# Patient Record
Sex: MALE | Race: WHITE | NOT HISPANIC OR LATINO | Employment: OTHER | URBAN - METROPOLITAN AREA
[De-identification: names, ages, dates, MRNs, and addresses within clinical notes are randomized per-mention and may not be internally consistent; named-entity substitution may affect disease eponyms.]

---

## 2017-09-29 ENCOUNTER — HISTORICAL (OUTPATIENT)
Dept: LAB | Facility: HOSPITAL | Age: 57
End: 2017-09-29

## 2017-09-29 LAB
ABS NEUT (OLG): 2.2 X10(3)/MCL (ref 2.1–9.2)
ALBUMIN SERPL-MCNC: 3.9 GM/DL (ref 3.4–5)
ALBUMIN/GLOB SERPL: 1.5 RATIO (ref 1.1–2)
ALP SERPL-CCNC: 58 UNIT/L (ref 46–116)
ALT SERPL-CCNC: 35 UNIT/L (ref 12–78)
AST SERPL-CCNC: 16 UNIT/L (ref 15–37)
BASOPHILS NFR BLD AUTO: 1 % (ref 0–2)
BILIRUB SERPL-MCNC: 5.7 MG/DL (ref 0.2–1)
BILIRUBIN DIRECT+TOT PNL SERPL-MCNC: 0.36 MG/DL (ref 0–0.2)
BILIRUBIN DIRECT+TOT PNL SERPL-MCNC: 5.34 MG/DL (ref 0–0.8)
BUN SERPL-MCNC: 13.1 MG/DL (ref 7–18)
CALCIUM SERPL-MCNC: 9.3 MG/DL (ref 8.5–10.1)
CHLORIDE SERPL-SCNC: 106 MMOL/L (ref 98–107)
CHOLEST SERPL-MCNC: 143 MG/DL (ref 0–200)
CHOLEST/HDLC SERPL: 2.8 {RATIO} (ref 0–5)
CO2 SERPL-SCNC: 26.6 MMOL/L (ref 21–32)
CREAT SERPL-MCNC: 1.01 MG/DL (ref 0.6–1.3)
EOSINOPHIL NFR BLD AUTO: 1 %
ERYTHROCYTE [DISTWIDTH] IN BLOOD BY AUTOMATED COUNT: 13 % (ref 11.5–17)
EST. AVERAGE GLUCOSE BLD GHB EST-MCNC: 166 MG/DL
FT4I SERPL CALC-MCNC: 6.76
GLOBULIN SER-MCNC: 2.6 GM/DL (ref 2.4–3.5)
GLUCOSE SERPL-MCNC: 163 MG/DL (ref 74–106)
HBA1C MFR BLD: 7.4 % (ref 4.5–6.2)
HCT VFR BLD AUTO: 46.3 % (ref 42–52)
HDLC SERPL-MCNC: 51 MG/DL (ref 40–60)
HGB BLD-MCNC: 15.7 GM/DL (ref 14–18)
LDLC SERPL CALC-MCNC: 72 MG/DL (ref 0–129)
LYMPHOCYTES # BLD AUTO: 1.2 X10(3)/MCL
LYMPHOCYTES NFR BLD AUTO: 32 % (ref 13–40)
MCH RBC QN AUTO: 28.3 PG (ref 27–31)
MCHC RBC AUTO-ENTMCNC: 34 GM/DL (ref 33–36)
MCV RBC AUTO: 83.3 FL (ref 80–94)
MONOCYTES # BLD AUTO: 0.4 X10(3)/MCL
MONOCYTES NFR BLD AUTO: 11 % (ref 2–11)
NEUTROPHILS # BLD AUTO: 2.2 X10(3)/MCL (ref 2.1–9.2)
NEUTROPHILS NFR BLD AUTO: 56 % (ref 47–80)
PLATELET # BLD AUTO: 168 X10(3)/MCL (ref 130–400)
PMV BLD AUTO: 8 FL (ref 7.4–10.4)
POTASSIUM SERPL-SCNC: 3.7 MMOL/L (ref 3.5–5.1)
PROT SERPL-MCNC: 6.5 GM/DL (ref 6.4–8.2)
PSA SERPL-MCNC: 1.1 NG/ML (ref 0–4)
RBC # BLD AUTO: 5.55 X10(6)/MCL (ref 4.7–6.1)
SODIUM SERPL-SCNC: 144 MMOL/L (ref 136–145)
T3RU NFR SERPL: 41 % (ref 31–39)
T4 SERPL-MCNC: 16.5 MCG/DL (ref 4.7–13.3)
TRIGL SERPL-MCNC: 98 MG/DL
TSH SERPL-ACNC: 0.01 MIU/ML (ref 0.36–3.74)
VLDLC SERPL CALC-MCNC: 20 MG/DL
WBC # SPEC AUTO: 3.9 X10(3)/MCL (ref 4.5–11.5)

## 2017-10-16 ENCOUNTER — HISTORICAL (OUTPATIENT)
Dept: LAB | Facility: HOSPITAL | Age: 57
End: 2017-10-16

## 2017-10-16 LAB
FT4I SERPL CALC-MCNC: 5.85
T3RU NFR SERPL: 39 % (ref 31–39)
T4 SERPL-MCNC: 15 MCG/DL (ref 4.7–13.3)
TSH SERPL-ACNC: 0.01 MIU/ML (ref 0.36–3.74)

## 2017-10-24 ENCOUNTER — HISTORICAL (OUTPATIENT)
Dept: RADIOLOGY | Facility: HOSPITAL | Age: 57
End: 2017-10-24

## 2018-04-23 ENCOUNTER — HISTORICAL (OUTPATIENT)
Dept: RESPIRATORY THERAPY | Facility: HOSPITAL | Age: 58
End: 2018-04-23

## 2018-04-23 LAB
ALBUMIN SERPL-MCNC: 3.9 GM/DL (ref 3.4–5)
ALP SERPL-CCNC: 66 UNIT/L (ref 50–136)
ALT SERPL-CCNC: 19 UNIT/L (ref 12–78)
AST SERPL-CCNC: 13 UNIT/L (ref 15–37)
BILIRUB SERPL-MCNC: 2.1 MG/DL (ref 0.2–1)
BILIRUBIN DIRECT+TOT PNL SERPL-MCNC: 0.4 MG/DL (ref 0–0.5)
BILIRUBIN DIRECT+TOT PNL SERPL-MCNC: 1.7 MG/DL (ref 0–0.8)
LIVER PROFILE INTERP: ABNORMAL
PROT SERPL-MCNC: 7 GM/DL (ref 6.4–8.2)
TSH SERPL-ACNC: 2.07 MIU/L (ref 0.36–3.74)

## 2018-09-21 ENCOUNTER — HISTORICAL (OUTPATIENT)
Dept: LAB | Facility: HOSPITAL | Age: 58
End: 2018-09-21

## 2018-09-21 LAB
BUN SERPL-MCNC: 8 MG/DL (ref 7–18)
CALCIUM SERPL-MCNC: 8.2 MG/DL (ref 8.5–10.1)
CHLORIDE SERPL-SCNC: 101 MMOL/L (ref 98–107)
CO2 SERPL-SCNC: 31.2 MMOL/L (ref 21–32)
CREAT SERPL-MCNC: 0.94 MG/DL (ref 0.6–1.3)
CREAT/UREA NIT SERPL: 9
EST. AVERAGE GLUCOSE BLD GHB EST-MCNC: 157 MG/DL
GLUCOSE SERPL-MCNC: 325 MG/DL (ref 74–106)
HBA1C MFR BLD: 7.1 % (ref 4.5–6.2)
POTASSIUM SERPL-SCNC: 3.7 MMOL/L (ref 3.5–5.1)
SODIUM SERPL-SCNC: 140 MMOL/L (ref 136–145)

## 2019-03-18 ENCOUNTER — HISTORICAL (OUTPATIENT)
Dept: ENDOSCOPY | Facility: HOSPITAL | Age: 59
End: 2019-03-18

## 2019-07-01 ENCOUNTER — HISTORICAL (OUTPATIENT)
Dept: ENDOSCOPY | Facility: HOSPITAL | Age: 59
End: 2019-07-01

## 2019-07-16 ENCOUNTER — HISTORICAL (OUTPATIENT)
Dept: LAB | Facility: HOSPITAL | Age: 59
End: 2019-07-16

## 2019-07-16 LAB
ABS NEUT (OLG): 1.89 X10(3)/MCL (ref 2.1–9.2)
ALBUMIN SERPL-MCNC: 3.2 GM/DL (ref 3.4–5)
ALBUMIN/GLOB SERPL: 1 RATIO (ref 1.1–2)
ALP SERPL-CCNC: 68 UNIT/L (ref 46–116)
ALT SERPL-CCNC: 16 UNIT/L (ref 12–78)
AST SERPL-CCNC: 4 UNIT/L (ref 15–37)
BASOPHILS # BLD AUTO: 0 X10(3)/MCL (ref 0–0.2)
BASOPHILS NFR BLD AUTO: 1 %
BILIRUB SERPL-MCNC: 0.8 MG/DL (ref 0.2–1)
BILIRUBIN DIRECT+TOT PNL SERPL-MCNC: 0.18 MG/DL (ref 0–0.2)
BILIRUBIN DIRECT+TOT PNL SERPL-MCNC: 0.62 MG/DL (ref 0–0.8)
BUN SERPL-MCNC: 15.3 MG/DL (ref 7–18)
CALCIUM SERPL-MCNC: 8.5 MG/DL (ref 8.5–10.1)
CHLORIDE SERPL-SCNC: 100 MMOL/L (ref 98–107)
CHOLEST SERPL-MCNC: 180 MG/DL (ref 0–200)
CHOLEST/HDLC SERPL: 4.3 {RATIO} (ref 0–5)
CO2 SERPL-SCNC: 30.5 MMOL/L (ref 21–32)
CREAT SERPL-MCNC: 1.05 MG/DL (ref 0.6–1.3)
EOSINOPHIL # BLD AUTO: 0.1 X10(3)/MCL (ref 0–0.9)
EOSINOPHIL NFR BLD AUTO: 3 %
ERYTHROCYTE [DISTWIDTH] IN BLOOD BY AUTOMATED COUNT: 12.5 % (ref 11.5–17)
EST. AVERAGE GLUCOSE BLD GHB EST-MCNC: 237 MG/DL
FT4I SERPL CALC-MCNC: 3.46
GLOBULIN SER-MCNC: 3.3 GM/DL (ref 2.4–3.5)
GLUCOSE SERPL-MCNC: 349 MG/DL (ref 74–106)
HBA1C MFR BLD: 9.9 % (ref 4.5–6.2)
HCT VFR BLD AUTO: 40.8 % (ref 42–52)
HDLC SERPL-MCNC: 42 MG/DL (ref 40–60)
HGB BLD-MCNC: 14.5 GM/DL (ref 14–18)
IMM GRANULOCYTES # BLD AUTO: 0.03 % (ref 0–0.02)
IMM GRANULOCYTES NFR BLD AUTO: 0.8 % (ref 0–0.43)
LDLC SERPL CALC-MCNC: 111 MG/DL (ref 0–129)
LYMPHOCYTES # BLD AUTO: 1.3 X10(3)/MCL (ref 0.6–4.6)
LYMPHOCYTES NFR BLD AUTO: 36 %
MCH RBC QN AUTO: 29.4 PG (ref 27–31)
MCHC RBC AUTO-ENTMCNC: 35.5 GM/DL (ref 33–36)
MCV RBC AUTO: 82.8 FL (ref 80–94)
MONOCYTES # BLD AUTO: 0.3 X10(3)/MCL (ref 0.1–1.3)
MONOCYTES NFR BLD AUTO: 9 %
NEUTROPHILS # BLD AUTO: 1.89 X10(3)/MCL (ref 1.4–7.9)
NEUTROPHILS NFR BLD AUTO: 50 %
PLATELET # BLD AUTO: 253 X10(3)/MCL (ref 130–400)
PMV BLD AUTO: 9 FL (ref 9.4–12.4)
POTASSIUM SERPL-SCNC: 4.5 MMOL/L (ref 3.5–5.1)
PROT SERPL-MCNC: 6.5 GM/DL (ref 6.4–8.2)
PSA SERPL-MCNC: 1.76 NG/ML (ref 0–4)
RBC # BLD AUTO: 4.93 X10(6)/MCL (ref 4.7–6.1)
SODIUM SERPL-SCNC: 138 MMOL/L (ref 136–145)
T3RU NFR SERPL: 36 % (ref 31–39)
T4 SERPL-MCNC: 9.6 MCG/DL (ref 4.7–13.3)
TRIGL SERPL-MCNC: 134 MG/DL
TSH SERPL-ACNC: 2.58 MIU/ML (ref 0.36–3.74)
VLDLC SERPL CALC-MCNC: 27 MG/DL
WBC # SPEC AUTO: 3.8 X10(3)/MCL (ref 4.5–11.5)

## 2019-08-05 ENCOUNTER — HISTORICAL (OUTPATIENT)
Dept: ENDOSCOPY | Facility: HOSPITAL | Age: 59
End: 2019-08-05

## 2019-09-09 ENCOUNTER — HISTORICAL (OUTPATIENT)
Dept: ENDOSCOPY | Facility: HOSPITAL | Age: 59
End: 2019-09-09

## 2020-01-07 ENCOUNTER — HISTORICAL (OUTPATIENT)
Dept: LAB | Facility: HOSPITAL | Age: 60
End: 2020-01-07

## 2020-01-07 LAB
BUN SERPL-MCNC: 12 MG/DL (ref 7–18)
CALCIUM SERPL-MCNC: 8.8 MG/DL (ref 8.5–10.1)
CHLORIDE SERPL-SCNC: 104 MMOL/L (ref 98–107)
CO2 SERPL-SCNC: 30.2 MMOL/L (ref 21–32)
CREAT SERPL-MCNC: 0.88 MG/DL (ref 0.6–1.3)
CREAT/UREA NIT SERPL: 14
EST. AVERAGE GLUCOSE BLD GHB EST-MCNC: 240 MG/DL
GLUCOSE SERPL-MCNC: 101 MG/DL (ref 74–106)
HBA1C MFR BLD: 10 % (ref 4.5–6.2)
POTASSIUM SERPL-SCNC: 3.6 MMOL/L (ref 3.5–5.1)
SODIUM SERPL-SCNC: 143 MMOL/L (ref 136–145)

## 2020-06-23 ENCOUNTER — HISTORICAL (OUTPATIENT)
Dept: ADMINISTRATIVE | Facility: HOSPITAL | Age: 60
End: 2020-06-23

## 2020-06-23 LAB
ABS NEUT (OLG): 7.15 X10(3)/MCL (ref 2.1–9.2)
BASOPHILS # BLD AUTO: 0 X10(3)/MCL (ref 0–0.2)
BASOPHILS NFR BLD AUTO: 0 %
BUN SERPL-MCNC: 7.5 MG/DL (ref 8.4–25.7)
BUN SERPL-MCNC: 8.4 MG/DL (ref 8.4–25.7)
CALCIUM SERPL-MCNC: 7.6 MG/DL (ref 8.8–10)
CALCIUM SERPL-MCNC: 8.6 MG/DL (ref 8.8–10)
CHLORIDE SERPL-SCNC: 105 MMOL/L (ref 98–107)
CHLORIDE SERPL-SCNC: 105 MMOL/L (ref 98–107)
CO2 SERPL-SCNC: 21 MMOL/L (ref 23–31)
CO2 SERPL-SCNC: 27 MMOL/L (ref 23–31)
CREAT SERPL-MCNC: 0.78 MG/DL (ref 0.73–1.18)
CREAT SERPL-MCNC: 0.82 MG/DL (ref 0.73–1.18)
CREAT/UREA NIT SERPL: 11
CREAT/UREA NIT SERPL: 9
EOSINOPHIL # BLD AUTO: 0 X10(3)/MCL (ref 0–0.9)
EOSINOPHIL NFR BLD AUTO: 0 %
ERYTHROCYTE [DISTWIDTH] IN BLOOD BY AUTOMATED COUNT: 12.9 % (ref 11.5–17)
GLUCOSE SERPL-MCNC: 219 MG/DL (ref 82–115)
GLUCOSE SERPL-MCNC: 239 MG/DL (ref 82–115)
HCT VFR BLD AUTO: 40.5 % (ref 42–52)
HGB BLD-MCNC: 13.8 GM/DL (ref 14–18)
LYMPHOCYTES # BLD AUTO: 0.9 X10(3)/MCL (ref 0.6–4.6)
LYMPHOCYTES NFR BLD AUTO: 11 %
MCH RBC QN AUTO: 29.2 PG (ref 27–31)
MCHC RBC AUTO-ENTMCNC: 34.1 GM/DL (ref 33–36)
MCV RBC AUTO: 85.8 FL (ref 80–94)
MONOCYTES # BLD AUTO: 0.5 X10(3)/MCL (ref 0.1–1.3)
MONOCYTES NFR BLD AUTO: 6 %
NEUTROPHILS # BLD AUTO: 7.15 X10(3)/MCL (ref 2.1–9.2)
NEUTROPHILS NFR BLD AUTO: 83 %
PLATELET # BLD AUTO: 153 X10(3)/MCL (ref 130–400)
PMV BLD AUTO: 9.8 FL (ref 9.4–12.4)
POTASSIUM SERPL-SCNC: 3.7 MMOL/L (ref 3.5–5.1)
POTASSIUM SERPL-SCNC: 4 MMOL/L (ref 3.5–5.1)
RBC # BLD AUTO: 4.72 X10(6)/MCL (ref 4.7–6.1)
SODIUM SERPL-SCNC: 139 MMOL/L (ref 136–145)
SODIUM SERPL-SCNC: 141 MMOL/L (ref 136–145)
WBC # SPEC AUTO: 8.7 X10(3)/MCL (ref 4.5–11.5)

## 2020-06-24 LAB
ABS NEUT (OLG): 5.88 X10(3)/MCL (ref 2.1–9.2)
ALBUMIN SERPL-MCNC: 3.1 GM/DL (ref 3.4–4.8)
ALBUMIN/GLOB SERPL: 1.5 RATIO (ref 1.1–2)
ALP SERPL-CCNC: 44 UNIT/L (ref 40–150)
ALT SERPL-CCNC: 7 UNIT/L (ref 0–55)
AST SERPL-CCNC: 26 UNIT/L (ref 5–34)
BASOPHILS # BLD AUTO: 0 X10(3)/MCL (ref 0–0.2)
BASOPHILS NFR BLD AUTO: 0 %
BILIRUB SERPL-MCNC: 3.8 MG/DL
BILIRUBIN DIRECT+TOT PNL SERPL-MCNC: 0.6 MG/DL (ref 0–0.5)
BILIRUBIN DIRECT+TOT PNL SERPL-MCNC: 3.2 MG/DL (ref 0–0.8)
BUN SERPL-MCNC: 10.6 MG/DL (ref 8.4–25.7)
CALCIUM SERPL-MCNC: 7.2 MG/DL (ref 8.8–10)
CHLORIDE SERPL-SCNC: 106 MMOL/L (ref 98–107)
CO2 SERPL-SCNC: 22 MMOL/L (ref 23–31)
CREAT SERPL-MCNC: 0.82 MG/DL (ref 0.73–1.18)
ERYTHROCYTE [DISTWIDTH] IN BLOOD BY AUTOMATED COUNT: 13.2 % (ref 11.5–17)
GLOBULIN SER-MCNC: 2.1 GM/DL (ref 2.4–3.5)
GLUCOSE SERPL-MCNC: 257 MG/DL (ref 82–115)
HCT VFR BLD AUTO: 38.1 % (ref 42–52)
HGB BLD-MCNC: 13.1 GM/DL (ref 14–18)
LYMPHOCYTES # BLD AUTO: 0.8 X10(3)/MCL (ref 0.6–4.6)
LYMPHOCYTES NFR BLD AUTO: 11 %
MCH RBC QN AUTO: 29.7 PG (ref 27–31)
MCHC RBC AUTO-ENTMCNC: 34.4 GM/DL (ref 33–36)
MCV RBC AUTO: 86.4 FL (ref 80–94)
MONOCYTES # BLD AUTO: 0.4 X10(3)/MCL (ref 0.1–1.3)
MONOCYTES NFR BLD AUTO: 6 %
NEUTROPHILS # BLD AUTO: 5.88 X10(3)/MCL (ref 2.1–9.2)
NEUTROPHILS NFR BLD AUTO: 83 %
PLATELET # BLD AUTO: 152 X10(3)/MCL (ref 130–400)
PMV BLD AUTO: 10.1 FL (ref 9.4–12.4)
POTASSIUM SERPL-SCNC: 3.8 MMOL/L (ref 3.5–5.1)
PROT SERPL-MCNC: 5.2 GM/DL (ref 5.8–7.6)
RBC # BLD AUTO: 4.41 X10(6)/MCL (ref 4.7–6.1)
SODIUM SERPL-SCNC: 137 MMOL/L (ref 136–145)
WBC # SPEC AUTO: 7.1 X10(3)/MCL (ref 4.5–11.5)

## 2020-08-10 ENCOUNTER — HISTORICAL (OUTPATIENT)
Dept: LAB | Facility: HOSPITAL | Age: 60
End: 2020-08-10

## 2020-08-10 LAB
ALBUMIN SERPL-MCNC: 3.7 GM/DL (ref 3.4–5)
ALP SERPL-CCNC: 55 UNIT/L (ref 46–116)
ALT SERPL-CCNC: 13 UNIT/L (ref 12–78)
AST SERPL-CCNC: 11 UNIT/L (ref 15–37)
BILIRUB SERPL-MCNC: 2.3 MG/DL (ref 0.2–1)
BILIRUBIN DIRECT+TOT PNL SERPL-MCNC: 0.41 MG/DL (ref 0–0.2)
BILIRUBIN DIRECT+TOT PNL SERPL-MCNC: 1.89 MG/DL (ref 0–0.8)
BUN SERPL-MCNC: 16.1 MG/DL (ref 7–18)
CALCIUM SERPL-MCNC: 9 MG/DL (ref 8.5–10.1)
CHLORIDE SERPL-SCNC: 105 MMOL/L (ref 98–107)
CHOLEST SERPL-MCNC: 213 MG/DL (ref 0–200)
CHOLEST/HDLC SERPL: 3 {RATIO} (ref 0–5)
CO2 SERPL-SCNC: 28.4 MMOL/L (ref 21–32)
CREAT SERPL-MCNC: 0.73 MG/DL (ref 0.6–1.3)
CREAT/UREA NIT SERPL: 22 MG/DL (ref 12–14)
ERYTHROCYTE [DISTWIDTH] IN BLOOD BY AUTOMATED COUNT: 12.5 % (ref 11.5–17)
EST. AVERAGE GLUCOSE BLD GHB EST-MCNC: 200 MG/DL
FT4I SERPL CALC-MCNC: 2.94
GLUCOSE SERPL-MCNC: 159 MG/DL (ref 74–106)
HBA1C MFR BLD: 8.6 % (ref 4.5–6.2)
HCT VFR BLD AUTO: 42.5 % (ref 42–52)
HDLC SERPL-MCNC: 70 MG/DL (ref 40–60)
HGB BLD-MCNC: 14.9 GM/DL (ref 14–18)
LDLC SERPL CALC-MCNC: 129 MG/DL (ref 0–129)
MCH RBC QN AUTO: 29.2 PG (ref 27–31)
MCHC RBC AUTO-ENTMCNC: 35.1 GM/DL (ref 33–36)
MCV RBC AUTO: 83.2 FL (ref 80–94)
PLATELET # BLD AUTO: 164 X10(3)/MCL (ref 130–400)
PMV BLD AUTO: 9.3 FL (ref 9.4–12.4)
POTASSIUM SERPL-SCNC: 3.9 MMOL/L (ref 3.5–5.1)
PROT SERPL-MCNC: 6.6 GM/DL (ref 6.4–8.2)
PSA SERPL-MCNC: 3.36 NG/ML (ref 0–4)
RBC # BLD AUTO: 5.11 X10(6)/MCL (ref 4.7–6.1)
SODIUM SERPL-SCNC: 143 MMOL/L (ref 136–145)
T3RU NFR SERPL: 31 % (ref 31–39)
T4 SERPL-MCNC: 9.5 MCG/DL (ref 4.7–13.3)
TRIGL SERPL-MCNC: 69 MG/DL
TSH SERPL-ACNC: 1.96 MIU/ML (ref 0.36–3.74)
VLDLC SERPL CALC-MCNC: 14 MG/DL
WBC # SPEC AUTO: 3.5 X10(3)/MCL (ref 4.5–11.5)

## 2020-09-11 ENCOUNTER — HISTORICAL (OUTPATIENT)
Dept: ENDOSCOPY | Facility: HOSPITAL | Age: 60
End: 2020-09-11

## 2020-10-12 ENCOUNTER — HISTORICAL (OUTPATIENT)
Dept: LAB | Facility: HOSPITAL | Age: 60
End: 2020-10-12

## 2020-10-12 LAB
ALBUMIN SERPL-MCNC: 3.9 GM/DL (ref 3.4–5)
ALBUMIN/GLOB SERPL: 1.3 RATIO (ref 1.1–2)
ALP SERPL-CCNC: 66 UNIT/L (ref 46–116)
ALT SERPL-CCNC: 14 UNIT/L (ref 12–78)
AST SERPL-CCNC: 13 UNIT/L (ref 15–37)
BILIRUB SERPL-MCNC: 3 MG/DL (ref 0.2–1)
BILIRUBIN DIRECT+TOT PNL SERPL-MCNC: 0.46 MG/DL (ref 0–0.2)
BILIRUBIN DIRECT+TOT PNL SERPL-MCNC: 2.54 MG/DL (ref 0–0.8)
BUN SERPL-MCNC: 7.1 MG/DL (ref 7–18)
CALCIUM SERPL-MCNC: 8.7 MG/DL (ref 8.5–10.1)
CHLORIDE SERPL-SCNC: 104 MMOL/L (ref 98–107)
CO2 SERPL-SCNC: 27.4 MMOL/L (ref 21–32)
CREAT SERPL-MCNC: 0.91 MG/DL (ref 0.6–1.3)
ERYTHROCYTE [DISTWIDTH] IN BLOOD BY AUTOMATED COUNT: 12.7 % (ref 11.5–17)
GGT SERPL-CCNC: 10 U/L (ref 12–64)
GLOBULIN SER-MCNC: 2.9 GM/DL (ref 2.4–3.5)
GLUCOSE SERPL-MCNC: 243 MG/DL (ref 74–106)
HCT VFR BLD AUTO: 45.6 % (ref 42–52)
HGB BLD-MCNC: 15.7 GM/DL (ref 14–18)
LDH SERPL-CCNC: 164 UNIT/L (ref 81–234)
MCH RBC QN AUTO: 29.2 PG (ref 27–31)
MCHC RBC AUTO-ENTMCNC: 34.4 GM/DL (ref 33–36)
MCV RBC AUTO: 84.9 FL (ref 80–94)
PLATELET # BLD AUTO: 182 X10(3)/MCL (ref 130–400)
PMV BLD AUTO: 9.5 FL (ref 9.4–12.4)
POTASSIUM SERPL-SCNC: 3.6 MMOL/L (ref 3.5–5.1)
PROT SERPL-MCNC: 6.8 GM/DL (ref 6.4–8.2)
RBC # BLD AUTO: 5.37 X10(6)/MCL (ref 4.7–6.1)
SODIUM SERPL-SCNC: 143 MMOL/L (ref 136–145)
WBC # SPEC AUTO: 3.2 X10(3)/MCL (ref 4.5–11.5)

## 2021-02-01 ENCOUNTER — HISTORICAL (OUTPATIENT)
Dept: LAB | Facility: HOSPITAL | Age: 61
End: 2021-02-01

## 2021-02-01 LAB
ALBUMIN SERPL-MCNC: 3.7 GM/DL (ref 3.4–4.8)
ALP SERPL-CCNC: 54 UNIT/L (ref 40–150)
ALT SERPL-CCNC: 6 UNIT/L (ref 0–55)
AST SERPL-CCNC: 11 UNIT/L (ref 5–34)
BILIRUB SERPL-MCNC: 3 MG/DL
BILIRUBIN DIRECT+TOT PNL SERPL-MCNC: 0.7 MG/DL (ref 0–0.5)
BILIRUBIN DIRECT+TOT PNL SERPL-MCNC: 2.3 MG/DL (ref 0–0.8)
BUN SERPL-MCNC: 9.8 MG/DL (ref 8.4–25.7)
CALCIUM SERPL-MCNC: 8.5 MG/DL (ref 8.8–10)
CHLORIDE SERPL-SCNC: 102 MMOL/L (ref 98–107)
CO2 SERPL-SCNC: 28 MMOL/L (ref 23–31)
CREAT SERPL-MCNC: 0.84 MG/DL (ref 0.73–1.18)
CREAT/UREA NIT SERPL: 12
EST. AVERAGE GLUCOSE BLD GHB EST-MCNC: 246 MG/DL
GLUCOSE SERPL-MCNC: 249 MG/DL (ref 82–115)
HBA1C MFR BLD: 10.2 %
POTASSIUM SERPL-SCNC: 3.5 MMOL/L (ref 3.5–5.1)
PROT SERPL-MCNC: 6.5 GM/DL (ref 5.8–7.6)
PSA SERPL-MCNC: 1.18 NG/ML
SODIUM SERPL-SCNC: 138 MMOL/L (ref 136–145)

## 2021-03-10 ENCOUNTER — HISTORICAL (OUTPATIENT)
Dept: ENDOSCOPY | Facility: HOSPITAL | Age: 61
End: 2021-03-10

## 2021-04-15 ENCOUNTER — HISTORICAL (OUTPATIENT)
Dept: LAB | Facility: HOSPITAL | Age: 61
End: 2021-04-15

## 2021-04-15 LAB
BUN SERPL-MCNC: 11.6 MG/DL (ref 8.4–25.7)
CALCIUM SERPL-MCNC: 8.6 MG/DL (ref 8.8–10)
CHLORIDE SERPL-SCNC: 103 MMOL/L (ref 98–107)
CO2 SERPL-SCNC: 31 MMOL/L (ref 23–31)
CREAT SERPL-MCNC: 0.86 MG/DL (ref 0.73–1.18)
CREAT/UREA NIT SERPL: 13
ERYTHROCYTE [DISTWIDTH] IN BLOOD BY AUTOMATED COUNT: 13.3 % (ref 11.5–17)
GLUCOSE SERPL-MCNC: 72 MG/DL (ref 82–115)
HCT VFR BLD AUTO: 45.9 % (ref 42–52)
HGB BLD-MCNC: 15.2 GM/DL (ref 14–18)
INR PPP: 1 (ref 2–3)
MAGNESIUM SERPL-MCNC: 1.5 MG/DL (ref 1.6–2.6)
MCH RBC QN AUTO: 28.6 PG (ref 27–31)
MCHC RBC AUTO-ENTMCNC: 33.1 GM/DL (ref 33–36)
MCV RBC AUTO: 86.3 FL (ref 80–94)
PLATELET # BLD AUTO: 178 X10(3)/MCL (ref 130–400)
PMV BLD AUTO: 9.5 FL (ref 9.4–12.4)
POTASSIUM SERPL-SCNC: 3.4 MMOL/L (ref 3.5–5.1)
PROTHROMBIN TIME: 10.5 SECOND(S) (ref 9.3–11.4)
RBC # BLD AUTO: 5.32 X10(6)/MCL (ref 4.7–6.1)
SODIUM SERPL-SCNC: 144 MMOL/L (ref 136–145)
WBC # SPEC AUTO: 4.3 X10(3)/MCL (ref 4.5–11.5)

## 2021-04-19 ENCOUNTER — HISTORICAL (OUTPATIENT)
Dept: ADMINISTRATIVE | Facility: HOSPITAL | Age: 61
End: 2021-04-19

## 2021-04-19 LAB
ALBUMIN SERPL-MCNC: 3.2 GM/DL (ref 3.4–4.8)
ALBUMIN/GLOB SERPL: 1.4 RATIO (ref 1.1–2)
ALP SERPL-CCNC: 61 UNIT/L (ref 40–150)
ALT SERPL-CCNC: 34 UNIT/L (ref 0–55)
AST SERPL-CCNC: 21 UNIT/L (ref 5–34)
BILIRUB SERPL-MCNC: 2.9 MG/DL
BILIRUBIN DIRECT+TOT PNL SERPL-MCNC: 0.8 MG/DL (ref 0–0.5)
BILIRUBIN DIRECT+TOT PNL SERPL-MCNC: 2.1 MG/DL (ref 0–0.8)
BUN SERPL-MCNC: 10.1 MG/DL (ref 8.4–25.7)
CALCIUM SERPL-MCNC: 8.4 MG/DL (ref 8.8–10)
CHLORIDE SERPL-SCNC: 106 MMOL/L (ref 98–107)
CO2 SERPL-SCNC: 27 MMOL/L (ref 23–31)
CREAT SERPL-MCNC: 0.86 MG/DL (ref 0.73–1.18)
GLOBULIN SER-MCNC: 2.3 GM/DL (ref 2.4–3.5)
GLUCOSE SERPL-MCNC: 165 MG/DL (ref 82–115)
MAGNESIUM SERPL-MCNC: 1.8 MG/DL (ref 1.6–2.6)
POTASSIUM SERPL-SCNC: 3.4 MMOL/L (ref 3.5–5.1)
PROT SERPL-MCNC: 5.5 GM/DL (ref 5.8–7.6)
SODIUM SERPL-SCNC: 140 MMOL/L (ref 136–145)

## 2021-05-12 ENCOUNTER — HISTORICAL (OUTPATIENT)
Dept: ENDOSCOPY | Facility: HOSPITAL | Age: 61
End: 2021-05-12

## 2021-09-07 ENCOUNTER — HISTORICAL (OUTPATIENT)
Dept: LAB | Facility: HOSPITAL | Age: 61
End: 2021-09-07

## 2021-09-07 LAB
ALBUMIN SERPL-MCNC: 3.5 GM/DL (ref 3.4–4.8)
ALP SERPL-CCNC: 51 UNIT/L (ref 40–150)
ALT SERPL-CCNC: 24 UNIT/L (ref 0–55)
APPEARANCE, UA: CLEAR
AST SERPL-CCNC: 30 UNIT/L (ref 5–34)
BACTERIA #/AREA URNS AUTO: ABNORMAL /HPF
BILIRUB SERPL-MCNC: 5.8 MG/DL
BILIRUB UR QL STRIP: ABNORMAL
BILIRUBIN DIRECT+TOT PNL SERPL-MCNC: 1 MG/DL (ref 0–0.5)
BILIRUBIN DIRECT+TOT PNL SERPL-MCNC: 4.8 MG/DL (ref 0–0.8)
BUN SERPL-MCNC: 11 MG/DL (ref 8.4–25.7)
CALCIUM SERPL-MCNC: 8.6 MG/DL (ref 8.8–10)
CHLORIDE SERPL-SCNC: 105 MMOL/L (ref 98–107)
CHOLEST SERPL-MCNC: 173 MG/DL
CHOLEST/HDLC SERPL: 3 {RATIO} (ref 0–5)
CO2 SERPL-SCNC: 27 MMOL/L (ref 23–31)
COLOR UR: YELLOW
CREAT SERPL-MCNC: 0.92 MG/DL (ref 0.73–1.18)
CREAT UR-MCNC: 237.7 MG/DL (ref 58–161)
CREAT/UREA NIT SERPL: 12
ERYTHROCYTE [DISTWIDTH] IN BLOOD BY AUTOMATED COUNT: 14 % (ref 11.5–17)
EST. AVERAGE GLUCOSE BLD GHB EST-MCNC: 205.9 MG/DL
FT4I SERPL CALC-MCNC: 3.08 (ref 2.6–3.6)
GLUCOSE (UA): 100
GLUCOSE SERPL-MCNC: 139 MG/DL (ref 82–115)
HBA1C MFR BLD: 8.8 %
HCT VFR BLD AUTO: 47.4 % (ref 42–52)
HDLC SERPL-MCNC: 57 MG/DL (ref 35–60)
HGB BLD-MCNC: 16.2 GM/DL (ref 14–18)
HGB UR QL STRIP: ABNORMAL
KETONES UR QL STRIP: ABNORMAL
LDH SERPL-CCNC: 320 UNIT/L (ref 140–271)
LDLC SERPL CALC-MCNC: 94 MG/DL (ref 50–140)
LEUKOCYTE ESTERASE UR QL STRIP: NEGATIVE
MCH RBC QN AUTO: 29.1 PG (ref 27–31)
MCHC RBC AUTO-ENTMCNC: 34.2 GM/DL (ref 33–36)
MCV RBC AUTO: 85.3 FL (ref 80–94)
MICROALBUMIN UR-MCNC: 448.9 UG/ML
MICROALBUMIN/CREAT RATIO PNL UR: 188.9 MG/GM CR (ref 0–30)
MUCOUS THREADS URNS QL MICRO: SLIGHT
NITRITE UR QL STRIP.AUTO: NEGATIVE
PH UR STRIP: 6 [PH] (ref 5–9)
PLATELET # BLD AUTO: 132 X10(3)/MCL (ref 130–400)
PMV BLD AUTO: 10.9 FL (ref 9.4–12.4)
POTASSIUM SERPL-SCNC: 3.8 MMOL/L (ref 3.5–5.1)
PROT SERPL-MCNC: 6 GM/DL (ref 5.8–7.6)
PROT UR QL STRIP: 100
RBC # BLD AUTO: 5.56 X10(6)/MCL (ref 4.7–6.1)
RBC #/AREA URNS HPF: ABNORMAL /HPF
SODIUM SERPL-SCNC: 143 MMOL/L (ref 136–145)
SP GR UR STRIP: 1.02 (ref 1–1.03)
SQUAMOUS EPITHELIAL, UA: ABNORMAL
T3RU NFR SERPL: 35.8 % (ref 31–39)
T4 SERPL-MCNC: 8.59 UG/DL (ref 4.87–11.72)
TRIGL SERPL-MCNC: 110 MG/DL (ref 34–140)
TSH SERPL-ACNC: 3.1 UIU/ML (ref 0.35–4.94)
UROBILINOGEN UR STRIP-ACNC: >=8
VLDLC SERPL CALC-MCNC: 22 MG/DL
WBC # SPEC AUTO: 4.2 X10(3)/MCL (ref 4.5–11.5)
WBC #/AREA URNS AUTO: ABNORMAL /[HPF]

## 2021-11-09 ENCOUNTER — HISTORICAL (OUTPATIENT)
Dept: LAB | Facility: HOSPITAL | Age: 61
End: 2021-11-09

## 2021-11-09 LAB
ABS NEUT (OLG): 1.82 X10(3)/MCL (ref 2.1–9.2)
BASOPHILS # BLD AUTO: 0 X10(3)/MCL (ref 0–0.2)
BASOPHILS NFR BLD AUTO: 1 %
BUN SERPL-MCNC: 8.8 MG/DL (ref 8.4–25.7)
CALCIUM SERPL-MCNC: 8.8 MG/DL (ref 8.7–10.5)
CHLORIDE SERPL-SCNC: 101 MMOL/L (ref 98–107)
CO2 SERPL-SCNC: 30 MMOL/L (ref 23–31)
CREAT SERPL-MCNC: 0.94 MG/DL (ref 0.73–1.18)
CREAT/UREA NIT SERPL: 9
EOSINOPHIL # BLD AUTO: 0.1 X10(3)/MCL (ref 0–0.9)
EOSINOPHIL NFR BLD AUTO: 2 %
ERYTHROCYTE [DISTWIDTH] IN BLOOD BY AUTOMATED COUNT: 14 % (ref 11.5–17)
GLUCOSE SERPL-MCNC: 124 MG/DL (ref 82–115)
HCT VFR BLD AUTO: 44.5 % (ref 42–52)
HGB BLD-MCNC: 14.7 GM/DL (ref 14–18)
IMM GRANULOCYTES # BLD AUTO: 0.01 % (ref 0–0.02)
IMM GRANULOCYTES NFR BLD AUTO: 0.3 % (ref 0–0.43)
INR PPP: 1.1 (ref 2–3)
LYMPHOCYTES # BLD AUTO: 1.2 X10(3)/MCL (ref 0.6–4.6)
LYMPHOCYTES NFR BLD AUTO: 34 %
MCH RBC QN AUTO: 29.4 PG (ref 27–31)
MCHC RBC AUTO-ENTMCNC: 33 GM/DL (ref 33–36)
MCV RBC AUTO: 89 FL (ref 80–94)
MONOCYTES # BLD AUTO: 0.3 X10(3)/MCL (ref 0.1–1.3)
MONOCYTES NFR BLD AUTO: 8 %
NEUTROPHILS # BLD AUTO: 1.82 X10(3)/MCL (ref 1.4–7.9)
NEUTROPHILS NFR BLD AUTO: 55 %
PLATELET # BLD AUTO: 138 X10(3)/MCL (ref 130–400)
PMV BLD AUTO: 10.1 FL (ref 9.4–12.4)
POTASSIUM SERPL-SCNC: 3.4 MMOL/L (ref 3.5–5.1)
PROTHROMBIN TIME: 11.6 SECOND(S) (ref 9.3–11.4)
RBC # BLD AUTO: 5 X10(6)/MCL (ref 4.7–6.1)
SODIUM SERPL-SCNC: 141 MMOL/L (ref 136–145)
WBC # SPEC AUTO: 3.3 X10(3)/MCL (ref 4.5–11.5)

## 2021-11-17 ENCOUNTER — HISTORICAL (OUTPATIENT)
Dept: LAB | Facility: HOSPITAL | Age: 61
End: 2021-11-17

## 2021-11-17 LAB — SARS-COV-2 RNA RESP QL NAA+PROBE: NEGATIVE

## 2021-11-19 ENCOUNTER — HOSPITAL ENCOUNTER (OUTPATIENT)
Dept: MEDSURG UNIT | Facility: HOSPITAL | Age: 61
End: 2021-11-20
Attending: INTERNAL MEDICINE | Admitting: INTERNAL MEDICINE

## 2021-11-19 LAB — POTASSIUM SERPL-SCNC: 3.5 MMOL/L (ref 3.5–5.1)

## 2021-11-20 LAB
ABS NEUT (OLG): 2.15 X10(3)/MCL (ref 2.1–9.2)
ALBUMIN SERPL-MCNC: 3.1 GM/DL (ref 3.4–4.8)
ALBUMIN/GLOB SERPL: 1.7 RATIO (ref 1.1–2)
ALP SERPL-CCNC: 53 UNIT/L (ref 40–150)
ALT SERPL-CCNC: 9 UNIT/L (ref 0–55)
AST SERPL-CCNC: 15 UNIT/L (ref 5–34)
BASOPHILS # BLD AUTO: 0 X10(3)/MCL (ref 0–0.2)
BASOPHILS NFR BLD AUTO: 1 %
BILIRUB SERPL-MCNC: 3.8 MG/DL
BILIRUBIN DIRECT+TOT PNL SERPL-MCNC: 1 MG/DL (ref 0–0.5)
BILIRUBIN DIRECT+TOT PNL SERPL-MCNC: 2.8 MG/DL (ref 0–0.8)
BUN SERPL-MCNC: 9.5 MG/DL (ref 8.4–25.7)
CALCIUM SERPL-MCNC: 8.2 MG/DL (ref 8.7–10.5)
CHLORIDE SERPL-SCNC: 106 MMOL/L (ref 98–107)
CO2 SERPL-SCNC: 26 MMOL/L (ref 23–31)
CREAT SERPL-MCNC: 0.97 MG/DL (ref 0.73–1.18)
EOSINOPHIL # BLD AUTO: 0 X10(3)/MCL (ref 0–0.9)
EOSINOPHIL NFR BLD AUTO: 1 %
ERYTHROCYTE [DISTWIDTH] IN BLOOD BY AUTOMATED COUNT: 14.6 % (ref 11.5–17)
GLOBULIN SER-MCNC: 1.8 GM/DL (ref 2.4–3.5)
GLUCOSE SERPL-MCNC: 271 MG/DL (ref 82–115)
HCT VFR BLD AUTO: 38.4 % (ref 42–52)
HGB BLD-MCNC: 12.9 GM/DL (ref 14–18)
LYMPHOCYTES # BLD AUTO: 1.2 X10(3)/MCL (ref 0.6–4.6)
LYMPHOCYTES NFR BLD AUTO: 32 %
MCH RBC QN AUTO: 29.3 PG (ref 27–31)
MCHC RBC AUTO-ENTMCNC: 33.6 GM/DL (ref 33–36)
MCV RBC AUTO: 87.1 FL (ref 80–94)
MONOCYTES # BLD AUTO: 0.4 X10(3)/MCL (ref 0.1–1.3)
MONOCYTES NFR BLD AUTO: 9 %
NEUTROPHILS # BLD AUTO: 2.15 X10(3)/MCL (ref 2.1–9.2)
NEUTROPHILS NFR BLD AUTO: 56 %
PLATELET # BLD AUTO: 128 X10(3)/MCL (ref 130–400)
PMV BLD AUTO: 10.1 FL (ref 9.4–12.4)
POTASSIUM SERPL-SCNC: 3.7 MMOL/L (ref 3.5–5.1)
PROT SERPL-MCNC: 4.9 GM/DL (ref 5.8–7.6)
RBC # BLD AUTO: 4.41 X10(6)/MCL (ref 4.7–6.1)
SODIUM SERPL-SCNC: 141 MMOL/L (ref 136–145)
WBC # SPEC AUTO: 3.8 X10(3)/MCL (ref 4.5–11.5)

## 2022-03-14 ENCOUNTER — HISTORICAL (OUTPATIENT)
Dept: LAB | Facility: HOSPITAL | Age: 62
End: 2022-03-14

## 2022-03-14 LAB
ALBUMIN SERPL-MCNC: 3.2 G/DL (ref 3.4–4.8)
ALBUMIN/GLOB SERPL: 1.3 {RATIO} (ref 1.1–2)
ALP SERPL-CCNC: 57 U/L (ref 40–150)
ALT SERPL-CCNC: 18 U/L (ref 0–55)
AST SERPL-CCNC: 21 U/L (ref 5–34)
BILIRUB SERPL-MCNC: 4.1 MG/DL
BILIRUBIN DIRECT+TOT PNL SERPL-MCNC: 1.1 (ref 0–0.5)
BILIRUBIN DIRECT+TOT PNL SERPL-MCNC: 3 (ref 0–0.8)
BUN SERPL-MCNC: 18.6 MG/DL (ref 8.4–25.7)
CALCIUM SERPL-MCNC: 8.9 MG/DL (ref 8.7–10.5)
CHLORIDE SERPL-SCNC: 101 MMOL/L (ref 98–107)
CHOLEST SERPL-MCNC: 156 MG/DL
CHOLEST/HDLC SERPL: 3 {RATIO} (ref 0–5)
CO2 SERPL-SCNC: 31 MMOL/L (ref 23–31)
CREAT SERPL-MCNC: 1.13 MG/DL (ref 0.73–1.18)
CREAT UR-MCNC: 165.8 MG/DL (ref 58–161)
EST. AVERAGE GLUCOSE BLD GHB EST-MCNC: 271.9 MG/DL
GLOBULIN SER-MCNC: 2.5 G/DL (ref 2.4–3.5)
GLUCOSE SERPL-MCNC: 230 MG/DL (ref 82–115)
HBA1C MFR BLD: 11.1 %
HDLC SERPL-MCNC: 49 MG/DL (ref 35–60)
HEMOLYSIS INTERF INDEX SERPL-ACNC: 3
ICTERIC INTERF INDEX SERPL-ACNC: 4
LDLC SERPL CALC-MCNC: 91 MG/DL (ref 50–140)
LIPEMIC INTERF INDEX SERPL-ACNC: <0
MICROALBUMIN UR-MCNC: 203.7
MICROALBUMIN/CREAT RATIO PNL UR: 122.9 (ref 0–30)
POTASSIUM SERPL-SCNC: 4.2 MMOL/L (ref 3.5–5.1)
PROT SERPL-MCNC: 5.7 G/DL (ref 5.8–7.6)
PROT UR STRIP-MCNC: 47.9 MG/DL
PSA SERPL-MCNC: 0.55 NG/ML
SODIUM SERPL-SCNC: 142 MMOL/L (ref 136–145)
TRIGL SERPL-MCNC: 79 MG/DL (ref 34–140)
VLDLC SERPL CALC-MCNC: 16 MG/DL

## 2022-03-19 ENCOUNTER — HISTORICAL (OUTPATIENT)
Dept: LAB | Facility: HOSPITAL | Age: 62
End: 2022-03-19

## 2022-03-19 LAB
ALBUMIN SERPL-MCNC: 3.1 G/DL (ref 3.4–4.8)
ALBUMIN/GLOB SERPL: 1.2 {RATIO} (ref 1.1–2)
ALP SERPL-CCNC: 54 U/L (ref 40–150)
ALT SERPL-CCNC: 20 U/L (ref 0–55)
AST SERPL-CCNC: 21 U/L (ref 5–34)
BILIRUB SERPL-MCNC: 4.5 MG/DL
BILIRUBIN DIRECT+TOT PNL SERPL-MCNC: 1.2 (ref 0–0.5)
BILIRUBIN DIRECT+TOT PNL SERPL-MCNC: 3.3 (ref 0–0.8)
BUN SERPL-MCNC: 17.6 MG/DL (ref 8.4–25.7)
CALCIUM SERPL-MCNC: 8.5 MG/DL (ref 8.7–10.5)
CHLORIDE SERPL-SCNC: 103 MMOL/L (ref 98–107)
CHOLEST SERPL-MCNC: 160 MG/DL
CHOLEST/HDLC SERPL: 4 {RATIO} (ref 0–5)
CO2 SERPL-SCNC: 31 MMOL/L (ref 23–31)
CREAT SERPL-MCNC: 1.18 MG/DL (ref 0.73–1.18)
ERYTHROCYTE [DISTWIDTH] IN BLOOD BY AUTOMATED COUNT: 14.9 % (ref 11.5–17)
GLOBULIN SER-MCNC: 2.5 G/DL (ref 2.4–3.5)
GLUCOSE SERPL-MCNC: 182 MG/DL (ref 82–115)
HCT VFR BLD AUTO: 45.5 % (ref 42–52)
HDLC SERPL-MCNC: 45 MG/DL (ref 35–60)
HEMOLYSIS INTERF INDEX SERPL-ACNC: 6
HGB BLD-MCNC: 15 G/DL (ref 14–18)
ICTERIC INTERF INDEX SERPL-ACNC: 5
LDLC SERPL CALC-MCNC: 99 MG/DL (ref 50–140)
LIPEMIC INTERF INDEX SERPL-ACNC: <0
MCH RBC QN AUTO: 29 PG (ref 27–31)
MCHC RBC AUTO-ENTMCNC: 33 G/DL (ref 33–36)
MCV RBC AUTO: 88 FL (ref 80–94)
PLATELET # BLD AUTO: 144 10*3/UL (ref 130–400)
PMV BLD AUTO: 10.6 FL (ref 9.4–12.4)
POTASSIUM SERPL-SCNC: 3.9 MMOL/L (ref 3.5–5.1)
PROT SERPL-MCNC: 5.6 G/DL (ref 5.8–7.6)
RBC # BLD AUTO: 5.17 10*6/UL (ref 4.7–6.1)
SODIUM SERPL-SCNC: 145 MMOL/L (ref 136–145)
TRIGL SERPL-MCNC: 82 MG/DL (ref 34–140)
VLDLC SERPL CALC-MCNC: 16 MG/DL
WBC # SPEC AUTO: 3.1 10*3/UL (ref 4.5–11.5)

## 2022-04-08 ENCOUNTER — HISTORICAL (OUTPATIENT)
Dept: ADMINISTRATIVE | Facility: HOSPITAL | Age: 62
End: 2022-04-08

## 2022-04-08 ENCOUNTER — HISTORICAL (OUTPATIENT)
Dept: LAB | Facility: HOSPITAL | Age: 62
End: 2022-04-08

## 2022-04-08 LAB — SARS-COV-2 AG RESP QL IA.RAPID: NEGATIVE

## 2022-04-25 ENCOUNTER — HISTORICAL (OUTPATIENT)
Dept: RESPIRATORY THERAPY | Facility: HOSPITAL | Age: 62
End: 2022-04-25

## 2022-04-29 NOTE — H&P
Patient:   Tashi Deluna            MRN: [id number]            FIN: [id number]               Age:   60 years     Sex:  Male     :  1960   Associated Diagnoses:   None   Author:   Kyler Mckeon MD A      History of Present Illness   60 year old male with personal history of colon polyps patient tolerated the prep.  And preoperative evaluations found to be in A. fib with RVR heart rate 170s.  He did have an ablation but apparently his back in A. fib.  Anesthesia was evaluated in the case will likely cancel because of poor rate control.      Health Status   Allergies:    Allergic Reactions (Selected)  Severity Not Documented  Amoxicillin- Rash.,    Allergies (1) Active Reaction  amoxicillin RASH     Current medications:  (Selected)   Inpatient Medications  Ordered  Buffered Lidocaine 1% - 1mL syringe: 0.5 mL, 5 mg =, form: Injection, ID, As Directed PRN for other (see comment), first dose 03/10/21 6:54:00 CST, May inject 0.5mL at IV site, if not allergic  Plasmalyte 1,000 mL: 1,000 mL, 1,000 mL, IV, 20 mL/hr, start date 03/10/21 6:54:00 CST, 1.72, m2  Prescriptions  Prescribed  Carafate 1 g oral tablet: 1 gm = 1 tab(s), Oral, QIDACHS, # 28 tab(s), 0 Refill(s), Pharmacy: [id number], 175, cm, Height/Length Dosing, 20 9:21:00 CDT, 82.3, kg, Weight Dosing, 20 9:21:00 CDT  Eliquis 5 mg oral tablet: 5 mg = 1 tab(s), Oral, BID, # 60 tab(s), 11 Refill(s), Pharmacy: [id number]  HumuLIN R 100 units/mL injectable solution: See Instructions, TIDAC per sliding scale, # 1 vial(s), 0 Refill(s), [facility]: [id number]  Lanhenrikus Solostar Pen 100 units/mL subcutaneous solution: 20 units, Subcutaneous, Once a day (at bedtime), # 15 mL, 0 Refill(s)  Protonix 40 mg ORAL enteric coated tablet: 40 mg = 1 tab(s), Oral, Daily, # 30 tab(s), 0 Refill(s), Pharmacy: [id number], 175, cm, Height/Length Dosing, 20 9:21:00 CDT, 82.3, kg, Weight Dosing, 20 9:21:00  CDT  sotalol 80 mg oral tablet: 80 mg = 1 tab(s), Oral, BID, # 60 tab(s), 5 Refill(s), Pharmacy: [id number]  Documented Medications  Documented  Lovenox 100mg Inj: 100 mg, Subcutaneous, q12hr  Suprep Bowel Prep Kit oral liquid:   metoprolol succinate 25 mg oral tablet, extended release: 25 mg = 1 tab(s), Oral, Daily   Problem list:    All Problems  Acid reflux / SNOMED CT 3306979226 / Confirmed  AF - Atrial fibrillation / SNOMED CT 5440120056 / Confirmed  Diabetes mellitus / SNOMED CT 769428676 / Confirmed  Necrosis of pancreas / SNOMED CT 9887036 / Confirmed  PAF - Paroxysmal atrial fibrillation / SNOMED CT 294883809 / Confirmed  PUD - Peptic ulcer disease / SNOMED CT 0534374653 / Confirmed,    Active Problems (6)  Acid reflux   AF - Atrial fibrillation   Diabetes mellitus   Necrosis of pancreas   PAF - Paroxysmal atrial fibrillation   PUD - Peptic ulcer disease         Histories   Past Medical History:    Active  PAF - Paroxysmal atrial fibrillation (732149327)  PUD - Peptic ulcer disease (5873852207)  Necrosis of pancreas (7345254)  Resolved  dm (811681541):  Resolved.  HTN - Hypertension (0351635946):  Resolved.  Esophageal stricture (084273319):  Resolved.   Family History:    No family history items have been selected or recorded.   Procedure history:    Foreign Body Removal on 9/11/2020 at 60 Years.  Comments:  9/17/2020 12:22 Janey Rob RN  auto-populated from documented surgical case  Balloon Dilation Gastrointestional on 9/11/2020 at 60 Years.  Comments:  9/17/2020 12:22 Janey Rob RN  auto-populated from documented surgical case  Esophagogastroduodenoscopy on 9/11/2020 at 60 Years.  Comments:  9/17/2020 12:22 Janey Rob RN  auto-populated from documented surgical case  Esophagogastroduodenoscopy on 9/9/2019 at 59 Years.  Comments:  9/9/2019 8:35 ADE Reyes RN, Sandy Elizondo  auto-populated from documented surgical case  Savory Dilation on  9/9/2019 at 59 Years.  Comments:  9/9/2019 8:35 Sandy Ngo RN  auto-populated from documented surgical case  Transesophageal Echo (for Surgery) (.) on 8/27/2019 at 59 Years.  Comments:  8/27/2019 14:03 Veronique Harris RN  auto-populated from documented surgical case  Cardioversion (.) on 8/27/2019 at 59 Years.  Comments:  8/27/2019 14:03 Veronique Harris RN  auto-populated from documented surgical case  Esophagogastroduodenoscopy on 8/5/2019 at 59 Years.  Comments:  8/5/2019 8:19 Sandy Nog RN  auto-populated from documented surgical case  Savory Dilation on 8/5/2019 at 59 Years.  Comments:  8/5/2019 8:19 Sandy Ngo RN  auto-populated from documented surgical case  Esophagogastroduodenoscopy on 7/1/2019 at 59 Years.  Comments:  7/2/2019 16:23 Te Jimenez RN  auto-populated from documented surgical case  Biopsy Gastrointestinal on 7/1/2019 at 59 Years.  Comments:  7/2/2019 16:23 Te Jimenez RN  auto-populated from documented surgical case  Savory Dilation on 7/1/2019 at 59 Years.  Comments:  7/2/2019 16:23 Te Jimenez RN  auto-populated from documented surgical case  Biopsy Gastrointestinal on 3/18/2019 at 59 Years.  Comments:  3/18/2019 9:08 Michelle Alegria RN  auto-populated from documented surgical case  Savory Dilation on 3/18/2019 at 59 Years.  Comments:  3/18/2019 9:08 Michelle Alegria RN  auto-populated from documented surgical case  Esophagogastroduodenoscopy on 3/18/2019 at 59 Years.  Comments:  3/18/2019 9:08 Michelle Alegria RN  auto-populated from documented surgical case  Exploration Laparotomy (.) on 6/11/2018 at 58 Years.  Comments:  6/11/2018 19:51 ADE Rosales RN, [place]  auto-populated from documented surgical case  Partial pancreatectomy (175989185) on 10/1/2010 at 50 Years.  Acute gastric ulcer with bleeding (725987336) on 10/1/2010 at  50 Years.  Acute pancreatitis (215772891) on 10/1/2010 at 50 Years.  Appendectomy (198344143).   Social History        Social & Psychosocial Habits    Alcohol  03/18/2019  Use: Never    08/24/2019  Use: Never    Tobacco  06/10/2018  Use: Former smoker    03/18/2019  Use: Former smoker, quit more    Patient Wants Consult For Cessation Counseling N/A    07/01/2019  Use: Former smoker, quit more    Patient Wants Consult For Cessation Counseling No    08/05/2019  Use: Former smoker, quit more    Patient Wants Consult For Cessation Counseling N/A    Comment: quit in 1992 - 08/05/2019 07:23 Michael Blum RN    08/24/2019  Use: Former smoker, quit more    Patient Wants Consult For Cessation Counseling No    08/24/2019  Use: Former smoker, quit more    Patient Wants Consult For Cessation Counseling N/A    06/16/2020  Use: Former smoker, quit more    Patient Wants Consult For Cessation Counseling N/A    Comment: quit 27 yrs ago - 09/09/2019 07:08 Michael Blum RN    06/23/2020  Use: Former smoker, quit more    Patient Wants Consult For Cessation Counseling N/A    03/09/2021  Use: Former smoker, quit more    Patient Wants Consult For Cessation Counseling N/A    03/10/2021  Use: Former smoker, quit more    Patient Wants Consult For Cessation Counseling No    Abuse/Neglect  07/01/2019  SHX Any signs of abuse or neglect No    08/05/2019  SHX Any signs of abuse or neglect No    Feels unsafe at home: No    Safe place to go: Yes    08/24/2019  SHX Any signs of abuse or neglect No    08/24/2019  SHX Any signs of abuse or neglect No    06/16/2020  SHX Any signs of abuse or neglect No    Feels unsafe at home: No    Safe place to go: Yes    06/23/2020  SHX Any signs of abuse or neglect No    03/09/2021  SHX Any signs of abuse or neglect No    03/10/2021  SHX Any signs of abuse or neglect No    Spiritual/Cultural  06/16/2020  [employer] Preference [employer]  .        Physical Examination      Vital Signs (last 24 hrs)_____  Last  Charted___________  Resp Rate         16 br/min  (MAR 10 06:57)  SBP      103 mmHg  (MAR 10 06:57)  DBP      73 mmHg  (MAR 10 06:57)  SpO2      97 %  (MAR 10 06:57)  Weight      78 kg  (MAR 10 06:52)  Height      149 cm  (MAR 10 06:52)  BMI      35.13  (MAR 10 06:52)     Measurements from flowsheet : Measurements   3/10/2021 6:52 CST       Weight Dosing             78 kg                             Weight Measured           78 kg                             Weight Measured and Calculated in Lbs     171.96 lb                             Height/Length Dosing      149 cm                             Height/Length Measured    149 cm                             Body Mass Index Measured  35.13 kg/m2        Health Maintenance      Health Maintenance     Pending (in the next year)        OverDue           ADL Screening due  06/10/19  and every 1  year(s)           Alcohol Misuse Screening due  01/02/21  and every 1  year(s)        Due            Aspirin Therapy for CVD Prevention due  03/10/21  and every 1  year(s)           Medicare Annual Wellness Exam due  03/10/21  and every 1  year(s)           Tetanus Vaccine due  03/10/21  and every 10  year(s)        Due In Future            Diabetes Maintenance-Fasting Lipid Profile not due until  08/10/21  and every 1  year(s)           Obesity Screening not due until  01/01/22  and every 1  year(s)           Diabetes Maintenance-Serum Creatinine not due until  02/01/22  and every 1  year(s)     Satisfied (in the past 1 year)        Satisfied            Blood Pressure Screening on  03/10/21.  Satisfied by Rocío Montanez           Body Mass Index Check on  03/10/21.  Satisfied by Rocío Montanez           Diabetes Maintenance-Fasting Lipid Profile on  08/10/20.  Satisfied by Sugar Solorzano           Diabetes Maintenance-HgbA1c on  02/01/21.  Satisfied by Jacque Berg           Diabetes Maintenance-Serum Creatinine on  02/01/21.  Satisfied by Jacque Berg            Diabetes Screening on  02/01/21.  Satisfied by Jacque Berg           Hypertension Management-Blood Pressure on  03/10/21.  Satisfied by Rocío Montanez           Hypertension Management-BMP on  02/01/21.  Satisfied by Jacque Berg           Lipid Screening on  08/10/20.  Satisfied by Sugar Solorzano           Obesity Screening on  03/10/21.  Satisfied by Rocío Montanez          Review / Management   Results review:     No qualifying data available.

## 2022-04-29 NOTE — OP NOTE
Patient:   Tashi Deluna            MRN: [id number]            FIN: [id number]               Age:   61 years     Sex:  Male     :  1960   Associated Diagnoses:   None   Author:   Kin Hubbard MD      Cardiologist: Dr. Kin Hubbard    Assistant: _    Preoperative Diagnosis(es):  [ X ] Atrial fibrillation  [ _ ] Artial flutter  [ _ ] Artial tachycardia    Postoperative Diagnosis(es):  [ X ] Normal Sinus rhythm  [ _ ] Sinus bradycardia  [ _ ] Artial fibrillation  [ _ ] Sinus with third-degree atrioventricular block  [ _ ] A-V sequential pacing/capture    Procedure(s):  Electrical cardioversion    Complications:  None    Description of Procedure:  After informed consent was obtained and permit signed, the patient was transported to the Cardiac Electrophysiology Laboratory. The EKG electrodes and [ _ ]cardioverter / [ _ ]defibrillator pads were applied to the patient. Pulse oximetry and ventilation were monitored, with oxygen and ventilation assistance given as needed. Adequate sedation for the procedure was accomplished by [X]the Anesthesia service/[_] IV Versed and Fentanyl. Synchronized direct-current energy was delivered at a level of 200 joules biphasic. Additional energy was [X] not indicated/[_] indicated at _ joules biphasic. Sinus rhythm[X]was/[_] was not restored. Upon awakening, the patient was transferred in a stable condition to a monitored bed for recovery.

## 2022-04-29 NOTE — H&P
Patient:   Tashi Deluna            MRN: [id number]            FIN: [id number]               Age:   59 years     Sex:  Male     :  1960   Associated Diagnoses:   None   Author:   Kyler Mckeon MD A      Chief Complaint   59-year-old male with a history of refractory esophageal stricture requiring serial dilation.  His last dilation was with a 30 [place] of [place] savory with a moderate mucosal tear.  He had some improvement in his symptoms.  He denies nausea vomiting but does have dysphagia and reflux is on no blood thinners      Health Status   Allergies:    Allergic Reactions (Selected)  Severity Not Documented  Amoxicillin- Rash.,    Allergies (1) Active Reaction  amoxicillin RASH     Current medications:  (Selected)   Prescriptions  Prescribed  HumuLIN R 100 units/mL injectable solution: See Instructions, TIDAC per sliding scale, # 1 vial(s), 0 Refill(s), Pharmacy: [id number]  Lantus Solostar Pen 100 units/mL subcutaneous solution: 20 units, Subcutaneous, Once a day (at bedtime), # 15 mL, 0 Refill(s)  metoprolol tartrate 25 mg oral tab: 12.5 mg = 0.5 tab(s), Oral, BID, # 30 tab(s), 0 Refill(s)   Problem list:    No qualifying data available        Histories   Past Medical History:    Resolved  dm (774275569):  Resolved.  HTN - Hypertension (0585054943):  Resolved.  Esophageal stricture (579581934):  Resolved.   Family History:    No family history items have been selected or recorded.   Procedure history:    Biopsy Gastrointestinal on 3/18/2019 at 59 Years.  Comments:  3/18/2019 9:08 Michelle Alegria RN  auto-populated from documented surgical case  Savory Dilation on 3/18/2019 at 59 Years.  Comments:  3/18/2019 9:08 Michelle Alegria RN  auto-populated from documented surgical case  Esophagogastroduodenoscopy on 3/18/2019 at 59 Years.  Comments:  3/18/2019 9:08 Michelle Alegria RN  auto-populated from documented surgical case  Exploration Laparotomy (.) on  6/11/2018 at 58 Years.  Comments:  6/11/2018 19:51 LAURENT - Bobby DURHAM, [place]  auto-populated from documented surgical case   Social History        Social & Psychosocial Habits    Alcohol  03/18/2019  Use: Never    Tobacco  06/10/2018  Use: Former smoker    03/18/2019  Use: Former smoker, quit more    Patient Wants Consult For Cessation Counseling N/A  .        Physical Examination   General:  Alert and oriented, No acute distress.    Eye:  Pupils are equal, round and reactive to light, Extraocular movements are intact, Normal conjunctiva.    HENT:  Normocephalic, Normal hearing, No pharyngeal erythema.    Neck:  Supple, Non-tender, No lymphadenopathy.    Respiratory:  Lungs are clear to auscultation, Respirations are non-labored, Breath sounds are equal.    Cardiovascular:  Normal rate, Regular rhythm, No murmur.    Gastrointestinal:  Soft, Non-tender, Non-distended, Normal bowel sounds, No organomegaly.       No qualifying data available   Lymphatics:  No lymphadenopathy neck, axilla, groin.    Musculoskeletal:  Normal range of motion, Normal strength, No tenderness, Normal gait.    Integumentary:  Warm, Moist, No rash.    Neurologic:  Alert, Oriented, Normal sensory, Normal motor function, No focal deficits.    Psychiatric:  Cooperative, Appropriate mood & affect.       Health Maintenance      Health Maintenance     Pending (in the next year)        OverDue           Diabetes Maintenance-Fasting Lipid Profile due  09/29/18  and every 1  year(s)           Alcohol Misuse Screening due  01/01/19  and every 1  year(s)           Obesity Screening due  01/01/19  and every 1  year(s)           ADL Screening due  06/10/19  and every 1  year(s)        Due            Aspirin Therapy for CVD Prevention due  07/01/19  and every 1  year(s)           Blood Pressure Screening due  07/01/19  and every            Colorectal Screening due  07/01/19  and every            Depression Screening due  07/01/19  and every             Diabetes Maintenance-Eye Exam due  07/01/19  and every            Diabetes Maintenance-Foot Exam due  07/01/19  and every            Hypertension Management-Blood Pressure due  07/01/19  and every            Tetanus Vaccine due  07/01/19  and every 10  year(s)           Zoster Vaccine due  07/01/19  and every 100  year(s)        Due In Future            Diabetes Maintenance-HgbA1c not due until  09/21/19  and every 1  year(s)           Hypertension Management-BMP not due until  09/21/19  and every 1  year(s)           Body Mass Index Check not due until  03/17/20  and every 1  year(s)     Satisfied (in the past 1 year)        Satisfied            Blood Pressure Screening on  03/18/19.  Satisfied by Te Fuentes RN           Diabetes Maintenance-HgbA1c on  09/21/18.  Satisfied by Jacque Berg           Diabetes Screening on  09/21/18.  Satisfied by Jacque Berg           Hypertension Management-Blood Pressure on  03/18/19.  Satisfied by Te Fuentes RN          Review / Management   Results review:     No qualifying data available.       Impression and Plan   plan for egd with dilation

## 2022-04-29 NOTE — ED PROVIDER NOTES
Patient:   Tashi Deluna            MRN: [id number]            FIN: [id number]               Age:   60 years     Sex:  Male     :  1960   Associated Diagnoses:   Esophageal foreign body   Author:   Claudy Ramey MD      Report Summary       General:       Alert, no acute distress.       Basic Information   Time seen: Immediately upon arrival.   History source: Patient.   Arrival mode: Ambulance.   History limitation: None.   Additional information: Chief Complaint from Nursing Triage Note : Chief Complaint   2020 11:59 CDT      Chief Complaint           transfer from [facility] for food bolus.  .   Provider/Visit info:   Time Seen:  Claudy Ramey MD / 2020 12:15  .   History of Present Illness   The patient presents with difficulty swallowing.  The onset was 12  hours ago.  The course/duration of symptoms is constant and improving.  Character of symptoms foreign body sensation, unable to swallow cannot swallow liquids.  The degree at present is moderate.  There are exacerbating factors including eating and drinking.  The relieving factor is none.  Risk factors consist of esophageal stricture and foreign body ingestion.  Prior episodes: occasional.  Therapy today: Was given glucagon and Reglan with no improvement.  Associated symptoms: none.  60-year-old male here as a transfer from [facility] for possible food bolus in esophagus.  States that last night he had a hotdog not sure if it into enough but immediately had sensation of that being stuck just at the level of the clavicle and spent a good bit of the night until going to the ER this morning try to get that out unable to tolerate liquids.  States that he is had multiple esophageal stricture dilations in the past most recently earlier this year.  Follows with Dr. Severino.  No complaints of shortness of breath or chest pain.  Denies any nausea at the time.  Given Reglan and glucagon with no relief at the outside hospital and again was  transferred here.  .        Review of Systems   Constitutional symptoms:  Negative except as documented in HPI.   Skin symptoms:  Negative except as documented in HPI.   Eye symptoms:  Negative except as documented in HPI.   ENMT symptoms:  Negative except as documented in HPI.   Respiratory symptoms:  Negative except as documented in HPI.   Cardiovascular symptoms:  Negative except as documented in HPI.   Gastrointestinal symptoms:  Negative except as documented in HPI.   Genitourinary symptoms:  Negative except as documented in HPI.   Musculoskeletal symptoms:  Negative except as documented in HPI.   Neurologic symptoms:  Negative except as documented in HPI.   Psychiatric symptoms:  Negative except as documented in HPI.   Endocrine symptoms:  Negative except as documented in HPI.   Hematologic/Lymphatic symptoms:  Negative except as documented in HPI.   Allergy/immunologic symptoms:  Negative except as documented in HPI.      Health Status   Allergies:    Allergies reviewed..   Medications: Unknown.   Immunizations: Unknown.      Past Medical/ Family/ Social History   Medical history: Reviewed as documented in chart.   Surgical history: Reviewed as documented in chart.   Family history: Reviewed as documented in chart.   Social history: Reviewed as documented in chart.      Physical Examination               Vital Signs   Oxygen saturation.   General:  Alert, no acute distress.    Skin:  Warm, dry, no rash.    Head:  Normocephalic, atraumatic.    Neck:  Supple, trachea midline, no JVD.    Eye:  Pupils are equal, round and reactive to light, extraocular movements are intact, normal conjunctiva, vision unchanged.    Ears, nose, mouth and throat:  Tympanic membranes clear, oral mucosa moist, no pharyngeal erythema or exudate.    Cardiovascular:  Regular rate and rhythm, No murmur, No edema.    Respiratory:  Lungs are clear to auscultation, respirations are non-labored, breath sounds are equal, Symmetrical chest wall  expansion.    Chest wall:  No tenderness.   Back:  Normal range of motion.   Musculoskeletal:  Normal ROM, normal strength, no tenderness.    Gastrointestinal:  Soft, Nontender, Non distended, Normal bowel sounds, No organomegaly.    Neurological:  Alert and oriented to person, place, time, and situation, No focal neurological deficit observed, CN II-XII intact, normal sensory observed, normal motor observed, normal speech observed.    Psychiatric:  Cooperative, appropriate mood & affect, normal judgment.             Medical Decision Making   Differential Diagnosis:  Esophageal foreign body.   Rationale:  Elderly male with a history of esophageal stricture status post multiple dilatations here for likely food bolus transfer from outside hospital.  Stable at the moment, vital signs stable as well and unremarkable exam.  Patient kept n.p.o. and will undergo scope today with GI after speaking with Dr. Schofield at 1225.      Impression and Plan   Diagnosis   Esophageal foreign body (LVX17-TC T18.108A)      Calls-Consults   -  9/11/2020 12:25:00 , Kanwal MOISE, Jose Cruz HUTTON    Plan   Condition: Stable.    Disposition: GI Lab.    Counseled: Patient, Regarding diagnosis, Regarding diagnostic results, Regarding treatment plan, Patient indicated understanding of instructions.    Notes: Patient examined and note completed without assistance of scribe..

## 2022-04-29 NOTE — H&P
HISTORY OF PRESENT ILLNESS:  A 59-year-old white man with a recurrent peptic esophageal stricture.  Presents now for repeat EGD and dilatation.  The patient has been undergoing dilatations since March of this year.  He may have undergone other dilatations at [facility], but from records here, he underwent a dilatation in March, July, and his last dilatation was August 5th.  He states that his dilatations in the past have been helpful.  He was dilated last to 36-Micronesian over a guidewire.  He states his weight is stable.    ALLERGIES:  Amoxicillin.    MEDICATIONS:    1. Omeprazole.    2. Carafate.    3. Lantus insulin.    4. Eliquis, which he stopped 3 days ago.    5. Sotalol.    PAST MEDICAL HISTORY:  Atrial fibrillation, recent cardioversion, type 1 diabetes mellitus, history of peptic ulcer disease, history of pancreatitis secondary to peptic ulcer disease.    PAST SURGICAL HISTORY:  Surgery for perforation of his stomach in 2010, subsequent partial pancreatectomy and multiple other surgeries since that time.    SOCIAL HISTORY:  Former smoker.  No alcohol.    PHYSICAL EXAMINATION:  VITAL SIGNS:  Stable, afebrile.     GENERAL:  He is a middle-aged man, in no acute distress.     HEENT:  Sclerae nonicteric.  Conjunctivae pink.  No adenopathy or thyromegaly.   CARDIOVASCULAR:  Regular rate and rhythm.   LUNGS:  Clear.   ABDOMEN:  Soft, nontender.  Bowel sounds normal.  No mass or organomegaly appreciated.   EXTREMITIES:  No clubbing, cyanosis or edema.     NEUROLOGIC:  Alert and oriented.  No focal deficits.    IMPRESSION:  Dysphagia, secondary to recurrent esophageal stricture.      RECOMMENDATIONS:  Will proceed with EGD and dilatation.        ______________________________  MD NILS Boggs/ERNIE  DD:  09/09/2019  Time:  08:10AM  DT:  09/09/2019  Time:  08:22AM  Job #:  [id number]    The H&P was reviewed, the patient was examined, and the following changes to the patients condition are  noted:  ______________________________________________________________________________  ______________________________________________________________________________  ______________________________________________________________________________  [  ] No changes to the patient's condition:      ______________________________                                             ___________________  PHYSICIAN SIGNATURE                                                             DATE/TIME    cc: Kyler TERRY Mckeon, MD

## 2022-04-29 NOTE — H&P
Patient:   Tashi Deluna            MRN: [id number]            FIN: [id number]               Age:   61 years     Sex:  Male     :  1960   Associated Diagnoses:   None   Author:   Kyler Mckeon MD A      History of Present Illness   61-year-old male referred for screening colonoscopy.  He was initially scheduled but was in A. fib so he needed to cancel due to cardiac clearance.  Is never had a colonoscopy before he denies any bleeding or abdominal pain or weight loss she is on Eliquis which she stopped Saturday.  Denies any family history colon cancer colon polyps      Health Status   Allergies:    Allergic Reactions (Selected)  Severity Not Documented  Amoxicillin- Rash.   Current medications:  (Selected)   Inpatient Medications  Ordered  Buffered Lidocaine 1% - 1mL syringe: 0.5 mL, 5 mg =, form: Injection, ID, As Directed PRN for other (see comment), first dose 21 6:28:00 CDT, May inject 0.5mL at IV site, if not allergic  Plasmalyte 1,000 mL: 1,000 mL, 1,000 mL, IV, 50 mL/hr, start date 21 6:28:00 CDT, 1.96, m2  Prescriptions  Prescribed  Eliquis 5 mg oral tablet: 5 mg = 1 tab(s), Oral, BID, # 60 tab(s), 11 Refill(s), Pharmacy: [facility] #[id number]  HumuLIN R 100 units/mL injectable solution: See Instructions, TIDAC per sliding scale, # 1 vial(s), 0 Refill(s), [facility]: [id number]  Lantus Solostar Pen 100 units/mL subcutaneous solution: 20 units, Subcutaneous, Once a day (at bedtime), # 15 mL, 0 Refill(s)  sotalol 80 mg oral tablet: 80 mg = 1 tab(s), Oral, BID, # 60 tab(s), 5 Refill(s), Pharmacy: [facility] #[id number]  Documented Medications  Documented  Suprep Bowel Prep Kit oral liquid:   metoprolol succinate 50 mg oral tablet, extended release: 50 mg = 1 tab(s), Oral, Daily,    Medications (2) Active  Scheduled: (0)  Continuous: (1)  Electrolyte (Plasmalyte)  LVP solution 1,000 mL  1,000 mL, IV, 50 mL/hr  PRN: (1)  Buffered lidocaine 1% - 1mL  5 mg 0.5 mL, ID, As Directed      Problem list:    All Problems  Acid reflux / SNOMED CT 9308789954 / Confirmed  AF - Atrial fibrillation / SNOMED CT 3915830808 / Confirmed  Diabetes mellitus / SNOMED CT 924611459 / Confirmed  Necrosis of pancreas / SNOMED CT 4814408 / Confirmed  PAF - Paroxysmal atrial fibrillation / SNOMED CT 014142366 / Confirmed  PUD - Peptic ulcer disease / SNOMED CT 0803509160 / Confirmed      Histories   Past Medical History:    Active  PAF - Paroxysmal atrial fibrillation (072191386)  PUD - Peptic ulcer disease (9497345508)  Necrosis of pancreas (9576156)  Resolved  dm (948876730):  Resolved.  HTN - Hypertension (3994544133):  Resolved.  Esophageal stricture (565751107):  Resolved.   Family History:    No family history items have been selected or recorded.   Procedure history:    Cardioversion (., None) on 4/19/2021 at 61 Years.  Comments:  4/19/2021 8:48 Emma Rosario RN  auto-populated from documented surgical case  Foreign Body Removal on 9/11/2020 at 60 Years.  Comments:  9/17/2020 12:22 Janey Rob RN  auto-populated from documented surgical case  Balloon Dilation Gastrointestional on 9/11/2020 at 60 Years.  Comments:  9/17/2020 12:22 Janey Rob RN  auto-populated from documented surgical case  Esophagogastroduodenoscopy on 9/11/2020 at 60 Years.  Comments:  9/17/2020 12:22 Janey Rob RN  auto-populated from documented surgical case  Esophagogastroduodenoscopy on 9/9/2019 at 59 Years.  Comments:  9/9/2019 8:35 Sandy Ngo RN  auto-populated from documented surgical case  Savory Dilation on 9/9/2019 at 59 Years.  Comments:  9/9/2019 8:35 Sandy Ngo RN  auto-populated from documented surgical case  Transesophageal Echo (for Surgery) (.) on 8/27/2019 at 59 Years.  Comments:  8/27/2019 14:03 Veronique Harris RN  auto-populated from documented surgical case  Cardioversion (.) on 8/27/2019 at 59 Years.  Comments:  8/27/2019 14:03  Veronique Harris RN  auto-populated from documented surgical case  Esophagogastroduodenoscopy on 8/5/2019 at 59 Years.  Comments:  8/5/2019 8:19 Sandy Ngo RN  auto-populated from documented surgical case  Savory Dilation on 8/5/2019 at 59 Years.  Comments:  8/5/2019 8:19 Sandy Ngo RN  auto-populated from documented surgical case  Esophagogastroduodenoscopy on 7/1/2019 at 59 Years.  Comments:  7/2/2019 16:23 Te Jimenez RN  auto-populated from documented surgical case  Biopsy Gastrointestinal on 7/1/2019 at 59 Years.  Comments:  7/2/2019 16:23 Te Jimenez RN  auto-populated from documented surgical case  Savory Dilation on 7/1/2019 at 59 Years.  Comments:  7/2/2019 16:23 Te Jimenez RN  auto-populated from documented surgical case  Biopsy Gastrointestinal on 3/18/2019 at 59 Years.  Comments:  3/18/2019 9:08 Michelle Alegria RN  auto-populated from documented surgical case  Savory Dilation on 3/18/2019 at 59 Years.  Comments:  3/18/2019 9:08 Michelle Alegria RN  auto-populated from documented surgical case  Esophagogastroduodenoscopy on 3/18/2019 at 59 Years.  Comments:  3/18/2019 9:08 Michelle Alegria RN  auto-populated from documented surgical case  Exploration Laparotomy (.) on 6/11/2018 at 58 Years.  Comments:  6/11/2018 19:51 Mt Leigh RN  auto-populated from documented surgical case  Partial pancreatectomy (262555721) on 10/1/2010 at 50 Years.  Acute gastric ulcer with bleeding (424195813) on 10/1/2010 at 50 Years.  Acute pancreatitis (663695152) on 10/1/2010 at 50 Years.   Social History        Social & Psychosocial Habits    Alcohol  03/18/2019  Use: Never    08/24/2019  Use: Never    Tobacco  06/10/2018  Use: Former smoker    05/11/2021  Use: Former smoker, quit more    Patient Wants Consult For Cessation Counseling No    05/12/2021  Use: Former smoker, quit  more    Patient Wants Consult For Cessation Counseling No    Abuse/Neglect  04/19/2021  SHX Any signs of abuse or neglect No    Feels unsafe at home: No    05/11/2021  SHX Any signs of abuse or neglect No    05/12/2021  SHX Any signs of abuse or neglect No    Spiritual/Cultural  06/16/2020  [employer] Preference Religious  .        Physical Examination   Vital Signs   5/12/2021 7:03 CDT       Temperature Tympanic      36.4 DegC                             Peripheral Pulse Rate     126 bpm  HI                             Heart Rate Monitored      126 bpm  HI                             Respiratory Rate          17 br/min                             SpO2                      98 %                             Oxygen Therapy            Room air                             Systolic Blood Pressure   124 mmHg                             Diastolic Blood Pressure  71 mmHg                             Mean Arterial Pressure, Cuff              89 mmHg                             Blood Pressure Location   Left arm        Vital Signs (last 24 hrs)_____  Last Charted___________  Temp Tympanic    36.4 DegC  (MAY 12 07:03)  Heart Rate Peripheral   H 126bpm  (MAY 12 07:03)  Resp Rate         17 br/min  (MAY 12 07:03)  SBP      124 mmHg  (MAY 12 07:03)  DBP      71 mmHg  (MAY 12 07:03)  SpO2      98 %  (MAY 12 07:03)  Weight      80 kg  (MAY 12 07:03)  Height      175 cm  (MAY 12 07:03)  BMI      26.12  (MAY 12 07:03)        Review / Management   Results review:     No qualifying data available.

## 2022-04-29 NOTE — H&P
Patient:   Tashi Deluna            MRN: [id number]            FIN: [id number]               Age:   59 years     Sex:  Male     :  1960   Associated Diagnoses:   None   Author:   Enrico Ramirez MD      CC: Dysphagia    Subjective: 59 year old with peptic stricture here for repeat EGD with dilation.  Dilated to 33 F with savary at last session.  Symptoms improved since that time.     Past Medical History: GERD    Review of Systems:  negative except improved dysphagia    Physical Exam:  General appearance: alert, cooperative, no distress  HENT: Normocephalic, atraumatic, neck symmetrical, no nasal discharge   Lungs: clear to auscultation bilaterally, symmetric chest wall expansion bilaterally  Heart: regular rate and rhythm without rub; no displacement of the PMI   Abdomen: soft, non-tender, non-distended, normal bowel sounds  Extremities: extremities symmetric; no clubbing, cyanosis, or edema  Neurologic: Alert and oriented X 3, normal strength, normal coordination and gait      Labs:  Labs Last 24 Hours   No qualifying data available.    Imaging:  none    Assessment:  Dysphagia 2/2 peptic stricture    Plan:  EGD with dilation

## 2022-04-29 NOTE — H&P
Patient:   Tashi Deluna            MRN: [id number]            FIN: [id number]               Age:   59 years     Sex:  Male     :  1960   Associated Diagnoses:   None   Author:   Kyler Mckeon MD      Basic Information   Source of history:  Self.       Chief Complaint   59-year-old male with a known distal esophageal stricture.  Last time in November we were unable to get the standard gastroscope through the stricture.  We were able to dilated with a guidewire passing carefully to the stricture all the way up to a 15, 21, 27 [place] savory.  There is moderate resistance and a moderate mucosal tear.  We are unable to advance the scope through the stricture but a cause of the tear would not want to push aggressively.  Was recommended to repeat EGD with dilation in 4 weeks but it is been some time since then.  Patient has had some issues with compliance and recommendations.  He does chew his food well.  His most of his diet.  His weight has been stable but fluctuates from 180-200.  He denies any alarm symptoms or any other blood thinners.      Review of Systems   Constitutional:  Negative.    Gastrointestinal:  Heartburn, dysphagia.       Health Status   Allergies:    Allergic Reactions (Selected)  Severity Not Documented  Amoxicillin- Rash.,    Allergies (1) Active Reaction  amoxicillin RASH   Current medications:  (Selected)   Prescriptions  Prescribed  HumuLIN R 100 units/mL injectable solution: See Instructions, TIDAC per sliding scale, # 1 vial(s), 0 Refill(s), [facility]: [id number]  Lantoni Solostar Pen 100 units/mL subcutaneous solution: 20 units, Subcutaneous, Once a day (at bedtime), # 15 mL, 0 Refill(s)  metoprolol tartrate 25 mg oral tab: 12.5 mg = 0.5 tab(s), Oral, BID, # 30 tab(s), 0 Refill(s)   Problem list:    No qualifying data available      Histories   Past Medical History:    Resolved  dm (112500554):  Resolved.  HTN - Hypertension (0625670241):  Resolved.  Esophageal  stricture (373578706):  Resolved.   Family History:    No family history items have been selected or recorded.   Procedure history:    Exploration Laparotomy (.) on 6/11/2018 at 58 Years.  Comments:  6/11/2018 19:51 - Bobby DURHMA, [place]  auto-populated from documented surgical case   Social History        Social & Psychosocial Habits    Tobacco  06/10/2018  Use: Former smoker.        Physical Examination   General:  Alert and oriented, No acute distress.    Eye:  Pupils are equal, round and reactive to light, Extraocular movements are intact, Normal conjunctiva.    HENT:  Normocephalic, Normal hearing, No pharyngeal erythema.    Neck:  Supple, Non-tender, No lymphadenopathy.    Respiratory:  Lungs are clear to auscultation, Respirations are non-labored, Breath sounds are equal.    Cardiovascular:  Normal rate, Regular rhythm, No murmur.    Gastrointestinal:  Soft, Non-tender, Non-distended, Normal bowel sounds, No organomegaly.       No qualifying data available   Lymphatics:  No lymphadenopathy neck, axilla, groin.    Musculoskeletal:  Normal range of motion, Normal strength, No tenderness, Normal gait.    Integumentary:  Warm, Moist, No rash.    Neurologic:  Alert, Oriented, Normal sensory, Normal motor function, No focal deficits.    Psychiatric:  Cooperative, Appropriate mood & affect.       Health Maintenance      Health Maintenance     Pending (in the next year)        OverDue           Diabetes Maintenance-Fasting Lipid Profile due  09/29/18  and every 1  year(s)        Due            Alcohol Misuse Screening due  03/18/19  and every 1  year(s)           Aspirin Therapy for CVD Prevention due  03/18/19  and every 1  year(s)           Blood Pressure Screening due  03/18/19  and every            Colorectal Screening due  03/18/19  and every            Depression Screening due  03/18/19  and every            Diabetes Maintenance-Eye Exam due  03/18/19  and every            Diabetes Maintenance-Foot Exam  due  03/18/19  and every            Hypertension Management-Blood Pressure due  03/18/19  and every            Tetanus Vaccine due  03/18/19  and every 10  year(s)           Zoster Vaccine due  03/18/19  and every 100  year(s)        Due In Future            ADL Screening not due until  06/10/19  and every 1  year(s)           Obesity Screening not due until  06/10/19  and every 1  year(s)           Body Mass Index Check not due until  07/09/19  and every 1  year(s)           Diabetes Maintenance-HgbA1c not due until  09/21/19  and every 1  year(s)           Hypertension Management-BMP not due until  09/21/19  and every 1  year(s)     Satisfied (in the past 1 year)        Satisfied            ADL Screening on  06/10/18.  Satisfied by Dominique Nichole RN           Blood Pressure Screening on  07/08/18.  Satisfied by Chen Groves RN           Diabetes Maintenance-HgbA1c on  09/21/18.  Satisfied by Jacque Berg           Diabetes Screening on  09/21/18.  Satisfied by Jacque Berg           Hypertension Management-BMP on  09/21/18.  Satisfied by Jacque Berg           Obesity Screening on  06/10/18.  Satisfied by Dominique Nichole RN        Review / Management   Results review:     No qualifying data available.       Impression and Plan   plan for egd with dilation

## 2022-04-29 NOTE — OP NOTE
Patient:   Tashi Deluna            MRN: [id number]            FIN: [id number]               Age:   60 years     Sex:  Male     :  1960   Associated Diagnoses:   None   Author:   Kin Hubbard MD      Operative Note   Operative Information   Date/ Time:  2020 14:13:00.     Procedures Performed:   1.  Comprehensive EP study with attempted induction of arrhythmia  2.  Left atrial pacing and recording from coronary sinus catheter  3.  Pulmonary isolation with cryo balloon ablation  4.  3D mapping  5.  Intracardiac echo  6.  Transseptal puncture.     Indications:   60-year-old white male with paroxysmal atrial fibrillation on antiarrhythmic drug therapy who is undergoing attempted pulmonary vein isolation for definitive therapy.  .     Preoperative Diagnosis:   Paroxysmal atrial fibrillation  .     Postoperative Diagnosis:   Paroxysmal atrial fibrillation  .     Surgeon: Kin Hubbard MD.     Esimated blood loss: loss less than  10  cc.     Description of Procedure/Findings/    Complications:   Summary of procedure:  After risks, benefits, and alternatives were explained the patient, informed consent was obtained.  The patient is brought to the EP lab in a fasting and nonsedated state.  Sedation for the procedure was accomplished by the anesthesia team.  The bilateral groins were prepped and draped in usual sterile fashion.  Using 1% lidocaine the bilateral groins were anesthetized.  Using the modified Seldinger technique, access obtained to the right femoral vein on 2 separate occasions and the left femoral vein on 2 separate occasions.  A 7 [place]ian sheath and SLO sheath were placed into the right femoral vein.  A long 10 Estonian sheath and a short 6 Estonian sheath were placed into the left femoral vein.  I then advanced a [employer] decapolar CS catheter through the 7 [place]ian sheath and placed it into the coronary sinus for left atrial pacing and recording.  The intracardiac echo catheter and  deflectable quadripolar catheter were advanced into the right atrium.  The intracardiac echo catheter was used to visualize the interatrial septum.  The quadripolar catheter was placed in the His bundle region for His bundle recording.  Baseline measurements were obtained.    I then advanced a [place] transseptal needle through the SLO sheath and dilator and with the use of fluoroscopic and intracardiac echo guidance a single transseptal puncture was performed with the use of 2 radiofrequency impulses.  Successful puncture was confirmed on intracardiac echo.  The SLO sheath was advanced over the the needle and dilator into left atrium and the needle and dilator were removed in the body.  A left atrial mean pressure of 16 mmHg was measured.    Of note, after all venous access had been obtained, the patient received a 4000 unit heparin bolus.  After successful transseptal puncture the patient received another 4000 unit heparin bolus and was started on heparin drip.  Heparin was titrated throughout the procedure for goal ACT of 300 to 400 seconds.    A [place] pigtail wire was advanced through the SLO sheath into left atrium and the SLO sheath was exchanged for a short 14 [place] sheath which was used to dilate the groin tissue.  The 14 [place]zbek sheath was then exchanged for a flex cath sheath which was advanced over the pigtail wire into left atrium.  The wire and dilator were then removed from the body.  The cryo-balloon catheter and achieve wire were advanced through the flex cath sheath into left atrium and directed to the left superior pulmonary vein.  I performed a single lesion to the left superior pulmonary vein of 180 seconds with a justin temperature -57 °C.  There is isolation the vein at 46 seconds with a temperature -48 °C.  Isolation the vein was confirmed on voltage mapping.  The achieve wire and cryo-balloon catheter were then directed to the left inferior pulmonary vein were performed a single ablation  lesion of 180 seconds with a justin temperature -47 °C.  There was isolation the vein at 45 seconds with a temperature -40 °C.  Isolation again was confirmed with voltage mapping.  The achieve wire and cryo-balloon catheter were then directed to the right superior pulmonary vein where I performed a single ablation lesion of 180 seconds with a justin temperature -57 °C.  There was isolation the vein at 28 seconds with a temperature -38 °C.  Isolation was confirmed with voltage mapping.  The achieve wire and cryo-balloon catheter were then directed to the right inferior pulmonary vein where I performed a single ablation lesion of 180 seconds with a justin temperature -52 °C.  There was isolation the vein at 39 seconds with a temperature -39 °C.  Isolation was again confirmed with voltage mapping.    The achieve wire was then redirected to all 4 pulmonary veins and isolation was again confirmed.  The achieve wire and cryo-balloon catheter were then removed from the body and the flex cath sheath was withdrawn into the inferior vena cava.  The heparin drip was then discontinued.    Of note, prior to performing ablation to the right side pulmonary veins the quadripolar catheter was advanced to the junction of the superior vena cava and right subclavian vein where I performed high-output pacing of the phrenic nerve to evaluate for diaphragmatic contraction during ablation.  There was no loss of diaphragmatic contraction when ablating the right-sided pulmonary veins.    The deflectable quadripolar catheter was then repositioned in the His bundle region and post ablation measurements were obtained.  I then performed the post ablation atrial study with burst pacing, decremental pacing, and atrial extrastimuli.    The intracardiac echo catheter was then advanced into the right ventricle to visualize for pericardial effusion.  There was no significant pericardial fusion found.  The catheters were then removed for the body and the  sheaths were flushed.  The flex cath sheath was exchanged for a short 14 [place] sheath.  The patient was then given 30 mg of IV protamine for reversal heparinization.  The patient be transferred to the post anesthesia care unit where the sheaths were removed and hemostasis will be obtained with manual compression once the ACT is less than 180 seconds.    3D mapping was used throughout the procedure to create anatomy and voltage mapping of the left atrium, pulmonary veins, and left atrial appendage.    Baseline measurements:  Sinus node: The sinus cycle length was 1276 ms  AV node: The NE interval is 172 ms, the AH was 72 ms, the HV was 51 ms  Ventricle: The QRS duration was 78 ms and the QT was 487 ms    Post ablation study:  Sinus node: The sinus likely was 1053 ms  AV node: The AH interval 74 ms and the HV interval was 60 ms  Ventricle: The QRS duration was 86 ms and the QT was 475 ms    Atrial study: The AV block cycle length was found at 380 ms.  The fast pathway ERP was found at 320 ms after drive train of 600 ms.  The atrial ERP was found at 260 ms of a drive train of 600 ms.    Impression:  1.  Paroxysmal atrial fibrillation  2.  Successful pulmonary vein isolation with cryo balloon ablation  3.  Dual AV kevyn physiology    Plan:  We will monitor patient overnight for complications from the procedure.  He will receive half dose Lovenox 8 hours after hemostasis is obtained.  We will restart full anticoagulation therapy tomorrow morning.  We will continue with current medical therapy.  .     Complications: None.

## 2022-05-02 NOTE — HISTORICAL [facility]
This is a historical note converted from Laura. Formatting and pictures may have been removed.  Please reference Laura for original formatting and attached multimedia. Chief Complaint  Food impaction  History of Present Illness  This is a 60-year-old male?who is being evaluated today for?a suspected acute esophageal food impaction associated with dysphagia and odynophagia. ?He has a history of esophageal strictures?requiring multiple dilations in the past by Dr. Mckeon.? His last esophageal dilation was over 6 months ago.? Patient takes Eliquis. ?His last dose of Eliquis was?yesterday at noon.  Review of Systems  Constitutional:?no weight gain,?no weight loss,?no fatigue,?no fever,?no chills,?no weakness,?no trouble sleeping.  Cardiovascular:?no chest pain or discomfort,?no tightness,?no palpitations,?no SOB with activity,?no difficulty breathing while supine,?no swelling,?no sudden awakening from sleep with SOB.  Respiratory:??no cough,?no sputum,?no coughing up blood,?no SOB,?no wheezing,?no painful breathing.  ?   ?  ?  ?  ?  ?  ?  ?  ?  ?  ?  ?  ?  ?  ?  Physical Exam  Vitals & Measurements  T:?36.8? ?C (Oral)? HR:?89(Peripheral)? HR:?89(Monitored)? RR:?18? BP:?117/88? SpO2:?96%?  General:?well-developed well-nourished in no acute distress  Respiratory:?clear to auscultation bilaterally  Cardiovascular:?regular rate and rhythm without murmurs, gallops or rubs  Gastrointestinal:?soft, non-tender, non-distended with normal bowel sounds, without masses to palpation  Assessment/Plan  1.? Esophageal food impaction - EGD today.   Problem List/Past Medical History  Ongoing  Acid reflux  AF - Atrial fibrillation  Diabetes mellitus  Necrosis of pancreas  PAF - Paroxysmal atrial fibrillation  PUD - Peptic ulcer disease  Historical  dm  Esophageal stricture  HTN - Hypertension  Procedure/Surgical History  Arterial catheterization or cannulation for sampling, monitoring or transfusion (separate procedure); percutaneous  (06/23/2020)  Comprehensive electrophysiologic evaluation including transseptal catheterizations, insertion and repositioning of multiple electrode catheters with induction or attempted induction of an arrhythmia including left or right atrial pacing/recording when nec (06/23/2020)  Destruction of Conduction Mechanism, Percutaneous Approach (06/23/2020)  Echocardiography, transesophageal, real-time with image documentation (2D) (with or without M-mode recording); including probe placement, image acquisition, interpretation and report (06/23/2020)  Insertion of Monitoring Device into Upper Artery, Percutaneous Approach (06/23/2020)  Intracardiac echocardiography during therapeutic/diagnostic intervention, including imaging supervision and interpretation (List separately in addition to code for primary procedure) (06/23/2020)  Intracardiac electrophysiologic 3-dimensional mapping (List separately in addition to code for primary procedure) (06/23/2020)  Map Conduction Mechanism, Percutaneous Approach (06/23/2020)  Measurement of Cardiac Electrical Activity, Percutaneous Approach (06/23/2020)  Measurement of Cardiac Rhythm, Percutaneous Approach (06/23/2020)  Ultrasonography of Right and Left Heart (06/23/2020)  Ultrasonography of Right and Left Heart, Transesophageal (06/23/2020)  Dilation of Lower Esophagus, Via Natural or Artificial Opening Endoscopic (09/09/2019)  Esophagogastroduodenoscopy (09/09/2019)  Esophagogastroduodenoscopy, flexible, transoral; with insertion of guide wire followed by passage of dilator(s) through esophagus over guide wire (09/09/2019)  Savory Dilation (09/09/2019)  Cardioversion (.) (08/27/2019)  Restoration of Cardiac Rhythm, Single (08/27/2019)  Transesophageal Echo (for Surgery) (.) (08/27/2019)  Ultrasonography of Left Heart, Transesophageal (08/27/2019)  Dilation of Esophagus, Via Natural or Artificial Opening Endoscopic (08/05/2019)  Esophagogastroduodenoscopy  (08/05/2019)  Esophagogastroduodenoscopy, flexible, transoral; with insertion of guide wire followed by passage of dilator(s) through esophagus over guide wire (08/05/2019)  Savory Dilation (08/05/2019)  Biopsy Gastrointestinal (07/01/2019)  Dilation of Esophagogastric Junction, Via Natural or Artificial Opening Endoscopic (07/01/2019)  Esophagogastroduodenoscopy (07/01/2019)  Esophagogastroduodenoscopy, flexible, transoral; with biopsy, single or multiple (07/01/2019)  Esophagogastroduodenoscopy, flexible, transoral; with insertion of guide wire followed by passage of dilator(s) through esophagus over guide wire (07/01/2019)  Excision of Esophagogastric Junction, Via Natural or Artificial Opening Endoscopic, Diagnostic (07/01/2019)  Savory Dilation (07/01/2019)  Biopsy Gastrointestinal (03/18/2019)  Dilation of Esophagus, Via Natural or Artificial Opening Endoscopic (03/18/2019)  Esophagogastroduodenoscopy (03/18/2019)  Esophagogastroduodenoscopy, flexible, transoral; with biopsy, single or multiple (03/18/2019)  Esophagogastroduodenoscopy, flexible, transoral; with insertion of guide wire followed by passage of dilator(s) through esophagus over guide wire (03/18/2019)  Excision of Esophagus, Via Natural or Artificial Opening Endoscopic, Diagnostic (03/18/2019)  Savory Dilation (03/18/2019)  Drainage of Abdominal Wall, Open Approach, Diagnostic (06/11/2018)  Exploration Laparotomy (.) (06/11/2018)  Acute gastric ulcer with bleeding (10/01/2010)  Acute pancreatitis (10/01/2010)  Partial pancreatectomy (10/01/2010)  Appendectomy   Medications  Inpatient  IVF Normal Saline NS Bolus 1000ml 1,000 mL, 1000 mL, IV  Home  Carafate 1 g oral tablet, 1 gm= 1 tab(s), Oral, QIDACHS,? ?Not taking  Eliquis 5 mg oral tablet, 5 mg= 1 tab(s), Oral, BID, 11 refills  HumuLIN R 100 units/mL injectable solution, See Instructions  Lantus Solostar Pen 100 units/mL subcutaneous solution, 20 units, Subcutaneous, Once a day (at  bedtime)  Protonix 40 mg ORAL enteric coated tablet, 40 mg= 1 tab(s), Oral, Daily,? ?Not taking  sotalol 80 mg oral tablet, 80 mg= 1 tab(s), Oral, BID, 5 refills  Allergies  amoxicillin?(RASH)  Social History  Abuse/Neglect  No, 06/23/2020  No, No, Yes, 06/16/2020  No, 08/24/2019  No, 08/24/2019  No, No, Yes, 08/05/2019  No, 07/01/2019  Alcohol  Never, 08/24/2019  Never, 03/18/2019  Spiritual/Cultural  Evangelical, 06/16/2020  Tobacco  Former smoker, quit more than 30 days ago, N/A, 06/23/2020  Former smoker, quit more than 30 days ago, N/A, 06/16/2020  Former smoker, quit more than 30 days ago, N/A, 08/24/2019  Former smoker, quit more than 30 days ago, No, 08/24/2019  Former smoker, quit more than 30 days ago, N/A, 08/05/2019  Former smoker, quit more than 30 days ago, No, 07/01/2019  Former smoker, quit more than 30 days ago, N/A, 03/18/2019  Former smoker Use:., 06/10/2018

## 2022-05-19 ENCOUNTER — HOSPITAL ENCOUNTER (INPATIENT)
Facility: HOSPITAL | Age: 62
LOS: 1 days | Discharge: HOME | DRG: 309 | End: 2022-05-21
Attending: EMERGENCY MEDICINE | Admitting: INTERNAL MEDICINE
Payer: MEDICARE

## 2022-05-19 DIAGNOSIS — M79.606 LEG PAIN: ICD-10-CM

## 2022-05-19 DIAGNOSIS — I50.20 SYSTOLIC HEART FAILURE: ICD-10-CM

## 2022-05-19 DIAGNOSIS — R07.9 CHEST PAIN: ICD-10-CM

## 2022-05-19 DIAGNOSIS — I48.91 ATRIAL FIBRILLATION WITH RAPID VENTRICULAR RESPONSE: Primary | ICD-10-CM

## 2022-05-19 DIAGNOSIS — I95.9 HYPOTENSION, UNSPECIFIED HYPOTENSION TYPE: ICD-10-CM

## 2022-05-19 DIAGNOSIS — I50.9 CHRONIC CONGESTIVE HEART FAILURE, UNSPECIFIED HEART FAILURE TYPE: ICD-10-CM

## 2022-05-19 LAB
ALBUMIN SERPL-MCNC: 2.9 GM/DL (ref 3.4–4.8)
ALBUMIN/GLOB SERPL: 1.4 RATIO (ref 1.1–2)
ALP SERPL-CCNC: 64 UNIT/L (ref 40–150)
ALT SERPL-CCNC: 10 UNIT/L (ref 0–55)
AST SERPL-CCNC: 13 UNIT/L (ref 5–34)
BASOPHILS # BLD AUTO: 0.03 X10(3)/MCL (ref 0–0.2)
BASOPHILS NFR BLD AUTO: 0.6 %
BILIRUBIN DIRECT+TOT PNL SERPL-MCNC: 4.1 MG/DL
BNP BLD-MCNC: 420.7 PG/ML
BUN SERPL-MCNC: 14.2 MG/DL (ref 8.4–25.7)
CALCIUM SERPL-MCNC: 8 MG/DL (ref 8.8–10)
CHLORIDE SERPL-SCNC: 102 MMOL/L (ref 98–107)
CO2 SERPL-SCNC: 22 MMOL/L (ref 23–31)
CREAT SERPL-MCNC: 1.1 MG/DL (ref 0.73–1.18)
EOSINOPHIL # BLD AUTO: 0.01 X10(3)/MCL (ref 0–0.9)
EOSINOPHIL NFR BLD AUTO: 0.2 %
ERYTHROCYTE [DISTWIDTH] IN BLOOD BY AUTOMATED COUNT: 14.5 % (ref 11.5–17)
GLOBULIN SER-MCNC: 2.1 GM/DL (ref 2.4–3.5)
GLUCOSE SERPL-MCNC: 366 MG/DL (ref 82–115)
HCT VFR BLD AUTO: 31.1 % (ref 42–52)
HGB BLD-MCNC: 10.5 GM/DL (ref 14–18)
IMM GRANULOCYTES # BLD AUTO: 0.02 X10(3)/MCL (ref 0–0.02)
IMM GRANULOCYTES NFR BLD AUTO: 0.4 % (ref 0–0.43)
LYMPHOCYTES # BLD AUTO: 0.76 X10(3)/MCL (ref 0.6–4.6)
LYMPHOCYTES NFR BLD AUTO: 14 %
MCH RBC QN AUTO: 30.3 PG (ref 27–31)
MCHC RBC AUTO-ENTMCNC: 33.8 MG/DL (ref 33–36)
MCV RBC AUTO: 89.6 FL (ref 80–94)
MONOCYTES # BLD AUTO: 0.38 X10(3)/MCL (ref 0.1–1.3)
MONOCYTES NFR BLD AUTO: 7 %
NEUTROPHILS # BLD AUTO: 4.2 X10(3)/MCL (ref 2.1–9.2)
NEUTROPHILS NFR BLD AUTO: 77.8 %
NRBC BLD AUTO-RTO: 0 %
PLATELET # BLD AUTO: 125 X10(3)/MCL (ref 130–400)
PMV BLD AUTO: 11.1 FL (ref 9.4–12.4)
POTASSIUM SERPL-SCNC: 4.8 MMOL/L (ref 3.5–5.1)
PROT SERPL-MCNC: 5 GM/DL (ref 5.8–7.6)
RBC # BLD AUTO: 3.47 X10(6)/MCL (ref 4.7–6.1)
SARS-COV-2 RDRP RESP QL NAA+PROBE: NEGATIVE
SODIUM SERPL-SCNC: 135 MMOL/L (ref 136–145)
TROPONIN I SERPL-MCNC: <0.01 NG/ML (ref 0–0.04)
TROPONIN I SERPL-MCNC: <0.01 NG/ML (ref 0–0.04)
WBC # SPEC AUTO: 5.4 X10(3)/MCL (ref 4.5–11.5)

## 2022-05-19 PROCEDURE — 99292 CRITICAL CARE ADDL 30 MIN: CPT | Mod: 25

## 2022-05-19 PROCEDURE — 80053 COMPREHEN METABOLIC PANEL: CPT | Performed by: EMERGENCY MEDICINE

## 2022-05-19 PROCEDURE — 99291 CRITICAL CARE FIRST HOUR: CPT | Mod: 25

## 2022-05-19 PROCEDURE — 87635 SARS-COV-2 COVID-19 AMP PRB: CPT | Performed by: EMERGENCY MEDICINE

## 2022-05-19 PROCEDURE — 93005 ELECTROCARDIOGRAM TRACING: CPT

## 2022-05-19 PROCEDURE — 96376 TX/PRO/DX INJ SAME DRUG ADON: CPT

## 2022-05-19 PROCEDURE — G0378 HOSPITAL OBSERVATION PER HR: HCPCS

## 2022-05-19 PROCEDURE — 63600175 PHARM REV CODE 636 W HCPCS

## 2022-05-19 PROCEDURE — 25000003 PHARM REV CODE 250: Performed by: EMERGENCY MEDICINE

## 2022-05-19 PROCEDURE — 83036 HEMOGLOBIN GLYCOSYLATED A1C: CPT | Performed by: PHYSICIAN ASSISTANT

## 2022-05-19 PROCEDURE — 85025 COMPLETE CBC W/AUTO DIFF WBC: CPT | Performed by: EMERGENCY MEDICINE

## 2022-05-19 PROCEDURE — 96365 THER/PROPH/DIAG IV INF INIT: CPT

## 2022-05-19 PROCEDURE — 93010 EKG 12-LEAD: ICD-10-PCS | Mod: ,,, | Performed by: INTERNAL MEDICINE

## 2022-05-19 PROCEDURE — 36415 COLL VENOUS BLD VENIPUNCTURE: CPT | Performed by: EMERGENCY MEDICINE

## 2022-05-19 PROCEDURE — 93010 ELECTROCARDIOGRAM REPORT: CPT | Mod: ,,, | Performed by: INTERNAL MEDICINE

## 2022-05-19 PROCEDURE — 84484 ASSAY OF TROPONIN QUANT: CPT | Performed by: EMERGENCY MEDICINE

## 2022-05-19 PROCEDURE — 83880 ASSAY OF NATRIURETIC PEPTIDE: CPT | Performed by: EMERGENCY MEDICINE

## 2022-05-19 RX ORDER — SODIUM CHLORIDE 0.9 % (FLUSH) 0.9 %
2 SYRINGE (ML) INJECTION EVERY 6 HOURS
Status: DISCONTINUED | OUTPATIENT
Start: 2022-05-20 | End: 2022-05-20

## 2022-05-19 RX ORDER — AMIODARONE HYDROCHLORIDE 150 MG/3ML
150 INJECTION, SOLUTION INTRAVENOUS
Status: COMPLETED | OUTPATIENT
Start: 2022-05-19 | End: 2022-05-19

## 2022-05-19 RX ORDER — ONDANSETRON 2 MG/ML
4 INJECTION INTRAMUSCULAR; INTRAVENOUS EVERY 8 HOURS PRN
Status: DISCONTINUED | OUTPATIENT
Start: 2022-05-19 | End: 2022-05-21 | Stop reason: HOSPADM

## 2022-05-19 RX ORDER — SODIUM CHLORIDE 9 MG/ML
1000 INJECTION, SOLUTION INTRAVENOUS
Status: COMPLETED | OUTPATIENT
Start: 2022-05-19 | End: 2022-05-19

## 2022-05-19 RX ORDER — APIXABAN 5 MG/1
5 TABLET, FILM COATED ORAL 2 TIMES DAILY
COMMUNITY
Start: 2022-03-30

## 2022-05-19 RX ORDER — ROSUVASTATIN CALCIUM 10 MG/1
10 TABLET, COATED ORAL DAILY
COMMUNITY
Start: 2022-04-08

## 2022-05-19 RX ORDER — LANOLIN ALCOHOL/MO/W.PET/CERES
400 CREAM (GRAM) TOPICAL 2 TIMES DAILY
COMMUNITY
End: 2023-12-05

## 2022-05-19 RX ORDER — CETIRIZINE HYDROCHLORIDE 10 MG/1
10 TABLET, CHEWABLE ORAL DAILY
COMMUNITY
End: 2023-12-05

## 2022-05-19 RX ORDER — AMIODARONE HYDROCHLORIDE 150 MG/3ML
INJECTION, SOLUTION INTRAVENOUS
Status: COMPLETED
Start: 2022-05-19 | End: 2022-05-19

## 2022-05-19 RX ORDER — SACUBITRIL AND VALSARTAN 24; 26 MG/1; MG/1
1 TABLET, FILM COATED ORAL 2 TIMES DAILY
COMMUNITY
Start: 2022-04-28

## 2022-05-19 RX ORDER — SODIUM CHLORIDE 9 MG/ML
100 INJECTION, SOLUTION INTRAVENOUS CONTINUOUS
Status: DISCONTINUED | OUTPATIENT
Start: 2022-05-19 | End: 2022-05-21

## 2022-05-19 RX ORDER — ACETAMINOPHEN 325 MG/1
650 TABLET ORAL EVERY 8 HOURS PRN
Status: DISCONTINUED | OUTPATIENT
Start: 2022-05-19 | End: 2022-05-21 | Stop reason: HOSPADM

## 2022-05-19 RX ORDER — HYDROCODONE BITARTRATE AND ACETAMINOPHEN 5; 325 MG/1; MG/1
1 TABLET ORAL EVERY 4 HOURS PRN
Status: DISCONTINUED | OUTPATIENT
Start: 2022-05-19 | End: 2022-05-21 | Stop reason: HOSPADM

## 2022-05-19 RX ORDER — METOPROLOL SUCCINATE 100 MG/1
100 TABLET, EXTENDED RELEASE ORAL DAILY
COMMUNITY
Start: 2022-04-08

## 2022-05-19 RX ORDER — FUROSEMIDE 40 MG/1
40 TABLET ORAL DAILY
COMMUNITY
Start: 2022-04-26 | End: 2023-12-05

## 2022-05-19 RX ORDER — DIGOXIN 0.25 MG/ML
250 INJECTION INTRAMUSCULAR; INTRAVENOUS
Status: ACTIVE | OUTPATIENT
Start: 2022-05-19 | End: 2022-05-20

## 2022-05-19 RX ORDER — INSULIN GLARGINE 100 [IU]/ML
10 INJECTION, SOLUTION SUBCUTANEOUS DAILY
COMMUNITY
Start: 2022-05-05

## 2022-05-19 RX ORDER — SPIRONOLACTONE 25 MG/1
12.5 TABLET ORAL DAILY
COMMUNITY
Start: 2022-03-25

## 2022-05-19 RX ORDER — PANTOPRAZOLE SODIUM 40 MG/1
40 TABLET, DELAYED RELEASE ORAL DAILY
COMMUNITY
Start: 2022-04-26

## 2022-05-19 RX ADMIN — AMIODARONE HYDROCHLORIDE 150 MG: 50 INJECTION, SOLUTION INTRAVENOUS at 06:05

## 2022-05-19 RX ADMIN — AMIODARONE HYDROCHLORIDE 1 MG/MIN: 1.8 INJECTION, SOLUTION INTRAVENOUS at 06:05

## 2022-05-19 RX ADMIN — SODIUM CHLORIDE 1000 ML: 9 INJECTION, SOLUTION INTRAVENOUS at 06:05

## 2022-05-19 RX ADMIN — AMIODARONE HYDROCHLORIDE 150 MG: 150 INJECTION, SOLUTION INTRAVENOUS at 06:05

## 2022-05-19 NOTE — PROGRESS NOTES
To ER due to weakness palpitations  EKG afib RVR  SBP in the 70's     Awake alert    LHC 9/21 60% distal LAD, EF 25%  Echo 3/22 EF 30% VSD closure     On Eliquis  Agreee woth cordarone bolus and gtt ( on Eliquis )   IVF  He is awake and alert not need for cardioversion yet  Awaiting labs  Dig 0.25 if needed

## 2022-05-20 PROBLEM — I95.9 HYPOTENSION: Status: ACTIVE | Noted: 2022-05-20

## 2022-05-20 PROBLEM — I50.9 CHRONIC CONGESTIVE HEART FAILURE: Status: ACTIVE | Noted: 2022-05-20

## 2022-05-20 PROBLEM — M79.606 LEG PAIN: Status: ACTIVE | Noted: 2022-05-20

## 2022-05-20 PROBLEM — I48.91 ATRIAL FIBRILLATION WITH RAPID VENTRICULAR RESPONSE: Status: ACTIVE | Noted: 2022-05-20

## 2022-05-20 LAB
AV INDEX (PROSTH): 0.88
AV MEAN GRADIENT: 2 MMHG
AV PEAK GRADIENT: 4 MMHG
AV VELOCITY RATIO: 0.89
BSA FOR ECHO PROCEDURE: 1.93 M2
CV ECHO LV RWT: 0.63 CM
DOP CALC AO PEAK VEL: 1 M/S
DOP CALC AO VTI: 17.7 CM
DOP CALC LVOT PEAK VEL: 0.89 M/S
DOP CALCLVOT PEAK VEL VTI: 15.6 CM
E WAVE DECELERATION TIME: 172 MSEC
E/A RATIO: 1.62
E/E' RATIO: 8 M/S
ECHO LV POSTERIOR WALL: 1.5 CM (ref 0.6–1.1)
EJECTION FRACTION: 48 %
EST. AVERAGE GLUCOSE BLD GHB EST-MCNC: 246 MG/DL
FRACTIONAL SHORTENING: 25 % (ref 28–44)
HBA1C MFR BLD: 10.2 %
INTERVENTRICULAR SEPTUM: 1.31 CM (ref 0.6–1.1)
LEFT ATRIUM SIZE: 4.9 CM
LEFT ATRIUM VOLUME INDEX MOD: 53.1 ML/M2
LEFT ATRIUM VOLUME MOD: 102 CM3
LEFT INTERNAL DIMENSION IN SYSTOLE: 3.62 CM (ref 2.1–4)
LEFT VENTRICLE DIASTOLIC VOLUME INDEX: 56.25 ML/M2
LEFT VENTRICLE DIASTOLIC VOLUME: 108 ML
LEFT VENTRICLE MASS INDEX: 143 G/M2
LEFT VENTRICLE SYSTOLIC VOLUME INDEX: 28.8 ML/M2
LEFT VENTRICLE SYSTOLIC VOLUME: 55.2 ML
LEFT VENTRICULAR INTERNAL DIMENSION IN DIASTOLE: 4.8 CM (ref 3.5–6)
LEFT VENTRICULAR MASS: 275.26 G
LV LATERAL E/E' RATIO: 6.8 M/S
LV SEPTAL E/E' RATIO: 9.71 M/S
LVOT MG: 2 MMHG
LVOT MV: 0.59 CM/S
MV PEAK A VEL: 0.42 M/S
MV PEAK E VEL: 0.68 M/S
PISA TR MAX VEL: 1.88 M/S
POCT GLUCOSE: 217 MG/DL (ref 70–110)
POCT GLUCOSE: 260 MG/DL (ref 70–110)
POCT GLUCOSE: 347 MG/DL (ref 70–110)
PV PEAK VELOCITY: 0.79 CM/S
RA WIDTH: 4.5 CM
RIGHT VENTRICULAR END-DIASTOLIC DIMENSION: 4.3 CM
TDI LATERAL: 0.1 M/S
TDI SEPTAL: 0.07 M/S
TDI: 0.09 M/S
TR MAX PG: 14 MMHG
TRICUSPID ANNULAR PLANE SYSTOLIC EXCURSION: 2.39 CM

## 2022-05-20 PROCEDURE — 63600175 PHARM REV CODE 636 W HCPCS: Performed by: INTERNAL MEDICINE

## 2022-05-20 PROCEDURE — 25000003 PHARM REV CODE 250: Performed by: EMERGENCY MEDICINE

## 2022-05-20 PROCEDURE — 96372 THER/PROPH/DIAG INJ SC/IM: CPT | Performed by: INTERNAL MEDICINE

## 2022-05-20 PROCEDURE — 25000003 PHARM REV CODE 250: Performed by: NURSE PRACTITIONER

## 2022-05-20 PROCEDURE — 63600175 PHARM REV CODE 636 W HCPCS: Performed by: EMERGENCY MEDICINE

## 2022-05-20 PROCEDURE — 11000001 HC ACUTE MED/SURG PRIVATE ROOM

## 2022-05-20 RX ORDER — METOPROLOL SUCCINATE 25 MG/1
25 TABLET, EXTENDED RELEASE ORAL DAILY
Status: DISCONTINUED | OUTPATIENT
Start: 2022-05-20 | End: 2022-05-21

## 2022-05-20 RX ORDER — INSULIN ASPART 100 [IU]/ML
1-10 INJECTION, SOLUTION INTRAVENOUS; SUBCUTANEOUS
Status: DISCONTINUED | OUTPATIENT
Start: 2022-05-20 | End: 2022-05-21 | Stop reason: HOSPADM

## 2022-05-20 RX ORDER — GLUCAGON 1 MG
1 KIT INJECTION
Status: DISCONTINUED | OUTPATIENT
Start: 2022-05-20 | End: 2022-05-21 | Stop reason: HOSPADM

## 2022-05-20 RX ORDER — LANOLIN ALCOHOL/MO/W.PET/CERES
400 CREAM (GRAM) TOPICAL DAILY
Status: DISCONTINUED | OUTPATIENT
Start: 2022-05-20 | End: 2022-05-21 | Stop reason: HOSPADM

## 2022-05-20 RX ORDER — IBUPROFEN 200 MG
24 TABLET ORAL
Status: DISCONTINUED | OUTPATIENT
Start: 2022-05-20 | End: 2022-05-21 | Stop reason: HOSPADM

## 2022-05-20 RX ORDER — IBUPROFEN 200 MG
16 TABLET ORAL
Status: DISCONTINUED | OUTPATIENT
Start: 2022-05-20 | End: 2022-05-21 | Stop reason: HOSPADM

## 2022-05-20 RX ORDER — PANTOPRAZOLE SODIUM 40 MG/1
40 TABLET, DELAYED RELEASE ORAL DAILY
Status: DISCONTINUED | OUTPATIENT
Start: 2022-05-20 | End: 2022-05-21 | Stop reason: HOSPADM

## 2022-05-20 RX ORDER — ATORVASTATIN CALCIUM 10 MG/1
20 TABLET, FILM COATED ORAL DAILY
Status: DISCONTINUED | OUTPATIENT
Start: 2022-05-20 | End: 2022-05-21 | Stop reason: HOSPADM

## 2022-05-20 RX ADMIN — INSULIN ASPART 4 UNITS: 100 INJECTION, SOLUTION INTRAVENOUS; SUBCUTANEOUS at 03:05

## 2022-05-20 RX ADMIN — INSULIN ASPART 4 UNITS: 100 INJECTION, SOLUTION INTRAVENOUS; SUBCUTANEOUS at 02:05

## 2022-05-20 RX ADMIN — INSULIN ASPART 3 UNITS: 100 INJECTION, SOLUTION INTRAVENOUS; SUBCUTANEOUS at 11:05

## 2022-05-20 RX ADMIN — HYDROCODONE BITARTRATE AND ACETAMINOPHEN 1 TABLET: 5; 325 TABLET ORAL at 03:05

## 2022-05-20 RX ADMIN — METOPROLOL SUCCINATE 25 MG: 25 TABLET, EXTENDED RELEASE ORAL at 11:05

## 2022-05-20 RX ADMIN — ATORVASTATIN CALCIUM 20 MG: 10 TABLET, FILM COATED ORAL at 05:05

## 2022-05-20 RX ADMIN — APIXABAN 5 MG: 5 TABLET, FILM COATED ORAL at 11:05

## 2022-05-20 RX ADMIN — HYDROCODONE BITARTRATE AND ACETAMINOPHEN 1 TABLET: 5; 325 TABLET ORAL at 11:05

## 2022-05-20 RX ADMIN — PANTOPRAZOLE SODIUM 40 MG: 40 TABLET, DELAYED RELEASE ORAL at 11:05

## 2022-05-20 RX ADMIN — AMIODARONE HYDROCHLORIDE 0.5 MG/MIN: 1.8 INJECTION, SOLUTION INTRAVENOUS at 12:05

## 2022-05-20 RX ADMIN — Medication 400 MG: at 11:05

## 2022-05-20 RX ADMIN — AMIODARONE HYDROCHLORIDE 0.5 MG/MIN: 1.8 INJECTION, SOLUTION INTRAVENOUS at 11:05

## 2022-05-20 NOTE — ED PROVIDER NOTES
Encounter Date: 5/19/2022       History     Chief Complaint   Patient presents with    Weakness     Alvarez dems for weakness, dizziness; afib rvr per ems in the 160s, bp in the 90s; received 800ml NS, 4mg zofran, 5mg metoprolol with ems     Patient presents complaining of weakness dizziness.  EMS reports atrial fibrillation rapid ventricular response.  One hundred sixty with a blood pressure in the 90s.  He got metoprolol and Zofran in route.  He is doing slightly better upon arrival here denies a past history        Review of patient's allergies indicates:   Allergen Reactions    Amoxicillin Rash     History reviewed. No pertinent past medical history.  History reviewed. No pertinent surgical history.  History reviewed. No pertinent family history.     Review of Systems   Constitutional: Negative for appetite change and unexpected weight change.   HENT: Positive for dental problem, hearing loss and sinus pain.    Eyes: Negative for discharge and visual disturbance.   Respiratory: Negative for cough, chest tightness and shortness of breath.    Cardiovascular: Negative for palpitations and leg swelling.        Palpitations and irregular heartbeat   Gastrointestinal: Negative for abdominal pain, anal bleeding, blood in stool, nausea, rectal pain and vomiting.   Endocrine: Negative for polydipsia, polyphagia and polyuria.   Genitourinary: Negative for dysuria.   Musculoskeletal: Negative for back pain, myalgias and neck pain.   Skin: Positive for color change, pallor and rash. Negative for wound.   Allergic/Immunologic: Negative for environmental allergies, food allergies and immunocompromised state.   Neurological: Positive for dizziness, weakness and light-headedness. Negative for tremors, speech difficulty and numbness.   Hematological: Negative.  Negative for adenopathy. Does not bruise/bleed easily.   Psychiatric/Behavioral: Negative for behavioral problems, confusion and suicidal ideas. The patient is not  hyperactive.    All other systems reviewed and are negative.      Physical Exam     Initial Vitals   BP Pulse Resp Temp SpO2   05/19/22 1813 05/19/22 1813 05/19/22 1813 05/19/22 2100 05/19/22 1813   106/71 (!) 150 18 98.2 °F (36.8 °C) 95 %      MAP       --                Physical Exam    Nursing note and vitals reviewed.  Constitutional: He appears well-developed and well-nourished.   HENT:   Head: Normocephalic and atraumatic.   Right Ear: Tympanic membrane and external ear normal.   Left Ear: Tympanic membrane and external ear normal.   Nose: Nose normal.   Mouth/Throat: Oropharynx is clear and moist and mucous membranes are normal. Oral lesions: moist muc memb.   Eyes: Conjunctivae and EOM are normal. Pupils are equal, round, and reactive to light.   Neck: Neck supple. No thyromegaly present. No tracheal deviation present. No JVD present.   Normal range of motion.  Cardiovascular: Normal heart sounds and intact distal pulses. Exam reveals no gallop and no friction rub.    No murmur heard.  Irregularly irregular heart rate tachycardic.  Blood pressure low.   Pulmonary/Chest: Breath sounds normal. No stridor. No respiratory distress. He has no wheezes. He has no rhonchi. He has no rales. He exhibits no tenderness.   Abdominal: Abdomen is soft. Bowel sounds are normal. He exhibits no distension and no mass. No signs of injury. There is no abdominal tenderness.   Musculoskeletal:         General: No tenderness or edema. Normal range of motion.      Cervical back: Normal range of motion and neck supple.     Lymphadenopathy:     He has no cervical adenopathy.   Neurological: He is alert and oriented to person, place, and time. He has normal strength. No cranial nerve deficit or sensory deficit. GCS score is 15. GCS eye subscore is 4. GCS verbal subscore is 5. GCS motor subscore is 6.   Skin: Skin is warm and dry. Capillary refill takes 2 to 3 seconds. No rash noted.   Psychiatric: He has a normal mood and affect. His  behavior is normal. Judgment and thought content normal.         ED Course   Critical Care    Date/Time: 5/19/2022 8:43 PM  Performed by: Dereck Castro MD  Authorized by: Dereck Castro MD   Direct patient critical care time: 55 minutes  Additional history critical care time: 10 minutes  Ordering / reviewing critical care time: 10 minutes  Documentation critical care time: 10 minutes  Consulting other physicians critical care time: 12 minutes  Total critical care time (exclusive of procedural time) : 97 minutes  Critical care was necessary to treat or prevent imminent or life-threatening deterioration of the following conditions: circulatory failure.        Labs Reviewed   COMPREHENSIVE METABOLIC PANEL - Abnormal; Notable for the following components:       Result Value    Sodium Level 135 (*)     Carbon Dioxide 22 (*)     Glucose Level 366 (*)     Calcium Level Total 8.0 (*)     Protein Total 5.0 (*)     Albumin Level 2.9 (*)     Globulin 2.1 (*)     Bilirubin Total 4.1 (*)     All other components within normal limits   B-TYPE NATRIURETIC PEPTIDE - Abnormal; Notable for the following components:    Natriuretic Peptide 420.7 (*)     All other components within normal limits   CBC WITH DIFFERENTIAL - Abnormal; Notable for the following components:    RBC 3.47 (*)     Hgb 10.5 (*)     Hct 31.1 (*)     Platelet 125 (*)     IG# 0.02 (*)     All other components within normal limits   TROPONIN I - Normal   TROPONIN I - Normal   CBC W/ AUTO DIFFERENTIAL    Narrative:     The following orders were created for panel order CBC auto differential.  Procedure                               Abnormality         Status                     ---------                               -----------         ------                     CBC with Differential[[id number]]        Abnormal            Final result                 Please view results for these tests on the individual orders.     No results found for this or any previous visit  (from the past 5 hour(s)).    Vitals:    05/21/22 0000 05/21/22 0707 05/21/22 1139 05/21/22 1150   BP: 128/77 104/62  105/67   BP Location:       Patient Position:       Pulse: 92 (!) 51  80   Resp: 18 18 18 18   Temp: 98.6 °F (37 °C) 97.9 °F (36.6 °C)  97.9 °F (36.6 °C)   TempSrc:       SpO2: 100% 100%  99%   Weight:       Height:            ECG Results          EKG 12-lead (Final result)  Result time 05/19/22 21:55:26    Final result by Interface, [facility] (05/19/22 21:55:26)                 Narrative:    Test Reason : R07.9,    Vent. Rate : 153 BPM     Atrial Rate : 000 BPM     P-R Int : 000 ms          QRS Dur : 114 ms      QT Int : 252 ms       P-R-T Axes : 000 082 264 degrees     QTc Int : 402 ms    Atrial fibrillation with rapid ventricular response  Right bundle branch block  Marked ST abnormality, possible inferior subendocardial injury  Abnormal ECG  No previous ECGs available  Confirmed by Rod Mckeon MD ([id number]) on 5/19/2022 9:55:16 PM    Referred By:             Confirmed By:Rod Mckeon MD                             EKG 12-LEAD (Final result)  Result time 05/31/22 17:19:55    Final result by Unknown User (05/31/22 17:19:55)                                Imaging Results          X-Ray Chest AP Portable (Final result)  Result time 05/20/22 06:40:32    Final result by Hermelindo Russell MD (05/20/22 06:40:32)                 Impression:      No acute cardiopulmonary process.      Electronically signed by: Hermelindo Russell  Date:    05/20/2022  Time:    06:40             Narrative:    EXAMINATION:  XR CHEST AP PORTABLE    CLINICAL HISTORY:  Chest Pain;    TECHNIQUE:  Single view of the chest    COMPARISON:  04/12/2022    FINDINGS:  No focal opacification, pleural effusion, or pneumothorax.    The cardiomediastinal silhouette is within normal limits.  Left-sided cardiac device remains.    No acute osseous abnormality.                                 Medications   amiodarone 360 mg/200 mL (1.8 mg/mL)  infusion (0 mg/min Intravenous Stopped 5/20/22 0126)   digoxin injection 250 mcg (0 mcg Intravenous Hold 5/19/22 1900)   0.9%  NaCl infusion (0 mLs Intravenous Stopped 5/19/22 1856)   amiodarone injection 150 mg (150 mg Intravenous Given 5/19/22 1837)                 ED Course as of 06/04/22 1604   Thu May 19, 2022   1853 I spoke with Tres ARENAS nurse practitioner with [facility].  We are going to continue gentle hydration were going to use the amiodarone number going to add a single dose of Lanoxin to add  This point. [DM]   1956 1941 92/69 - - 104 18 100 %   Patient id condition is certainly improved his mental status is good he is awake and talking he is on the amiodarone drip and has received the digoxin 1 dose.  Consultations been obtained with Mr. Arenas nurse practitioner with [facility] we recall him at this time. [DM]   2041 Current blood pressure is 101 with a heart rate of 93 he is feeling much better he will be admitted he is concerned about the pain in his legs even though he is on Eliquis I will order a venous needed to be done in the morning.  Orders are written patient is going to be admitted to the cardiology service after my 2nd discussion with the mid-level on-call for [facility]. [DM]      ED Course User Index  [DM] Dereck Castro MD           Vitals:    05/21/22 0000 05/21/22 0707 05/21/22 1139 05/21/22 1150   BP: 128/77 104/62  105/67   BP Location:       Patient Position:       Pulse: 92 (!) 51  80   Resp: 18 18 18 18   Temp: 98.6 °F (37 °C) 97.9 °F (36.6 °C)  97.9 °F (36.6 °C)   TempSrc:       SpO2: 100% 100%  99%   Weight:       Height:         Clinical Impression:   Final diagnoses:  [R07.9] Chest pain  [I48.91] Atrial fibrillation with rapid ventricular response (Primary)  [I95.9] Hypotension, unspecified hypotension type  [I50.9] Chronic congestive heart failure, unspecified heart failure type  [M79.606] Leg pain          ED Disposition Condition    Observation               Dereck Castro MD  05/19/22  2045       Dereck Castro MD  06/04/22 5603

## 2022-05-20 NOTE — H&P
[facility] - 4th Floor Medical Telemetry  Cardiology  Consult Note    Patient Name: Tashi Deluna  MRN: [id number]  Admission Date: 5/19/2022  Hospital Length of Stay: 0 days  Code Status: Full Code   Attending Provider: Carlos Puga MD   Consulting Provider: MIO Kulkarni  Primary Care Physician: Primary Doctor No  Principal Problem:<principal problem not specified>    Patient information was obtained from patient and ER records.     Subjective:     Chief Complaint:      HPI:   Mr. Deluna is a 63y/o male, followed by Dr. Hubbard/Susana, with PMHx significant for PAF/ablation, NICMO-BIVAICD, IDDM, pancreatic necrosis, and VSD with closure who presented to ED via EMS for C/o shooting left hip pain radiating to left thigh with weakness and dizziness. He was found to be in Afib, 's. SBP 90's. He was bolused with 800 ml NS and  5mg of Lopressor enroute but was still in 150's upon arrival to ED. [facility] consulted and recommended additional IVF's, digoxin, and amiodarone bolus/drip with resolution of RVR. Patient has converted to SR. BP stable. Urine dark- noted total bili elevatd. Still c/o constant shooting left hip pain. Denies injury      Hospital Course:  PMH: VSD closure, nonischemic cardiomyopathy, PAF, DIDDM, duodenal ulcer with perforation, stricture of esophagus, peptic ulcer disease, necrosis of pancreas  PSH: EGD, LHC, ablation of A. fib, pancreatectomy, VSD closure (11/2021), BIVAICD implant 4/11/22  Family Hx: Father-cancer tumor on small intestine; mother-ALS  Social Hx: Former smoker    Previous Diagnostic Studies:  Echo (3-24-22)   Global LV systolic function is severely decreased.  LVEF is 30%.  Septum appears akinetic.  Noted LV hypertrophy.  It is moderate  Biatrial enlargement  History of VSD closure; no evidence of intraventricular shunting  Moderate MR  Mild to moderate TR  Aortic root is mildly dilated at 4.4 cm  Pulmonary artery systolic pressure is 35 mmHg  A small  pericardial effusion is noted.  A moderate pleural effusion is noted    [facility] (9.17.21)  Left main: No evidence of obstructive disease  LAD: Relatively short and becomes very small distally, not reaching the apex.  Close to the distal segment it shows 60% stenosis  Left circumflex: Large vessel no significant disease  Ramus: Large and prominent without significant disease  RCA is very large and dominant without significant disease          No current facility-administered medications on file prior to encounter.     Current Outpatient Medications on File Prior to Encounter   Medication Sig    cetirizine 10 mg chewable tablet Take 10 mg by mouth once daily.    ELIQUIS 5 mg Tab Take 5 mg by mouth 2 (two) times daily.    ENTRESTO 24-26 mg per tablet Take 1 tablet by mouth 2 (two) times daily.    furosemide (LASIX) 40 MG tablet Take 40 mg by mouth once daily.    LANTUS U-100 INSULIN 100 unit/mL injection Inject into the skin.    magnesium oxide (MAG-OX) 400 mg (241.3 mg magnesium) tablet Take 400 mg by mouth once daily.    metoprolol succinate (TOPROL-XL) 100 MG 24 hr tablet Take 100 mg by mouth.    pantoprazole (PROTONIX) 40 MG tablet Take 40 mg by mouth once daily.    rosuvastatin (CRESTOR) 10 MG tablet Take 10 mg by mouth.    spironolactone (ALDACTONE) 25 MG tablet Take 25 mg by mouth 2 (two) times daily.     Review of Systems:  Review of Systems   Respiratory: Negative.    Cardiovascular: Negative for chest pain and leg swelling.   Gastrointestinal: Negative for abdominal distention.   Genitourinary: Negative.    Musculoskeletal:        Constant Left hip pain radiating to thigh   Skin: Negative.    Neurological: Negative for dizziness, syncope and weakness.   Psychiatric/Behavioral: Negative.        Objective:     Vital Signs (Most Recent):  Temp: 98.1 °F (36.7 °C) (05/20/22 0335)  Pulse: 83 (05/20/22 0335)  Resp: 18 (05/20/22 0335)  BP: 118/77 (05/20/22 0335)  SpO2: 99 % (05/20/22 0335) Vital Signs (24h Range):  Temp:   [97.9 °F (36.6 °C)-98.2 °F (36.8 °C)] 98.1 °F (36.7 °C)  Pulse:  [] 83  Resp:  [0-18] 18  SpO2:  [95 %-100 %] 99 %  BP: ()/(37-80) 118/77     Weight: 77.3 kg (170 lb 6.7 oz)  Body mass index is 25.17 kg/m².    SpO2: 99 %  O2 Device (Oxygen Therapy): room air      Intake/Output Summary (Last 24 hours) at 5/20/2022 0820  Last data filed at 5/20/2022 0319  Gross per 24 hour   Intake 500 ml   Output 200 ml   Net 300 ml       Lines/Drains/Airways       Peripheral Intravenous Line  Duration                  Peripheral IV - Single Lumen 05/19/22 1825 18 G Left Antecubital <1 day         Peripheral IV - Single Lumen 05/19/22 1826 18 G Right Antecubital <1 day                      Significant Labs:  Recent Lab Results         05/20/22  0125   05/19/22 2049 05/19/22  2045 05/19/22  1851        Albumin/Globulin Ratio       1.4       Albumin       2.9       Alkaline Phosphatase       64       ALT       10       AST       13       Baso #       0.03       Basophil %       0.6       BILIRUBIN TOTAL       4.1       BNP       420.7       BUN       14.2       Calcium       8.0       Chloride       102       CO2       22       ID NOW COVID-19, (ANNA)     Negative         Creatinine       1.10       eGFR if non        >60       Eos #       0.01       Eosinophil %       0.2       Globulin, Total       2.1       Glucose       366       Hematocrit       31.1       Hemoglobin       10.5       Immature Grans (Abs)       0.02       Immature Granulocytes       0.4       Lymph #       0.76       LYMPH %       14.0       MCH       30.3       MCHC       33.8       MCV       89.6       Mono #       0.38       Mono %       7.0       MPV       11.1       Neut #       4.2       Neut %       77.8       nRBC       0.0       Platelets       125       POCT Glucose 347             Potassium       4.8       PROTEIN TOTAL       5.0       RBC       3.47       RDW       14.5       Sodium       135       Troponin I   <0.010      <0.010       WBC       5.4                 Significant Imaging:   Imaging Results              X-Ray Chest AP Portable (Final result)  Result time 05/20/22 06:40:32      Final result by Hermelindo Russell MD (05/20/22 06:40:32)                   Impression:      No acute cardiopulmonary process.      Electronically signed by: Hermelindo Russell  Date:    05/20/2022  Time:    06:40               Narrative:    EXAMINATION:  XR CHEST AP PORTABLE    CLINICAL HISTORY:  Chest Pain;    TECHNIQUE:  Single view of the chest    COMPARISON:  04/12/2022    FINDINGS:  No focal opacification, pleural effusion, or pneumothorax.    The cardiomediastinal silhouette is within normal limits.  Left-sided cardiac device remains.    No acute osseous abnormality.                                      Last Lipids:  Lab Results   Component Value Date    CHOL 160 03/19/2022    CHOL 156 03/14/2022    CHOL 151 09/17/2021     Lab Results   Component Value Date    HDL 45 03/19/2022    HDL 49 03/14/2022    HDL 46 09/17/2021     No results found for: LDLCALC  Lab Results   Component Value Date    TRIG 82 03/19/2022    TRIG 79 03/14/2022    TRIG 83 09/17/2021     No results found for: CHOLHDL    EKG:    Results for orders placed or performed during the hospital encounter of 05/19/22   EKG 12-lead    Narrative    Test Reason : R07.9,    Vent. Rate : 153 BPM     Atrial Rate : 000 BPM     P-R Int : 000 ms          QRS Dur : 114 ms      QT Int : 252 ms       P-R-T Axes : 000 082 264 degrees     QTc Int : 402 ms    Atrial fibrillation with rapid ventricular response  Right bundle branch block  Marked ST abnormality, possible inferior subendocardial injury  Abnormal ECG  No previous ECGs available  Confirmed by Rod Mckeon MD ([id number]) on 5/19/2022 9:55:16 PM    Referred By:             Confirmed By:Rod Mckeon MD       Telemetry:      Physical Exam:  Physical Exam  HENT:      Mouth/Throat:      Mouth: Mucous membranes are moist.   Cardiovascular:      Rate  and Rhythm: Regular rhythm.      Heart sounds: Normal heart sounds.   Pulmonary:      Breath sounds: Normal breath sounds.   Abdominal:      Palpations: Abdomen is soft.   Musculoskeletal:         General: Normal range of motion.   Skin:     General: Skin is warm and dry.   Neurological:      General: No focal deficit present.   Psychiatric:         Mood and Affect: Mood normal.         Home Medications:   No current facility-administered medications on file prior to encounter.     Current Outpatient Medications on File Prior to Encounter   Medication Sig Dispense Refill    cetirizine 10 mg chewable tablet Take 10 mg by mouth once daily.      ELIQUIS 5 mg Tab Take 5 mg by mouth 2 (two) times daily.      ENTRESTO 24-26 mg per tablet Take 1 tablet by mouth 2 (two) times daily.      furosemide (LASIX) 40 MG tablet Take 40 mg by mouth once daily.      LANTUS U-100 INSULIN 100 unit/mL injection Inject into the skin.      magnesium oxide (MAG-OX) 400 mg (241.3 mg magnesium) tablet Take 400 mg by mouth once daily.      metoprolol succinate (TOPROL-XL) 100 MG 24 hr tablet Take 100 mg by mouth.      pantoprazole (PROTONIX) 40 MG tablet Take 40 mg by mouth once daily.      rosuvastatin (CRESTOR) 10 MG tablet Take 10 mg by mouth.      spironolactone (ALDACTONE) 25 MG tablet Take 25 mg by mouth 2 (two) times daily.         Current Inpatient Medications:    Current Facility-Administered Medications:     0.9%  NaCl infusion, 100 mL/hr, Intravenous, Continuous, Dereck Castro MD, Last Rate: 100 mL/hr at 05/19/22 2145, 100 mL/hr at 05/19/22 2145    acetaminophen tablet 650 mg, 650 mg, Oral, Q8H PRN, Dereck Castro MD    amiodarone 360 mg/200 mL (1.8 mg/mL) infusion, 0.5 mg/min, Intravenous, Continuous, Dereck Castro MD, Last Rate: 16.7 mL/hr at 05/20/22 0045, 0.5 mg/min at 05/20/22 0045    apixaban tablet 5 mg, 5 mg, Oral, BID, Tres SHETTY. Tracy, NP    dextrose 10% bolus 125 mL, 12.5 g, Intravenous, PRN, Tres Q. Tracy,  NP    dextrose 10% bolus 250 mL, 25 g, Intravenous, PRN, Tres Q. Tracy, NP    dextrose 50% injection 12.5 g, 12.5 g, Intravenous, PRN, Tres Q. Tracy, NP    dextrose 50% injection 25 g, 25 g, Intravenous, PRN, Tres Q. Tracy, NP    glucagon (human recombinant) injection 1 mg, 1 mg, Intramuscular, PRN, Tres Q. Tracy, NP    glucose chewable tablet 16 g, 16 g, Oral, PRN, Tres Q. Tracy, NP    glucose chewable tablet 24 g, 24 g, Oral, PRN, Tres Q. Tracy, NP    HYDROcodone-acetaminophen 5-325 mg per tablet 1 tablet, 1 tablet, Oral, Q4H PRN, Dereck Castro MD, 1 tablet at 05/20/22 0331    insulin aspart U-100 injection 1-10 Units, 1-10 Units, Subcutaneous, QID (AC + HS) PRN, Carlos Puga MD, 4 Units at 05/20/22 0335    ondansetron injection 4 mg, 4 mg, Intravenous, Q8H PRN, Dereck Castro MD    sodium chloride 0.9% bolus 500 mL, 500 mL, Intravenous, Once, Dereck Castro MD    sodium chloride 0.9% flush 2 mL, 2 mL, Intravenous, Q6H, Dereck Castro MD         VTE Risk Mitigation (From admission, onward)           Ordered     apixaban tablet 5 mg  2 times daily         05/20/22 0156     IP VTE HIGH RISK PATIENT  Once         05/19/22 2041     Place sequential compression device  Until discontinued         05/19/22 2041                    Assessment:   IMPRESSION:  Afib, RVR  NICMO  --BiV AICD  Chronic SHF   --EF 30%  --  CAD, distal LAD 60%  IDDM  --Hyperglycemic      PLAN:   Patient has converted to SR on amiodarone drip.   Will continue protocol and convert to oral amio once completed. Continue eliquis. Obtain Mg level  Denies SOB. Appears compensated. Hold entresto for now.   Consult hospitalist to evaluated hip pain/ manage diabetes.     Thank you for your consult.     George Naranjo MD  Cardiology

## 2022-05-20 NOTE — CONSULTS
[facility]  Hospital Medicine Consultation Note        Patient Name: Tashi Deluna  MRN: [id number]  Patient Class: IP- Inpatient   Admission Date: 5/19/2022  6:16 PM  Length of Stay: 0  Attending Physician: Dr. Carlos Puga  Primary Care Provider: Primary Doctor No  Face-to-Face encounter date: 05/20/2022   Consulting Physician: Dr. Denny  Reason for Consult: right hip pain and DM management  Chief Complaint: weakness, dizziness, right hip pain      Patient information was obtained from patient, patient's family, past medical records.    HISTORY OF PRESENT ILLNESS:   Tashi Deluna is a 62 y.o. male with a history of PAF/ablation, NICMO-BIV AICD, Chronic systolic HF (last echo on 03/24/2022 LVEF 30%), IDDM, pancreatic necrosis, and VSD with closure was admitted to [facility] under Cariology services, Dr. Carlos Puga, on 5/19/2022 with the diagnosis of atrial fibrillation with RVR requiring digoxin and an amiodarone drip with successful conversion to NSR. He intitally presented to the ED with complaints of right hip/buttocks pain that radiated to his thigh described as shooting/sharp as well as weakness and dizziness. He states the pain began on 05/16/2022 and is exacerbated by certain movements, but he denies any trauma. He has been compliant with his diabetic medications at home including his Novolog (sliding scale) and Lantus 20 units at night. Hospital Medicine team was consulted for diabetes management and further evaluation of left hip pain.     His vital signs are currently stable.  His labs showed a glucose 366 on 05/19/2022, but he was started on an insulin sliding scale this morning on 05/20/2022 and the nurse reported a CBG in the 200s.   PAST MEDICAL HISTORY:   VSD closure, nonischemic cardiomyopathy (LVEF is 30% from echo done on 03/24/2022), PAF, DIDDM, duodenal ulcer with perforation, stricture of esophagus, peptic ulcer disease, necrosis of pancreas    PAST SURGICAL HISTORY:   EGD,  [facility], ablation of A. fib, pancreatectomy, VSD closure (11/2021), BIVAICD implant 4/11/22    ALLERGIES:   Amoxicillin    FAMILY HISTORY:   Reviewed and negative    SOCIAL HISTORY:     Social History     Tobacco Use    Smoking status: Not on file    Smokeless tobacco: Not on file   Substance Use Topics    Alcohol use: Not on file        HOME MEDICATIONS:     Prior to Admission medications    Medication Sig Start Date End Date Taking? Authorizing Provider   cetirizine 10 mg chewable tablet Take 10 mg by mouth once daily.   Yes Historical Provider   ELIQUIS 5 mg Tab Take 5 mg by mouth 2 (two) times daily. 3/30/22  Yes Historical Provider   ENTRESTO 24-26 mg per tablet Take 1 tablet by mouth 2 (two) times daily. 4/28/22  Yes Historical Provider   furosemide (LASIX) 40 MG tablet Take 40 mg by mouth once daily. 4/26/22  Yes Historical Provider   LANTUS U-100 INSULIN 100 unit/mL injection Inject into the skin. 5/5/22  Yes Historical Provider   magnesium oxide (MAG-OX) 400 mg (241.3 mg magnesium) tablet Take 400 mg by mouth once daily.   Yes Historical Provider   metoprolol succinate (TOPROL-XL) 100 MG 24 hr tablet Take 100 mg by mouth. 4/8/22  Yes Historical Provider   pantoprazole (PROTONIX) 40 MG tablet Take 40 mg by mouth once daily. 4/26/22  Yes Historical Provider   rosuvastatin (CRESTOR) 10 MG tablet Take 10 mg by mouth. 4/8/22  Yes Historical Provider   spironolactone (ALDACTONE) 25 MG tablet Take 25 mg by mouth 2 (two) times daily. 3/25/22  Yes Historical Provider       REVIEW OF SYSTEMS:   Except as documented, all other systems reviewed and negative     PHYSICAL EXAM:     VITAL SIGNS: 24 HRS MIN & MAX LAST   Temp  Min: 97.3 °F (36.3 °C)  Max: 98.2 °F (36.8 °C) 97.4 °F (36.3 °C)   BP  Min: 58/37  Max: 123/80 111/70   Pulse  Min: 81  Max: 150  84   Resp  Min: 0  Max: 18 18   SpO2  Min: 95 %  Max: 100 % 100 %       Vitals Reviewed  General: well-developed well-nourished in no acute distress  Eye: clear conjunctiva,  eyelids normal  HENT: oropharynx and nasal mucosal surfaces moist  Neck: full range of motion  Respiratory: clear to auscultation bilaterally anteriorly  Cardiovascular: regular rate and rhythm without gallops or rubs  Gastrointestinal: soft, non-tender, non-distended with normal bowel sounds, without masses to palpation  Musculoskeletal: full range of motion of all extremities, positive straight leg raise on the right lower extremity and negative on the left, limited flexion secondary to pain   Integumentary: warm, dry  Neurologic: cranial nerves intact      LABS AND IMAGING:     Recent Labs   Lab 05/19/22  1851   WBC 5.4   RBC 3.47*   HGB 10.5*   HCT 31.1*   MCV 89.6   MCH 30.3   MCHC 33.8   RDW 14.5   *   MPV 11.1       Recent Labs   Lab 05/19/22  1851   *   K 4.8   CO2 22*   BUN 14.2   CREATININE 1.10   CALCIUM 8.0*   ALBUMIN 2.9*   ALKPHOS 64   ALT 10   AST 13   BILITOT 4.1*        Microbiology Results (last 7 days)     ** No results found for the last 168 hours. **           X-Ray Chest AP Portable  Narrative: EXAMINATION:  XR CHEST AP PORTABLE    CLINICAL HISTORY:  Chest Pain;    TECHNIQUE:  Single view of the chest    COMPARISON:  04/12/2022    FINDINGS:  No focal opacification, pleural effusion, or pneumothorax.    The cardiomediastinal silhouette is within normal limits.  Left-sided cardiac device remains.    No acute osseous abnormality.  Impression: No acute cardiopulmonary process.    Electronically signed by: Hermelindo Russell  Date:    05/20/2022  Time:    06:40      ASSESSMENT & PLAN:   Hyperglycemia secondary to diabetes mellitus  Right hip pain possibly secondary to sciatica versus arthitis  Atrial fibrillation with RVR requiring amiodarone drip, converted to NSR  History of PAF/ablation  History of  NICMO-BIV AICD  History of Chronic systolic HF (last echo on 03/24/2022 LVEF 30%),    Plan:  -[facility]IS the primary so continue with their recommendations  -continued IV hydration  -continue with a  diabetic diet dysphagia pureed plan  -monitor CBG and insulin sliding scale with a goal of a glucose between 140-180, [id number]  -ultrasound bilateral lower extremities to rule out DVT-pending  -Will obtain x-ray right hip and if it is unremarkable then his pain is most likely sciatic so we can consider starting him on some gabapentin    VTE Prophylaxis: Already on Eliquis    __________________________________________________________________________  INPATIENT LIST OF MEDICATIONS     Current Facility-Administered Medications:     0.9%  NaCl infusion, 100 mL/hr, Intravenous, Continuous, Dereck Castro MD, Last Rate: 100 mL/hr at 05/19/22 2145, 100 mL/hr at 05/19/22 2145    acetaminophen tablet 650 mg, 650 mg, Oral, Q8H PRN, Dereck Castro MD    amiodarone 360 mg/200 mL (1.8 mg/mL) infusion, 0.5 mg/min, Intravenous, Continuous, Dereck Castro MD, Last Rate: 16.7 mL/hr at 05/20/22 1127, 0.5 mg/min at 05/20/22 1127    apixaban tablet 5 mg, 5 mg, Oral, BID, Tres Q. Tracy, NP, 5 mg at 05/20/22 1126    atorvastatin tablet 20 mg, 20 mg, Oral, Daily, Jeanette Whitley, MIO    dextrose 10% bolus 125 mL, 12.5 g, Intravenous, PRN, Tres Q. Tracy, NP    dextrose 10% bolus 250 mL, 25 g, Intravenous, PRN, Tres Q. Tracy, NP    dextrose 50% injection 12.5 g, 12.5 g, Intravenous, PRN, Tres Q. Tracy, NP    dextrose 50% injection 25 g, 25 g, Intravenous, PRN, Tres Q. Tracy, NP    glucagon (human recombinant) injection 1 mg, 1 mg, Intramuscular, PRN, Tres Q. Tracy, NP    glucose chewable tablet 16 g, 16 g, Oral, PRN, Tres Q. Tracy, NP    glucose chewable tablet 24 g, 24 g, Oral, PRN, Tres Q. Tracy, NP    HYDROcodone-acetaminophen 5-325 mg per tablet 1 tablet, 1 tablet, Oral, Q4H PRN, Dereck Castro MD, 1 tablet at 05/20/22 0331    insulin aspart U-100 injection 1-10 Units, 1-10 Units, Subcutaneous, QID (AC + HS) PRN, Carlos Puga MD, 4 Units at 05/20/22 0335    insulin detemir U-100  injection 16 Units, 16 Units, Subcutaneous, Daily, Jeanette VILLARREAL [place], FNP    magnesium oxide tablet 400 mg, 400 mg, Oral, Daily, Jeanette VILLARREAL [place], FNP, 400 mg at 05/20/22 1127    metoprolol succinate (TOPROL-XL) 24 hr tablet 25 mg, 25 mg, Oral, Daily, Jeanette VILLARREAL Gutierrez, FNP, 25 mg at 05/20/22 1127    ondansetron injection 4 mg, 4 mg, Intravenous, Q8H PRN, Dereck Castro MD    pantoprazole EC tablet 40 mg, 40 mg, Oral, Daily, Jeanette VILLARREAL Gutierrez, FNP, 40 mg at 05/20/22 1127    sodium chloride 0.9% bolus 500 mL, 500 mL, Intravenous, Once, Dereck Castro MD      Scheduled Meds:   apixaban  5 mg Oral BID    atorvastatin  20 mg Oral Daily    insulin detemir U-100  16 Units Subcutaneous Daily    magnesium oxide  400 mg Oral Daily    metoprolol succinate  25 mg Oral Daily    pantoprazole  40 mg Oral Daily    sodium chloride 0.9%  500 mL Intravenous Once     Continuous Infusions:   sodium chloride 0.9% 100 mL/hr (05/19/22 2145)    amiodarone in dextrose 5% 0.5 mg/min (05/20/22 1127)     PRN Meds:.acetaminophen, dextrose 10%, dextrose 10%, dextrose 50%, dextrose 50%, glucagon (human recombinant), glucose, glucose, HYDROcodone-acetaminophen, insulin aspart U-100, ondansetron        I, Darryn Guajardo , NP/PA have reviewed and discussed the case with Dr. Denny  Please see the following addendum for further assessment and plan from there attending MD.  05/20/2022    ______________________________________________________________________________    MD Addendum:  Late entry - attempted to see pt on 5/20/22 at around 3 pm but pt was getting imaging at radiology for right hip pain  I, Dr denny_ assumed care of this patient today at 8:47_  For the patient encounter, I performed the substantive portion of the visit, I reviewed the NPPA documentation, treatment plan, and medical decision making.  I had face to face time with this patient       A. History:Tashi Deluna is a 62 y.o. male with a history of PAF/ablation,  NICMO-BIV AICD, Chronic systolic HF (last echo on 2022 LVEF 30%), IDDM, pancreatic necrosis, and VSD with closure was admitted to [facility] under Cariology services, Dr. Carlos Puga, on 2022 with the diagnosis of atrial fibrillation with RVR requiring digoxin and an amiodarone drip with successful conversion to NSR.  Upon admission he was complaining of right hip pain with radiation to right buttocks as well as anterio thigh at time. This kind of pain is new for him, painful to the point that he is using a cane  For assistance.  Pt also with hx of DM2 on insulin w a A1c of 11 as per his recollection.   A right hip 3 view x rays was done showing mostly DJD      B. Physical exam:  A/a/ and o x 3  NAD/afebrile  s1s2  cta  Back- non tender posterior lumbar facets or paraspinal musculature. Pt is very tender on palpation of right middle buttocks, pain is reproducible exactly the same on palpation.    C. Medical decision makin-sciatica-  Pt appears to be related to new onset  Sciatica with on and off numbness, rad to anterior thigh. We can treat this conservative with acetaminophen/ short course of opioids, heating pads. Usually last 4-6 weeks but 90% of sciatica is associated with djd lumbar spine so if eventually pain not improved w conservative tx, pt will need mri L/S spine looking for disc herniations ..etc.     2-Concerning his diabetes, will  Resume home meds/monitor glycemia and get nutritionist to go over ADA education. With heart disease he will need DM controlled before discharge. Titrate insulin requrements and minimize oral hypoglycemics for better compliance. Could benefit from home health with chf/ cardiac monitoring. Will continue to follow along with you. Thanks for letting us participate in pt care      Thank you for the consult, will be happy to follow alongside you      All diagnosis and differential diagnosis have been reviewed; assessment and plan has been documented; I have personally  reviewed the labs and test results that are presently available; I have reviewed the patients medication list; I have reviewed the consulting providers response and recommendations. I have reviewed or attempted to review medical records based upon their availability.    All of the patient and family questions have been addressed and answered. Patient's is agreeable to the above stated plan. I will continue to monitor closely and make adjustments to medical management as needed.    WILSON Chong   05/20/2022

## 2022-05-21 VITALS
SYSTOLIC BLOOD PRESSURE: 105 MMHG | OXYGEN SATURATION: 99 % | DIASTOLIC BLOOD PRESSURE: 67 MMHG | WEIGHT: 169.75 LBS | HEART RATE: 80 BPM | HEIGHT: 69 IN | BODY MASS INDEX: 25.14 KG/M2 | RESPIRATION RATE: 18 BRPM | TEMPERATURE: 98 F

## 2022-05-21 LAB
POCT GLUCOSE: 194 MG/DL (ref 70–110)
POCT GLUCOSE: 260 MG/DL (ref 70–110)
POCT GLUCOSE: 279 MG/DL (ref 70–110)

## 2022-05-21 PROCEDURE — 63600175 PHARM REV CODE 636 W HCPCS: Performed by: INTERNAL MEDICINE

## 2022-05-21 PROCEDURE — 25000003 PHARM REV CODE 250: Performed by: INTERNAL MEDICINE

## 2022-05-21 PROCEDURE — 25000003 PHARM REV CODE 250: Performed by: EMERGENCY MEDICINE

## 2022-05-21 PROCEDURE — C9399 UNCLASSIFIED DRUGS OR BIOLOG: HCPCS | Performed by: INTERNAL MEDICINE

## 2022-05-21 PROCEDURE — 63600175 PHARM REV CODE 636 W HCPCS: Performed by: EMERGENCY MEDICINE

## 2022-05-21 PROCEDURE — 25000003 PHARM REV CODE 250: Performed by: NURSE PRACTITIONER

## 2022-05-21 RX ORDER — AMIODARONE HYDROCHLORIDE 200 MG/1
200 TABLET ORAL DAILY
Status: DISCONTINUED | OUTPATIENT
Start: 2022-05-28 | End: 2022-05-21 | Stop reason: HOSPADM

## 2022-05-21 RX ORDER — AMIODARONE HYDROCHLORIDE 400 MG/1
TABLET ORAL
Qty: 15 TABLET | Refills: 11 | Status: SHIPPED | OUTPATIENT
Start: 2022-05-21 | End: 2022-07-05

## 2022-05-21 RX ORDER — SPIRONOLACTONE 25 MG/1
25 TABLET ORAL 2 TIMES DAILY
Status: DISCONTINUED | OUTPATIENT
Start: 2022-05-21 | End: 2022-05-21 | Stop reason: HOSPADM

## 2022-05-21 RX ORDER — AMIODARONE HYDROCHLORIDE 200 MG/1
200 TABLET ORAL 2 TIMES DAILY
Status: DISCONTINUED | OUTPATIENT
Start: 2022-05-24 | End: 2022-05-21 | Stop reason: HOSPADM

## 2022-05-21 RX ORDER — AMIODARONE HYDROCHLORIDE 200 MG/1
400 TABLET ORAL 2 TIMES DAILY
Status: DISCONTINUED | OUTPATIENT
Start: 2022-05-21 | End: 2022-05-21 | Stop reason: HOSPADM

## 2022-05-21 RX ORDER — AMIODARONE HYDROCHLORIDE 400 MG/1
200 TABLET ORAL DAILY
Qty: 15 TABLET | Refills: 11 | Status: SHIPPED | OUTPATIENT
Start: 2022-05-21 | End: 2022-05-21 | Stop reason: SDUPTHER

## 2022-05-21 RX ORDER — METOPROLOL SUCCINATE 50 MG/1
100 TABLET, EXTENDED RELEASE ORAL DAILY
Status: DISCONTINUED | OUTPATIENT
Start: 2022-05-21 | End: 2022-05-21 | Stop reason: HOSPADM

## 2022-05-21 RX ADMIN — HYDROCODONE BITARTRATE AND ACETAMINOPHEN 1 TABLET: 5; 325 TABLET ORAL at 11:05

## 2022-05-21 RX ADMIN — APIXABAN 5 MG: 5 TABLET, FILM COATED ORAL at 08:05

## 2022-05-21 RX ADMIN — AMIODARONE HYDROCHLORIDE 0.5 MG/MIN: 1.8 INJECTION, SOLUTION INTRAVENOUS at 01:05

## 2022-05-21 RX ADMIN — METOPROLOL SUCCINATE 100 MG: 50 TABLET, FILM COATED, EXTENDED RELEASE ORAL at 09:05

## 2022-05-21 RX ADMIN — Medication 400 MG: at 08:05

## 2022-05-21 RX ADMIN — INSULIN ASPART 6 UNITS: 100 INJECTION, SOLUTION INTRAVENOUS; SUBCUTANEOUS at 11:05

## 2022-05-21 RX ADMIN — SPIRONOLACTONE 25 MG: 25 TABLET ORAL at 11:05

## 2022-05-21 RX ADMIN — PANTOPRAZOLE SODIUM 40 MG: 40 TABLET, DELAYED RELEASE ORAL at 08:05

## 2022-05-21 RX ADMIN — INSULIN DETEMIR 20 UNITS: 100 INJECTION, SOLUTION SUBCUTANEOUS at 08:05

## 2022-05-21 RX ADMIN — AMIODARONE HYDROCHLORIDE 400 MG: 200 TABLET ORAL at 11:05

## 2022-05-21 RX ADMIN — ATORVASTATIN CALCIUM 20 MG: 10 TABLET, FILM COATED ORAL at 08:05

## 2022-05-21 RX ADMIN — SACUBITRIL AND VALSARTAN 1 TABLET: 24; 26 TABLET, FILM COATED ORAL at 11:05

## 2022-05-21 RX ADMIN — HYDROCODONE BITARTRATE AND ACETAMINOPHEN 1 TABLET: 5; 325 TABLET ORAL at 07:05

## 2022-05-21 NOTE — DISCHARGE SUMMARY
[facility] - 4th Floor Medical Telemetry  Cardiology  Discharge Summary      Patient Name: Tashi Deluna  MRN: [id number]  Admission Date: 5/19/2022  Hospital Length of Stay: 1 days  Discharge Date and Time:  05/21/2022 9:51 AM  Attending Physician: Carlos Puga MD    Discharging Provider: MIO Kulkarni  Primary Care Physician: Guevara Torres MD    HPI:   No notes on file    * No surgery found *     Indwelling Lines/Drains at time of discharge:  Lines/Drains/Airways     None                 Hospital Course:  Mr. Deluna is a 61y/o male, followed by Dr. Hubbard/Susana, with PMHx significant for PAF/ablation, NICMO-BIVAICD, IDDM, pancreatic necrosis, and VSD with closure who presented to ED via EMS for C/o shooting left hip pain radiating to left thigh with weakness and dizziness. He was found to be in Afib, 's. SBP 90's. He was bolused with 800 ml NS and  5mg of Lopressor enroute but was still in 150's upon arrival to ED. [facility] consulted and recommended additional IVF's, digoxin, and amiodarone bolus/drip with resolution of RVR. Patient has converted to SR. BP stable. Urine dark- noted total bili elevatd. Still c/o constant shooting left hip pain. Denies injury        5/21/22: Patient converted to SR on amiodarone drip. VSS. Left hip xray revealing DJD.     Goals of Care Treatment Preferences:  Code Status: Full Code    Review of Systems   Respiratory: Negative.    Cardiovascular: Negative for chest pain and leg swelling.   Gastrointestinal: Negative for abdominal distention.   Genitourinary: Negative.    Musculoskeletal:        Constant Left hip pain radiating to thigh   Skin: Negative.    Neurological: Negative for dizziness, syncope and weakness.   Psychiatric/Behavioral: Negative.        Physical Exam  HENT:      Mouth/Throat:      Mouth: Mucous membranes are moist.   Cardiovascular:      Rate and Rhythm: Regular rhythm.      Heart sounds: Normal heart sounds.   Pulmonary:       Breath sounds: Normal breath sounds.   Abdominal:      Palpations: Abdomen is soft.   Musculoskeletal:         General: Normal range of motion.   Skin:     General: Skin is warm and dry.   Neurological:      General: No focal deficit present.   Psychiatric:         Mood and Affect: Mood normal.       Consults:   Consults (From admission, onward)        Status Ordering Provider     Inpatient consult to Hospital Medicine  Once        Provider:  (Not yet assigned)    Completed BRENDON HAYES          Significant Diagnostic Studies: Labs:   BMP:   Recent Labs   Lab 05/19/22  1851   *   K 4.8   CO2 22*   BUN 14.2   CREATININE 1.10   CALCIUM 8.0*    and CBC   Recent Labs   Lab 05/19/22  1851   WBC 5.4   HGB 10.5*   HCT 31.1*   *       Pending Diagnostic Studies:     None          Final Active Diagnoses:    Diagnosis Date Noted POA    PRINCIPAL PROBLEM:  Atrial fibrillation with rapid ventricular response [I48.91] 05/20/2022 Unknown    Leg pain [M79.606] 05/20/2022 Unknown    Hypotension [I95.9] 05/20/2022 Unknown    Chronic congestive heart failure [I50.9] 05/20/2022 Unknown      Problems Resolved During this Admission:     IMPRESSION:  Afib, RVR  NICMO  --BiV AICD  Chronic SHF   --EF 30%  --  CAD, distal LAD 60%  IDDM  --Hyperglycemic     Plan:  Patient has converted to SR on amiodarone drip. DC amiodarone drip. Start amiodarone load. Continue eliquis.   Denies SOB. Appears compensated. Resume entresto/Metoprolol succinate. Lasix, and spironolactone.   Xray hip revealing DJD and advised as needed Tylenol per hospitalist.    Discharged Condition: good    Disposition: Home or Self Care    Follow Up:   Follow-up Information     Kin Hubbard MD Follow up.    Specialty: Internal Medicine  Why: f/u 7-10days post discharge  Contact information:  [id number] [place] [id number]                       Patient Instructions:      Diet diabetic     Diet Cardiac     Activity as tolerated      Medications:  Reconciled Home Medications:      Medication List      START taking these medications    amiodarone 400 MG tablet  Commonly known as: PACERONE  Amiodarone 200mg tablets 400mg twice daily x 3 days then 200mg twice daily x 3 days then 200mg daily        CONTINUE taking these medications    cetirizine 10 mg chewable tablet  Take 10 mg by mouth once daily.     ELIQUIS 5 mg Tab  Generic drug: apixaban  Take 5 mg by mouth 2 (two) times daily.     ENTRESTO 24-26 mg per tablet  Generic drug: sacubitriL-valsartan  Take 1 tablet by mouth 2 (two) times daily.     furosemide 40 MG tablet  Commonly known as: LASIX  Take 40 mg by mouth once daily.     LANTUS U-100 INSULIN 100 unit/mL injection  Generic drug: insulin glargine  Inject into the skin.     magnesium oxide 400 mg (241.3 mg magnesium) tablet  Commonly known as: MAG-OX  Take 400 mg by mouth once daily.     metoprolol succinate 100 MG 24 hr tablet  Commonly known as: TOPROL-XL  Take 100 mg by mouth.     pantoprazole 40 MG tablet  Commonly known as: PROTONIX  Take 40 mg by mouth once daily.     rosuvastatin 10 MG tablet  Commonly known as: CRESTOR  Take 10 mg by mouth.     spironolactone 25 MG tablet  Commonly known as: ALDACTONE  Take 25 mg by mouth 2 (two) times daily.            Time spent on the discharge of patient: 30 minutes      George Naranjo MD  Cardiology

## 2022-05-21 NOTE — CONSULTS
[facility] Hospital Medicine Progress Note        Chief Complaint: Inpatient Follow-up for DM    HPI:   Tashi Deluna is a 62 y.o. male with a history of PAF/ablation, NICMO-BIV AICD, Chronic systolic HF (last echo on 03/24/2022 LVEF 30%), IDDM, pancreatic necrosis, and VSD with closure was admitted to [facility] under Cariology services, Dr. Carlos Puga, on 5/19/2022 with the diagnosis of atrial fibrillation with RVR requiring digoxin and an amiodarone drip with successful conversion to NSR. He intitally presented to the ED with complaints of right hip/buttocks pain that radiated to his thigh described as shooting/sharp as well as weakness and dizziness. He states the pain began on 05/16/2022 and is exacerbated by certain movements, but he denies any trauma. He has been compliant with his diabetic medications at home including his Novolog (sliding scale) and Lantus 20 units at night. Hospital Medicine team was consulted for diabetes management and further evaluation of left hip pain.      His vital signs are currently stable.  His labs showed a glucose 366 on 05/19/2022, but he was started on an insulin sliding scale this morning on 05/20/2022 and the nurse reported a CBG in the 200s. His home insulin was adjusted and patient continued on sliding scale. XR hip showed DJD. Advised as needed tylenol.     Interval Hx:   Patient today awake and comfortable. No new issues. Educated him about Diabetes and insulin regimen. Informed him about the XR hip showing DJD and advised as needed Tylenol.     Objective/physical exam:  General: In no acute distress, afebrile  Chest: Clear to auscultation bilaterally  Heart: RRR, +S1, S2, no appreciable murmur  Abdomen: Soft, nontender, BS +  MSK: Warm, no lower extremity edema, no clubbing or cyanosis  Neurologic: Alert and oriented x4, Cranial nerve II-XII intact, Strength 5/5 in all 4 extremities    VITAL SIGNS: 24 HRS MIN & MAX LAST   Temp  Min: 97.4 °F  (36.3 °C)  Max: 98.6 °F (37 °C) 98.6 °F (37 °C)   BP  Min: 100/67  Max: 128/77 128/77   Pulse  Min: 82  Max: 92  92   Resp  Min: 18  Max: 18 18   SpO2  Min: 99 %  Max: 100 % 100 %       Recent Labs   Lab 05/19/22  1851   WBC 5.4   RBC 3.47*   HGB 10.5*   HCT 31.1*   MCV 89.6   MCH 30.3   MCHC 33.8   RDW 14.5   *   MPV 11.1       Recent Labs   Lab 05/19/22  1851   *   K 4.8   CO2 22*   BUN 14.2   CREATININE 1.10   CALCIUM 8.0*   ALBUMIN 2.9*   ALKPHOS 64   ALT 10   AST 13   BILITOT 4.1*          Microbiology Results (last 7 days)     ** No results found for the last 168 hours. **           See below for Radiology    Scheduled Med:   apixaban  5 mg Oral BID    atorvastatin  20 mg Oral Daily    insulin detemir U-100  20 Units Subcutaneous Daily    magnesium oxide  400 mg Oral Daily    metoprolol succinate  25 mg Oral Daily    pantoprazole  40 mg Oral Daily    sodium chloride 0.9%  500 mL Intravenous Once        Continuous Infusions:   sodium chloride 0.9% 100 mL/hr (05/19/22 5115)    amiodarone in dextrose 5% 0.5 mg/min (05/21/22 0128)        PRN Meds:  acetaminophen, dextrose 10%, dextrose 10%, dextrose 50%, dextrose 50%, glucagon (human recombinant), glucose, glucose, HYDROcodone-acetaminophen, insulin aspart U-100, ondansetron       Assessment/Plan:    Hyperglycemia secondary to diabetes mellitus  Right hip pain possibly secondary to sciatica versus arthitis  Atrial fibrillation with RVR requiring amiodarone drip, converted to NSR  History of PAF/ablation  History of  NICMO-BIV AICD  History of Chronic systolic HF (last echo on 03/24/2022 LVEF 30%),    Plan:  Patient doing well with no acute issues.  Will increase levemir to 20 units sq q hs and cont sliding scale   Strict diabetic diet   Us as needed tylenol, for hip pain form DJD  Cont iv amiodarone for CIS team   Will closely monitor patients daily weight, urine out put, renal parameters and volume status           Patient condition:   Fair    Anticipated discharge and Disposition:      All diagnosis and differential diagnosis have been reviewed; assessment and plan has been documented; I have personally reviewed the labs and test results that are presently available; I have reviewed the patients medication list; I have reviewed the consulting providers response and recommendations. I have reviewed or attempted to review medical records based upon their availability    All of the patient's questions have been  addressed and answered. Patient's is agreeable to the above stated plan. I will continue to monitor closely and make adjustments to medical management as needed.  _____________________________________________________________________    Nutrition Status:    Radiology:  Echo Saline Bubble? No  · The left ventricle is normal in size with mild concentric hypertrophy   and mildly decreased systolic function.  · The estimated ejection fraction is 48%.  · Grade II left ventricular diastolic dysfunction.  · Mild right ventricular enlargement with low normal right ventricular   systolic function.  · Mild mitral regurgitation.  · Mild tricuspid regurgitation.     Echo density protruding from septum in LV. It does not resemble a   thrombus.  X-Ray Hip 2 or 3 views Right (with Pelvis when performed)  Narrative: EXAMINATION:  XR HIP WITH PELVIS WHEN PERFORMED, 2 OR 3  VIEWS RIGHT    CLINICAL HISTORY:  hip pain;    COMPARISON:  None.    FINDINGS:  No acute displaced fractures or dislocations.    There is narrowing of the inferior medial aspect of the hip joints with some degenerative changes of the lumbosacral spine articular spaces are otherwise preserved with smooth articular surfaces    No blastic or lytic lesions.    Soft tissues within normal limits.  Impression: Degenerative changes.    Electronically signed by: Erwin Bergman  Date:    05/20/2022  Time:    14:49  X-Ray Chest AP Portable  Narrative: EXAMINATION:  XR CHEST AP PORTABLE    CLINICAL  HISTORY:  Chest Pain;    TECHNIQUE:  Single view of the chest    COMPARISON:  04/12/2022    FINDINGS:  No focal opacification, pleural effusion, or pneumothorax.    The cardiomediastinal silhouette is within normal limits.  Left-sided cardiac device remains.    No acute osseous abnormality.  Impression: No acute cardiopulmonary process.    Electronically signed by: Hermelindo Russell  Date:    05/20/2022  Time:    06:40      Antonio Bhatt MD   05/21/2022

## 2022-05-21 NOTE — HOSPITAL COURSE
Mr. Deluna is a 61y/o male, followed by Dr. Hubbard/Susana, with PMHx significant for PAF/ablation, NICMO-BIVAICD, IDDM, pancreatic necrosis, and VSD with closure who presented to ED via EMS for C/o shooting left hip pain radiating to left thigh with weakness and dizziness. He was found to be in Afib, 's. SBP 90's. He was bolused with 800 ml NS and  5mg of Lopressor enroute but was still in 150's upon arrival to ED. [facility] consulted and recommended additional IVF's, digoxin, and amiodarone bolus/drip with resolution of RVR. Patient has converted to SR. BP stable. Urine dark- noted total bili elevatd. Still c/o constant shooting left hip pain. Denies injury

## 2022-05-23 ENCOUNTER — PATIENT OUTREACH (OUTPATIENT)
Dept: ADMINISTRATIVE | Facility: CLINIC | Age: 62
End: 2022-05-23
Payer: MEDICARE

## 2022-05-23 NOTE — PROGRESS NOTES
C3 nurse attempted to contact Tashi Deluna for a TCC post hospital discharge follow up call. No answer at phone number listed. Left message with call back number. Will discuss hospital follow up appointments when patient reached.

## 2022-05-24 NOTE — PROGRESS NOTES
C3 nurse attempted to contact Tashi Deluna for a TCC post hospital discharge follow up call. No answer at phone number listed. Left message with call back number.

## 2022-06-29 RX ORDER — DEXTROMETHORPHAN HYDROBROMIDE, GUAIFENESIN 5; 100 MG/5ML; MG/5ML
650 LIQUID ORAL EVERY 8 HOURS
COMMUNITY
End: 2023-12-05

## 2022-07-01 ENCOUNTER — LAB VISIT (OUTPATIENT)
Dept: LAB | Facility: HOSPITAL | Age: 62
End: 2022-07-01
Attending: INTERNAL MEDICINE
Payer: MEDICARE

## 2022-07-01 ENCOUNTER — ANESTHESIA EVENT (OUTPATIENT)
Dept: CARDIOLOGY | Facility: HOSPITAL | Age: 62
End: 2022-07-01
Payer: MEDICARE

## 2022-07-01 DIAGNOSIS — Z01.818 PRE-OP TESTING: ICD-10-CM

## 2022-07-01 LAB — SARS-COV-2 RNA RESP QL NAA+PROBE: NOT DETECTED

## 2022-07-01 PROCEDURE — 87635 SARS-COV-2 COVID-19 AMP PRB: CPT

## 2022-07-05 ENCOUNTER — HOSPITAL ENCOUNTER (OUTPATIENT)
Facility: HOSPITAL | Age: 62
Discharge: HOME | End: 2022-07-05
Attending: INTERNAL MEDICINE | Admitting: INTERNAL MEDICINE
Payer: MEDICARE

## 2022-07-05 ENCOUNTER — ANESTHESIA (OUTPATIENT)
Dept: CARDIOLOGY | Facility: HOSPITAL | Age: 62
End: 2022-07-05
Payer: MEDICARE

## 2022-07-05 VITALS
RESPIRATION RATE: 16 BRPM | HEART RATE: 83 BPM | BODY MASS INDEX: 21.29 KG/M2 | DIASTOLIC BLOOD PRESSURE: 87 MMHG | WEIGHT: 143.75 LBS | TEMPERATURE: 98 F | SYSTOLIC BLOOD PRESSURE: 114 MMHG | HEIGHT: 69 IN | OXYGEN SATURATION: 98 %

## 2022-07-05 DIAGNOSIS — I48.0 PAF (PAROXYSMAL ATRIAL FIBRILLATION): ICD-10-CM

## 2022-07-05 DIAGNOSIS — I48.91 ATRIAL FIBRILLATION WITH RAPID VENTRICULAR RESPONSE: Primary | ICD-10-CM

## 2022-07-05 DIAGNOSIS — Z01.818 PRE-OP TESTING: ICD-10-CM

## 2022-07-05 DIAGNOSIS — Z98.890 H/O CARDIAC RADIOFREQUENCY ABLATION: ICD-10-CM

## 2022-07-05 DIAGNOSIS — I48.0 PAROXYSMAL ATRIAL FIBRILLATION: ICD-10-CM

## 2022-07-05 LAB — POCT GLUCOSE: 319 MG/DL (ref 70–110)

## 2022-07-05 PROCEDURE — C1732 CATH, EP, DIAG/ABL, 3D/VECT: HCPCS | Performed by: INTERNAL MEDICINE

## 2022-07-05 PROCEDURE — 93010 ELECTROCARDIOGRAM REPORT: CPT | Mod: 59,,, | Performed by: INTERNAL MEDICINE

## 2022-07-05 PROCEDURE — 93650 ICAR CATH ABLTJ AV NODE FUNC: CPT | Performed by: INTERNAL MEDICINE

## 2022-07-05 PROCEDURE — 63600175 PHARM REV CODE 636 W HCPCS: Performed by: NURSE ANESTHETIST, CERTIFIED REGISTERED

## 2022-07-05 PROCEDURE — 93010 EKG 12-LEAD: ICD-10-PCS | Mod: 59,,, | Performed by: INTERNAL MEDICINE

## 2022-07-05 PROCEDURE — 37000009 HC ANESTHESIA EA ADD 15 MINS: Performed by: INTERNAL MEDICINE

## 2022-07-05 PROCEDURE — C1894 INTRO/SHEATH, NON-LASER: HCPCS | Performed by: INTERNAL MEDICINE

## 2022-07-05 PROCEDURE — 93005 ELECTROCARDIOGRAM TRACING: CPT | Mod: 59

## 2022-07-05 PROCEDURE — 27201423 OPTIME MED/SURG SUP & DEVICES STERILE SUPPLY: Performed by: INTERNAL MEDICINE

## 2022-07-05 PROCEDURE — C1760 CLOSURE DEV, VASC: HCPCS | Performed by: INTERNAL MEDICINE

## 2022-07-05 PROCEDURE — 37000008 HC ANESTHESIA 1ST 15 MINUTES: Performed by: INTERNAL MEDICINE

## 2022-07-05 PROCEDURE — 25000003 PHARM REV CODE 250: Performed by: INTERNAL MEDICINE

## 2022-07-05 PROCEDURE — C1730 CATH, EP, 19 OR FEW ELECT: HCPCS | Performed by: INTERNAL MEDICINE

## 2022-07-05 PROCEDURE — 25000003 PHARM REV CODE 250: Performed by: NURSE ANESTHETIST, CERTIFIED REGISTERED

## 2022-07-05 PROCEDURE — 63600175 PHARM REV CODE 636 W HCPCS: Performed by: INTERNAL MEDICINE

## 2022-07-05 DEVICE — SYS CLSR VASCADE MVP ID6-12FR: Type: IMPLANTABLE DEVICE | Site: GROIN | Status: FUNCTIONAL

## 2022-07-05 RX ORDER — METOCLOPRAMIDE HYDROCHLORIDE 5 MG/ML
10 INJECTION INTRAMUSCULAR; INTRAVENOUS ONCE
Status: COMPLETED | OUTPATIENT
Start: 2022-07-05 | End: 2022-07-05

## 2022-07-05 RX ORDER — SODIUM CHLORIDE 0.9 % (FLUSH) 0.9 %
3 SYRINGE (ML) INJECTION
Status: DISCONTINUED | OUTPATIENT
Start: 2022-07-05 | End: 2022-07-05 | Stop reason: HOSPADM

## 2022-07-05 RX ORDER — SODIUM CHLORIDE, SODIUM GLUCONATE, SODIUM ACETATE, POTASSIUM CHLORIDE AND MAGNESIUM CHLORIDE 30; 37; 368; 526; 502 MG/100ML; MG/100ML; MG/100ML; MG/100ML; MG/100ML
1000 INJECTION, SOLUTION INTRAVENOUS CONTINUOUS
Status: CANCELLED | OUTPATIENT
Start: 2022-07-05 | End: 2022-08-04

## 2022-07-05 RX ORDER — ONDANSETRON HYDROCHLORIDE 2 MG/ML
INJECTION, SOLUTION INTRAMUSCULAR; INTRAVENOUS
Status: DISCONTINUED | OUTPATIENT
Start: 2022-07-05 | End: 2022-07-05

## 2022-07-05 RX ORDER — ENOXAPARIN SODIUM 100 MG/ML
80 INJECTION SUBCUTANEOUS 2 TIMES DAILY
Status: ON HOLD | COMMUNITY
Start: 2022-06-08 | End: 2022-07-05 | Stop reason: HOSPADM

## 2022-07-05 RX ORDER — SODIUM CITRATE AND CITRIC ACID MONOHYDRATE 334; 500 MG/5ML; MG/5ML
30 SOLUTION ORAL ONCE
Status: COMPLETED | OUTPATIENT
Start: 2022-07-05 | End: 2022-07-05

## 2022-07-05 RX ORDER — CALCIUM CHLORIDE INJECTION 100 MG/ML
INJECTION, SOLUTION INTRAVENOUS
Status: DISCONTINUED | OUTPATIENT
Start: 2022-07-05 | End: 2022-07-05

## 2022-07-05 RX ORDER — GLUCAGON 1 MG
1 KIT INJECTION
Status: DISCONTINUED | OUTPATIENT
Start: 2022-07-05 | End: 2022-07-05 | Stop reason: HOSPADM

## 2022-07-05 RX ORDER — INSULIN ASPART 100 [IU]/ML
1-10 INJECTION, SOLUTION INTRAVENOUS; SUBCUTANEOUS EVERY 6 HOURS PRN
Status: DISCONTINUED | OUTPATIENT
Start: 2022-07-05 | End: 2022-07-05 | Stop reason: HOSPADM

## 2022-07-05 RX ORDER — LIDOCAINE HYDROCHLORIDE 20 MG/ML
INJECTION, SOLUTION EPIDURAL; INFILTRATION; INTRACAUDAL; PERINEURAL
Status: DISCONTINUED | OUTPATIENT
Start: 2022-07-05 | End: 2022-07-05

## 2022-07-05 RX ORDER — HYDROMORPHONE HYDROCHLORIDE 2 MG/ML
0.2 INJECTION, SOLUTION INTRAMUSCULAR; INTRAVENOUS; SUBCUTANEOUS EVERY 5 MIN PRN
Status: DISCONTINUED | OUTPATIENT
Start: 2022-07-05 | End: 2022-07-05 | Stop reason: HOSPADM

## 2022-07-05 RX ORDER — DIPHENHYDRAMINE HYDROCHLORIDE 50 MG/ML
25 INJECTION INTRAMUSCULAR; INTRAVENOUS EVERY 6 HOURS PRN
Status: DISCONTINUED | OUTPATIENT
Start: 2022-07-05 | End: 2022-07-05 | Stop reason: HOSPADM

## 2022-07-05 RX ORDER — ONDANSETRON 4 MG/1
4 TABLET, ORALLY DISINTEGRATING ORAL ONCE
Status: CANCELLED | OUTPATIENT
Start: 2022-07-05 | End: 2022-07-05

## 2022-07-05 RX ORDER — MIDAZOLAM HYDROCHLORIDE 1 MG/ML
2 INJECTION INTRAMUSCULAR; INTRAVENOUS ONCE AS NEEDED
Status: CANCELLED | OUTPATIENT
Start: 2022-07-05 | End: 2033-12-01

## 2022-07-05 RX ORDER — METOCLOPRAMIDE HYDROCHLORIDE 5 MG/ML
10 INJECTION INTRAMUSCULAR; INTRAVENOUS EVERY 10 MIN PRN
Status: DISCONTINUED | OUTPATIENT
Start: 2022-07-05 | End: 2022-07-05 | Stop reason: HOSPADM

## 2022-07-05 RX ORDER — PHENYLEPHRINE HCL IN 0.9% NACL 1 MG/10 ML
SYRINGE (ML) INTRAVENOUS
Status: DISCONTINUED | OUTPATIENT
Start: 2022-07-05 | End: 2022-07-05

## 2022-07-05 RX ORDER — PROPOFOL 10 MG/ML
VIAL (ML) INTRAVENOUS
Status: DISCONTINUED | OUTPATIENT
Start: 2022-07-05 | End: 2022-07-05

## 2022-07-05 RX ORDER — ONDANSETRON 2 MG/ML
4 INJECTION INTRAMUSCULAR; INTRAVENOUS DAILY PRN
Status: DISCONTINUED | OUTPATIENT
Start: 2022-07-05 | End: 2022-07-05 | Stop reason: HOSPADM

## 2022-07-05 RX ORDER — ROCURONIUM BROMIDE 10 MG/ML
INJECTION, SOLUTION INTRAVENOUS
Status: DISCONTINUED | OUTPATIENT
Start: 2022-07-05 | End: 2022-07-05

## 2022-07-05 RX ORDER — LIDOCAINE HYDROCHLORIDE 10 MG/ML
1 INJECTION, SOLUTION EPIDURAL; INFILTRATION; INTRACAUDAL; PERINEURAL ONCE
Status: CANCELLED | OUTPATIENT
Start: 2022-07-05 | End: 2022-07-05

## 2022-07-05 RX ORDER — HYDROCODONE BITARTRATE AND ACETAMINOPHEN 5; 325 MG/1; MG/1
1 TABLET ORAL EVERY 4 HOURS PRN
Status: DISCONTINUED | OUTPATIENT
Start: 2022-07-05 | End: 2022-07-05 | Stop reason: HOSPADM

## 2022-07-05 RX ORDER — MIDAZOLAM HYDROCHLORIDE 1 MG/ML
INJECTION INTRAMUSCULAR; INTRAVENOUS
Status: DISCONTINUED | OUTPATIENT
Start: 2022-07-05 | End: 2022-07-05

## 2022-07-05 RX ADMIN — MIDAZOLAM 2 MG: 1 INJECTION INTRAMUSCULAR; INTRAVENOUS at 09:07

## 2022-07-05 RX ADMIN — Medication 100 MCG: at 09:07

## 2022-07-05 RX ADMIN — SUGAMMADEX 150 MG: 100 INJECTION, SOLUTION INTRAVENOUS at 10:07

## 2022-07-05 RX ADMIN — INSULIN ASPART 8 UNITS: 100 INJECTION, SOLUTION INTRAVENOUS; SUBCUTANEOUS at 09:07

## 2022-07-05 RX ADMIN — SODIUM CITRATE AND CITRIC ACID MONOHYDRATE 30 ML: 500; 334 SOLUTION ORAL at 09:07

## 2022-07-05 RX ADMIN — PROPOFOL 100 MG: 10 INJECTION, EMULSION INTRAVENOUS at 09:07

## 2022-07-05 RX ADMIN — CALCIUM CHLORIDE 200 MG: 100 INJECTION INTRAVENOUS; INTRAVENTRICULAR at 09:07

## 2022-07-05 RX ADMIN — ROCURONIUM BROMIDE 60 MG: 10 SOLUTION INTRAVENOUS at 09:07

## 2022-07-05 RX ADMIN — LIDOCAINE HYDROCHLORIDE 40 MG: 20 INJECTION, SOLUTION EPIDURAL; INFILTRATION; INTRACAUDAL; PERINEURAL at 09:07

## 2022-07-05 RX ADMIN — ONDANSETRON 4 MG: 2 INJECTION INTRAMUSCULAR; INTRAVENOUS at 09:07

## 2022-07-05 RX ADMIN — Medication 100 MCG: at 10:07

## 2022-07-05 RX ADMIN — METOCLOPRAMIDE 10 MG: 5 INJECTION, SOLUTION INTRAMUSCULAR; INTRAVENOUS at 09:07

## 2022-07-05 RX ADMIN — CALCIUM CHLORIDE 300 MG: 100 INJECTION INTRAVENOUS; INTRAVENTRICULAR at 09:07

## 2022-07-05 RX ADMIN — SODIUM CHLORIDE, SODIUM GLUCONATE, SODIUM ACETATE, POTASSIUM CHLORIDE AND MAGNESIUM CHLORIDE: 526; 502; 368; 37; 30 INJECTION, SOLUTION INTRAVENOUS at 09:07

## 2022-07-05 RX ADMIN — ROCURONIUM BROMIDE 20 MG: 10 SOLUTION INTRAVENOUS at 09:07

## 2022-07-05 RX ADMIN — SODIUM CHLORIDE, SODIUM GLUCONATE, SODIUM ACETATE, POTASSIUM CHLORIDE AND MAGNESIUM CHLORIDE: 526; 502; 368; 37; 30 INJECTION, SOLUTION INTRAVENOUS at 10:07

## 2022-07-05 NOTE — Clinical Note
All sedation/medication and monitoring/vitals per anesthesia- please see anesthesia record for all details.

## 2022-07-05 NOTE — ANESTHESIA PREPROCEDURE EVALUATION
"                                                                                                             07/05/2022  Tashi Deluna is a 62 y.o., male with Chronic CHF and PAF.\  Diagnosis:        Paroxysmal atrial fibrillation       (Paroxysmal atrial fibrillation [I48.0])  He comes to [facility] Cath Lab for the noted procedure under GETA w/ arterial line and poss. [place].  Procedure:   ABLATION (N/A Chest)    PMHx:  Atrial fibrillation with rapid ventricular response Leg pain   Hypotension      Atrial fibrillation CHF (congestive heart failure)   SOB (shortness of breath) Fatigue   Type 1 diabetes mellitus GERD (gastroesophageal reflux disease)           PSHx:  INSERTION OF PACEMAKER Gastric Ulcer Sx   HERNIA REPAIR CARDIAC ELECTROPHYSIOLOGY STUDY AND ABLATION   CARDIOVERSION COLONOSCOPY           Lab Data:        Echo:          EKG:      Echo:      Pre Vitals  Current as of 07/05/22 0817  BP: 118/86 Pulse: 91   Resp:  SpO2: 90   Temp: 36.4 °C (97.6 °F)   Height: 5' 9" (1.753 m) (06/29/22) Weight: 68.5 kg (151 lb) (06/29/22)   BMI: 22.3 IBW: 70.7 kg (155 lb 15.1 oz)   Last edited 07/05/22 0811 by VICENTA      Pre-op Assessment    I have reviewed the Patient Summary Reports.     I have reviewed the Nursing Notes. I have reviewed the NPO Status.   I have reviewed the Medications.     Review of Systems  Anesthesia Hx:  No problems with previous Anesthesia    Social:  Non-Smoker    Hematology/Oncology:  Hematology Normal   Oncology Normal     EENT/Dental:EENT/Dental Normal   Cardiovascular:  Cardiovascular Normal Exercise tolerance: good   Functional Capacity good / => 4 METS    Pulmonary:  Pulmonary Normal    Renal/:  Renal/ Normal     Hepatic/GI:  Hepatic/GI Normal    Musculoskeletal:  Musculoskeletal Normal    Neurological:  Neurology Normal    Endocrine:  Endocrine Normal    Dermatological:  Skin Normal    Psych:  Psychiatric Normal           Physical Exam  General: Alert, Oriented, Well nourished and " Cooperative    Airway:  Mallampati: II   Mouth Opening: Normal  TM Distance: Normal  Tongue: Normal  Neck ROM: Normal ROM    Dental:  Intact    Chest/Lungs:  Clear to auscultation, Normal Respiratory Rate    Heart:  Rate: Normal  Rhythm: Regular Rhythm        Anesthesia Plan  Type of Anesthesia, risks & benefits discussed:    Anesthesia Type: Gen ETT  Intra-op Monitoring Plan: Standard ASA Monitors  Post Op Pain Control Plan: IV/PO Opioids PRN  Induction:  IV and Inhalation  Airway Plan: Direct  Informed Consent: Informed consent signed with the Patient and all parties understand the risks and agree with anesthesia plan.  All questions answered. Patient consented to blood products? Yes  ASA Score: 3  Day of Surgery Review of History & Physical: H&P Update referred to the surgeon/provider.    Ready For Surgery From Anesthesia Perspective.     .

## 2022-07-05 NOTE — DISCHARGE SUMMARY
[facility] - Cath Lab Services  Discharge Note  Short Stay    Procedure(s) (LRB):  ABLATION (N/A)    OUTCOME: Patient tolerated treatment/procedure well without complication and is now ready for discharge.    DISPOSITION: Home or Self Care    FINAL DIAGNOSIS:  <principal problem not specified>    FOLLOWUP: In clinic    DISCHARGE INSTRUCTIONS:    Discharge Procedure Orders   COVID-19 Routine Screening   Standing Status: Future Number of Occurrences: 1 Standing Exp. Date: 08/28/23     Order Specific Question Answer Comments   Is the patient symptomatic? No    Is this needed for pre-procedure or pre-op testing? Yes    Diagnosis: Pre-op testing [[id number]]         TIME SPENT ON DISCHARGE: 10 minutes

## 2022-07-05 NOTE — TRANSFER OF CARE
"Anesthesia Transfer of Care Note    Patient: Tashi Deluna    Procedure(s) Performed: Procedure(s) (LRB):  ABLATION (N/A)    Anesthesia Type: general    Transport from OR: Transported from OR on room air with adequate spontaneous ventilation    Post pain: adequate analgesia    Post assessment: no apparent anesthetic complications    Post vital signs: stable    Level of consciousness: alert and responds to stimulation    Nausea/Vomiting: no nausea/vomiting    Complications: none    Transfer of care protocol was followed      Last vitals:   Visit Vitals  /86   Pulse 91   Temp 36.4 °C (97.6 °F) (Oral)   Ht 5' 9" (1.753 m)   Wt 65.2 kg (143 lb 11.8 oz)   SpO2 (!) 90%   BMI 21.23 kg/m²     "

## 2022-07-06 NOTE — ANESTHESIA POSTPROCEDURE EVALUATION
Anesthesia Post Evaluation    Patient: Tashi Deluna    Procedure(s) Performed: Procedure(s) (LRB):  ABLATION (N/A)    Final Anesthesia Type: general      Patient location during evaluation: PACU  Patient participation: Yes- Able to Participate  Level of consciousness: awake and alert and oriented  Post-procedure vital signs: reviewed and stable  Pain management: adequate  Airway patency: patent  MINNIE mitigation strategies: Verification of full reversal of neuromuscular block  PONV status at discharge: No PONV  Anesthetic complications: no      Cardiovascular status: blood pressure returned to baseline and stable  Respiratory status: spontaneous ventilation and unassisted  Hydration status: euvolemic  Follow-up not needed.  Comments: [facility]          Vitals Value Taken Time   /87 07/05/22 1416   Temp 36.7 07/05/22 2146   Pulse 83 07/05/22 1416   Resp 12 07/05/22 1416   SpO2 98 % 07/05/22 1415   Vitals shown include unvalidated device data.      Event Time   Out of Recovery 11:57:00         Pain/Davis Score: Davis Score: 9 (7/5/2022 11:57 AM)

## 2023-03-13 ENCOUNTER — LAB VISIT (OUTPATIENT)
Dept: LAB | Facility: HOSPITAL | Age: 63
End: 2023-03-13
Attending: FAMILY MEDICINE
Payer: MEDICARE

## 2023-03-13 DIAGNOSIS — I50.20 HEART FAILURE, SYSTOLIC: ICD-10-CM

## 2023-03-13 DIAGNOSIS — R80.9 PROTEINURIA, UNSPECIFIED TYPE: ICD-10-CM

## 2023-03-13 DIAGNOSIS — K86.3 CYST AND PSEUDOCYST OF PANCREAS: ICD-10-CM

## 2023-03-13 DIAGNOSIS — Z79.899 ENCOUNTER FOR LONG-TERM (CURRENT) USE OF OTHER MEDICATIONS: ICD-10-CM

## 2023-03-13 DIAGNOSIS — K86.2 CYST AND PSEUDOCYST OF PANCREAS: ICD-10-CM

## 2023-03-13 DIAGNOSIS — E11.649 DIABETIC HYPOGLYCEMIA: ICD-10-CM

## 2023-03-13 DIAGNOSIS — I48.0 PAROXYSMAL ATRIAL FIBRILLATION: Primary | ICD-10-CM

## 2023-03-13 LAB
ALBUMIN SERPL-MCNC: 3.7 G/DL (ref 3.4–4.8)
ALP SERPL-CCNC: 65 UNIT/L (ref 40–150)
ALT SERPL-CCNC: 13 UNIT/L (ref 0–55)
ANION GAP SERPL CALC-SCNC: 9 MEQ/L
APPEARANCE UR: ABNORMAL
AST SERPL-CCNC: 16 UNIT/L (ref 5–34)
BACTERIA #/AREA URNS AUTO: NORMAL /HPF
BILIRUB UR QL STRIP.AUTO: NEGATIVE MG/DL
BILIRUBIN DIRECT+TOT PNL SERPL-MCNC: 0.8 MG/DL (ref 0–?)
BILIRUBIN DIRECT+TOT PNL SERPL-MCNC: 2.2 MG/DL (ref 0–0.8)
BILIRUBIN DIRECT+TOT PNL SERPL-MCNC: 3 MG/DL
BUN SERPL-MCNC: 10.6 MG/DL (ref 8.4–25.7)
CALCIUM SERPL-MCNC: 8.9 MG/DL (ref 8.8–10)
CHLORIDE SERPL-SCNC: 100 MMOL/L (ref 98–107)
CHOLEST SERPL-MCNC: 194 MG/DL
CHOLEST/HDLC SERPL: 3 {RATIO} (ref 0–5)
CO2 SERPL-SCNC: 29 MMOL/L (ref 23–31)
COLOR UR AUTO: YELLOW
CREAT SERPL-MCNC: 0.86 MG/DL (ref 0.73–1.18)
CREAT/UREA NIT SERPL: 12
ERYTHROCYTE [DISTWIDTH] IN BLOOD BY AUTOMATED COUNT: 12.7 % (ref 11.5–17)
EST. AVERAGE GLUCOSE BLD GHB EST-MCNC: 306.3 MG/DL
FT4I SERPL CALC-MCNC: 3.28 (ref 2.6–3.6)
GFR SERPLBLD CREATININE-BSD FMLA CKD-EPI: >60 MLS/MIN/1.73/M2
GLUCOSE SERPL-MCNC: 200 MG/DL (ref 82–115)
GLUCOSE UR QL STRIP.AUTO: 500 MG/DL
HBA1C MFR BLD: 12.3 %
HCT VFR BLD AUTO: 41.1 % (ref 42–52)
HDLC SERPL-MCNC: 77 MG/DL (ref 35–60)
HGB BLD-MCNC: 14.3 G/DL (ref 14–18)
KETONES UR QL STRIP.AUTO: NEGATIVE MG/DL
LDH SERPL-CCNC: 230 U/L (ref 125–220)
LDLC SERPL CALC-MCNC: 103 MG/DL (ref 50–140)
LEUKOCYTE ESTERASE UR QL STRIP.AUTO: NEGATIVE UNIT/L
MCH RBC QN AUTO: 28.5 PG
MCHC RBC AUTO-ENTMCNC: 34.8 G/DL (ref 33–36)
MCV RBC AUTO: 82 FL (ref 80–94)
MICROALBUMIN UR-MCNC: 114.9 UG/ML
NITRITE UR QL STRIP.AUTO: NEGATIVE
PH UR STRIP.AUTO: 7 [PH]
PLATELET # BLD AUTO: 123 X10(3)/MCL (ref 130–400)
PMV BLD AUTO: 9 FL (ref 7.4–10.4)
POTASSIUM SERPL-SCNC: 3.1 MMOL/L (ref 3.5–5.1)
PROT SERPL-MCNC: 6.6 GM/DL (ref 5.8–7.6)
PROT UR QL STRIP.AUTO: 30 MG/DL
RBC # BLD AUTO: 5.01 X10(6)/MCL (ref 4.7–6.1)
RBC #/AREA URNS AUTO: NORMAL /HPF
RBC UR QL AUTO: NEGATIVE UNIT/L
SODIUM SERPL-SCNC: 138 MMOL/L (ref 136–145)
SP GR UR STRIP.AUTO: 1.02
SQUAMOUS #/AREA URNS AUTO: NORMAL /HPF
T3RU NFR SERPL: 38.96 % (ref 31–39)
T4 SERPL-MCNC: 8.41 UG/DL (ref 4.87–11.72)
TRIGL SERPL-MCNC: 70 MG/DL (ref 34–140)
TSH SERPL-ACNC: 2.39 UIU/ML (ref 0.35–4.94)
UROBILINOGEN UR STRIP-ACNC: 4 MG/DL
VLDLC SERPL CALC-MCNC: 14 MG/DL
WBC # SPEC AUTO: 3.7 X10(3)/MCL (ref 4.5–11.5)
WBC #/AREA URNS AUTO: NORMAL /HPF

## 2023-03-13 PROCEDURE — 83036 HEMOGLOBIN GLYCOSYLATED A1C: CPT

## 2023-03-13 PROCEDURE — 36415 COLL VENOUS BLD VENIPUNCTURE: CPT

## 2023-03-13 PROCEDURE — 85027 COMPLETE CBC AUTOMATED: CPT

## 2023-03-13 PROCEDURE — 80061 LIPID PANEL: CPT

## 2023-03-13 PROCEDURE — 84436 ASSAY OF TOTAL THYROXINE: CPT

## 2023-03-13 PROCEDURE — 81001 URINALYSIS AUTO W/SCOPE: CPT

## 2023-03-13 PROCEDURE — 80048 BASIC METABOLIC PNL TOTAL CA: CPT

## 2023-03-13 PROCEDURE — 83615 LACTATE (LD) (LDH) ENZYME: CPT

## 2023-03-13 PROCEDURE — 80076 HEPATIC FUNCTION PANEL: CPT

## 2023-03-13 PROCEDURE — 82043 UR ALBUMIN QUANTITATIVE: CPT

## 2023-03-13 PROCEDURE — 84443 ASSAY THYROID STIM HORMONE: CPT

## 2023-12-05 ENCOUNTER — TELEPHONE (OUTPATIENT)
Dept: SURGERY | Facility: CLINIC | Age: 63
End: 2023-12-05

## 2023-12-05 ENCOUNTER — OFFICE VISIT (OUTPATIENT)
Dept: SURGERY | Facility: CLINIC | Age: 63
End: 2023-12-05
Payer: MEDICARE

## 2023-12-05 VITALS
HEIGHT: 69 IN | DIASTOLIC BLOOD PRESSURE: 87 MMHG | BODY MASS INDEX: 22.66 KG/M2 | WEIGHT: 153 LBS | SYSTOLIC BLOOD PRESSURE: 135 MMHG | HEART RATE: 82 BPM

## 2023-12-05 DIAGNOSIS — K43.9 VENTRAL HERNIA WITHOUT OBSTRUCTION OR GANGRENE: Primary | ICD-10-CM

## 2023-12-05 DIAGNOSIS — R10.31 RIGHT LOWER QUADRANT ABDOMINAL PAIN: ICD-10-CM

## 2023-12-05 DIAGNOSIS — K40.91 RECURRENT RIGHT INGUINAL HERNIA: Primary | ICD-10-CM

## 2023-12-05 DIAGNOSIS — K40.91 RECURRENT RIGHT INGUINAL HERNIA: ICD-10-CM

## 2023-12-05 DIAGNOSIS — K43.9 VENTRAL HERNIA WITHOUT OBSTRUCTION OR GANGRENE: ICD-10-CM

## 2023-12-05 PROBLEM — K40.90 RIGHT INGUINAL HERNIA: Status: ACTIVE | Noted: 2023-12-05

## 2023-12-05 PROCEDURE — 99204 OFFICE O/P NEW MOD 45 MIN: CPT | Mod: ,,, | Performed by: SURGERY

## 2023-12-05 PROCEDURE — 99204 PR OFFICE/OUTPT VISIT, NEW, LEVL IV, 45-59 MIN: ICD-10-PCS | Mod: ,,, | Performed by: SURGERY

## 2023-12-05 NOTE — TELEPHONE ENCOUNTER
CT scheduled at [facility] 12/19/2023 at 10:45 a.m.   Pt to fast 4hrs prior to test.  Attempted to contact pt, no answer, left detailed message

## 2023-12-05 NOTE — TELEPHONE ENCOUNTER
Pt scheduled for upcoming appt with Dr. Tierney. Per medical hx pt had hx of hernia repair. Attempted to contact pt to see what type of hernia repair, when and who performed repair.   No answer, LVM

## 2023-12-05 NOTE — TELEPHONE ENCOUNTER
Pt needs CT Abdomen/Pelvis W contrast  Dx: right inguinal hernia and abdominal wall hernia     FU in office after testing    Also needing cardiac clearance

## 2023-12-05 NOTE — PROGRESS NOTES
"[facility] - General Surgery Services  [address] [place] [address]  Phone: [phone]  Fax: [phone]     HISTORY & PHYSICAL  General Surgery  Dr. Jean Tierney      Patient Name: Tashi Deluna  YOB: 1960  Author: Ana María Rogel, ANP     Date: 12/05/2023                   SUBJECTIVE:     Chief Complaint/Reason for Admission:   Chief Complaint   Patient presents with    Consult     Right groin pain; possible hernia        History of Present Illness:  Mr. Tashi Deluna is a 63 y.o. male who presents to clinic for surgical evaluation of recurrent right inguinal hernia and abdominal bulge.    He started noticing a bulge in his right groin.  It occasionally causes some pain and discomfort. Also has bulge to abdominal wall.   Denies any changes to bowel / bladder habits. He denies CP/SOB or fever/chills.     Open right inguinal hernia repair as a child, age 7.     Previous surgery for " gastric ulcer repair" per Dr. Tierney, no op report available.     Positive symptoms:   Groin Pain right, Testicular Pain right, and Groin bulge      Referring provider: No ref. provider found   PCP: Guevara Torres MD     Review of Systems:  12 point ROS negative except as stated in HPI    PAST HISTORY:     Past Medical History:   Diagnosis Date    Atrial fibrillation     CHF (congestive heart failure)     Fatigue     GERD (gastroesophageal reflux disease)     Inguinal hernia     SOB (shortness of breath)     Type 1 diabetes mellitus      Past Surgical History:   Procedure Laterality Date    ABLATION N/A 07/05/2022    Procedure: ABLATION;  Surgeon: Kin Hubbard MD;  Location: [facility] CATH LAB;  Service: Cardiology;  Laterality: N/A;  AV NODE ABLATION W/ ANEST.    CARDIAC ELECTROPHYSIOLOGY STUDY AND ABLATION      CARDIOVERSION      COLONOSCOPY  2021    Gastric Ulcer Sx  2009    Dr. tierney    HERNIA REPAIR Right     Open (6 years old)    INSERTION OF PACEMAKER  04/2022 "     No family history on file.  Social History     Socioeconomic History    Marital status: Single   Tobacco Use    Smoking status: Former    Smokeless tobacco: Never   Substance and Sexual Activity    Alcohol use: Never    Drug use: Never       MEDICATIONS & ALLERGIES:     Current Outpatient Medications on File Prior to Visit   Medication Sig    cyanocobalamin, vitamin B-12, (VITAMIN B-12 ORAL) Take by mouth.    ELIQUIS 5 mg Tab Take 5 mg by mouth 2 (two) times daily.    ENTRESTO 24-26 mg per tablet Take 1 tablet by mouth 2 (two) times daily.    ergocalciferol, vitamin D2, (VITAMIN D ORAL) Take by mouth.    insulin regular 100 unit/mL Inj injection Inject into the skin. Per sliding scale    LANTUS U-100 INSULIN 100 unit/mL injection Inject 20 Units into the skin Daily.    metoprolol succinate (TOPROL-XL) 100 MG 24 hr tablet Take 100 mg by mouth once daily.    pantoprazole (PROTONIX) 40 MG tablet Take 40 mg by mouth once daily.    rosuvastatin (CRESTOR) 10 MG tablet Take 10 mg by mouth once daily.    spironolactone (ALDACTONE) 25 MG tablet Take 25 mg by mouth 2 (two) times daily.    [DISCONTINUED] acetaminophen (TYLENOL) 650 MG TbSR Take 650 mg by mouth every 8 (eight) hours.    [DISCONTINUED] amiodarone (PACERONE) 400 MG tablet Amiodarone 200mg tablets 400mg twice daily x 3 days then 200mg twice daily x 3 days then 200mg daily (Patient taking differently: once daily. Amiodarone 200mg tablets 400mg twice daily x 3 days then 200mg twice daily x 3 days then 200mg daily)    [DISCONTINUED] cetirizine 10 mg chewable tablet Take 10 mg by mouth once daily.    [DISCONTINUED] furosemide (LASIX) 40 MG tablet Take 40 mg by mouth once daily.    [DISCONTINUED] insulin regular 100 unit/mL Inj injection Inject into the skin 3 (three) times daily before meals.    [DISCONTINUED] magnesium oxide (MAG-OX) 400 mg (241.3 mg magnesium) tablet Take 400 mg by mouth 2 (two) times daily.     No current facility-administered medications on  "file prior to visit.     Review of patient's allergies indicates:   Allergen Reactions    Amoxicillin Rash       OBJECTIVE:     Vitals:    12/05/23 1003   BP: 135/87   Pulse: 82   Weight: 69.4 kg (153 lb)   Height: 5' 9" (1.753 m)     Body mass index is 22.59 kg/m².    Physical Exam:  General:  Well developed, well nourished, no acute distress  HEENT:  Normocephalic, atraumatic, PERRL, EOMI, clear sclera, ears normal, neck supple, throat clear without erythema or exudates  CVS:  RRR, S1 and S2 normal, no murmurs, rubs, gallops  Resp:  Lungs clear to auscultation, no wheezes, rales, rhonchi, cough  GI:  Abdomen soft, non-tender, non-distended, normoactive bowel sounds, no masses, large ventral hernia.   :      R groin - Reducible Right Inguinal Hernia, large, partially reducible due to size, extends into scrotum. nttp, testicle unremarkable  L groin -  no hernia appreciated, nttp, testicle unremarkable  MSK:  No muscle atrophy, cyanosis, peripheral edema, full range of motion  Skin:  No rashes, ulcers, erythema  Neuro:  CNII-XII grossly intact  Psych:  Alert and oriented to person, place, and time    Results:  I have independently reviewed all pertinent lab and radiologic studies relevant to general surgery.      VISIT DIAGNOSES:       ICD-10-CM ICD-9-CM   1. Recurrent right inguinal hernia  K40.91 550.91   2. Ventral hernia without obstruction or gangrene  K43.9 553.20       ASSESSMENT/PLAN:     Impression: Large recurrent right inguinal hernia extending into scrotum, large complex abdominal wall hernia    Plan:  - Due to complexity of RIH and abd hernia, would recommend imaging first.   - Order CT abdomen and pelvis with contrast.   - FU in Jan 2024  - No evidence of hernia incarceration at this time, no emergent surgery recommended. FU sooner if symptoms worsen. Dr. Tierney discussed S&S of incarceration of hernia.  - Dr. Tierney examined patient, discussed recommendations, obtained informed consent, and " answered all questions with patient/family.    MARSHA Ramos      I, MARSHA Bowen, am scribing for, and in the presence of, Jean Tierney MD, performed the above scribed service and the documentation accurately describes the services I performed. I attest to the accuracy of the note.

## 2023-12-05 NOTE — TELEPHONE ENCOUNTER
CT scheduled at [facility] 12/19/2023 at 10:45 a.m.   Pt to fast 4hrs prior to test.  Attempted to contact pt, no answer, left detailed message    Scheduled pt for follow up with Dr. Tierney 1/4/2024 at 2:45 p.m.     (Reminder set)     Cardiac clearance requested

## 2023-12-19 ENCOUNTER — HOSPITAL ENCOUNTER (OUTPATIENT)
Dept: RADIOLOGY | Facility: HOSPITAL | Age: 63
Discharge: HOME | End: 2023-12-19
Attending: SURGERY
Payer: MEDICARE

## 2023-12-19 DIAGNOSIS — K43.9 VENTRAL HERNIA WITHOUT OBSTRUCTION OR GANGRENE: ICD-10-CM

## 2023-12-19 DIAGNOSIS — K40.91 RECURRENT RIGHT INGUINAL HERNIA: ICD-10-CM

## 2023-12-19 LAB
CREAT SERPL-MCNC: 0.6 MG/DL (ref 0.5–1.4)
SAMPLE: NORMAL

## 2023-12-19 PROCEDURE — 25500020 PHARM REV CODE 255: Performed by: SURGERY

## 2023-12-19 PROCEDURE — 74177 CT ABD & PELVIS W/CONTRAST: CPT | Mod: TC

## 2023-12-19 RX ADMIN — DIATRIZOATE MEGLUMINE AND DIATRIZOATE SODIUM 30 ML: 660; 100 LIQUID ORAL; RECTAL at 10:12

## 2023-12-19 RX ADMIN — IOPAMIDOL 100 ML: 755 INJECTION, SOLUTION INTRAVENOUS at 11:12

## 2024-01-04 ENCOUNTER — OFFICE VISIT (OUTPATIENT)
Dept: SURGERY | Facility: CLINIC | Age: 64
End: 2024-01-04
Payer: MEDICARE

## 2024-01-04 ENCOUNTER — LAB VISIT (OUTPATIENT)
Dept: LAB | Facility: HOSPITAL | Age: 64
End: 2024-01-04
Attending: SURGERY
Payer: MEDICARE

## 2024-01-04 VITALS
BODY MASS INDEX: 23.85 KG/M2 | HEART RATE: 86 BPM | WEIGHT: 161 LBS | HEIGHT: 69 IN | DIASTOLIC BLOOD PRESSURE: 89 MMHG | SYSTOLIC BLOOD PRESSURE: 129 MMHG

## 2024-01-04 DIAGNOSIS — Z01.818 PRE-OPERATIVE EXAMINATION: ICD-10-CM

## 2024-01-04 DIAGNOSIS — K40.90 LEFT INGUINAL HERNIA: ICD-10-CM

## 2024-01-04 DIAGNOSIS — K43.2 INCISIONAL HERNIA OF ANTERIOR ABDOMINAL WALL WITHOUT OBSTRUCTION OR GANGRENE: ICD-10-CM

## 2024-01-04 DIAGNOSIS — K40.91 RECURRENT RIGHT INGUINAL HERNIA: Primary | ICD-10-CM

## 2024-01-04 DIAGNOSIS — K80.20 CALCULUS OF GALLBLADDER WITHOUT CHOLECYSTITIS WITHOUT OBSTRUCTION: ICD-10-CM

## 2024-01-04 LAB
ALBUMIN SERPL-MCNC: 3.7 G/DL (ref 3.4–4.8)
ALBUMIN/GLOB SERPL: 1.5 RATIO (ref 1.1–2)
ALP SERPL-CCNC: 62 UNIT/L (ref 40–150)
ALT SERPL-CCNC: 11 UNIT/L (ref 0–55)
AST SERPL-CCNC: 16 UNIT/L (ref 5–34)
BASOPHILS # BLD AUTO: 0.03 X10(3)/MCL
BASOPHILS NFR BLD AUTO: 0.8 %
BILIRUB SERPL-MCNC: 2.9 MG/DL
BUN SERPL-MCNC: 7.9 MG/DL (ref 8.4–25.7)
CALCIUM SERPL-MCNC: 8.5 MG/DL (ref 8.8–10)
CHLORIDE SERPL-SCNC: 106 MMOL/L (ref 98–107)
CO2 SERPL-SCNC: 28 MMOL/L (ref 23–31)
CREAT SERPL-MCNC: 0.71 MG/DL (ref 0.73–1.18)
EOSINOPHIL # BLD AUTO: 0.03 X10(3)/MCL (ref 0–0.9)
EOSINOPHIL NFR BLD AUTO: 0.8 %
ERYTHROCYTE [DISTWIDTH] IN BLOOD BY AUTOMATED COUNT: 13.3 % (ref 11.5–17)
GFR SERPLBLD CREATININE-BSD FMLA CKD-EPI: >60 MLS/MIN/1.73/M2
GLOBULIN SER-MCNC: 2.4 GM/DL (ref 2.4–3.5)
GLUCOSE SERPL-MCNC: 127 MG/DL (ref 82–115)
HCT VFR BLD AUTO: 41.7 % (ref 42–52)
HGB BLD-MCNC: 14.3 G/DL (ref 14–18)
IMM GRANULOCYTES # BLD AUTO: 0.01 X10(3)/MCL (ref 0–0.04)
IMM GRANULOCYTES NFR BLD AUTO: 0.3 %
LYMPHOCYTES # BLD AUTO: 1.17 X10(3)/MCL (ref 0.6–4.6)
LYMPHOCYTES NFR BLD AUTO: 30.1 %
MCH RBC QN AUTO: 29.3 PG (ref 27–31)
MCHC RBC AUTO-ENTMCNC: 34.3 G/DL (ref 33–36)
MCV RBC AUTO: 85.5 FL (ref 80–94)
MONOCYTES # BLD AUTO: 0.36 X10(3)/MCL (ref 0.1–1.3)
MONOCYTES NFR BLD AUTO: 9.3 %
NEUTROPHILS # BLD AUTO: 2.29 X10(3)/MCL (ref 2.1–9.2)
NEUTROPHILS NFR BLD AUTO: 58.7 %
NRBC BLD AUTO-RTO: 0 %
PLATELET # BLD AUTO: 144 X10(3)/MCL (ref 130–400)
PMV BLD AUTO: 10 FL (ref 7.4–10.4)
POTASSIUM SERPL-SCNC: 3.9 MMOL/L (ref 3.5–5.1)
PROT SERPL-MCNC: 6.1 GM/DL (ref 5.8–7.6)
RBC # BLD AUTO: 4.88 X10(6)/MCL (ref 4.7–6.1)
SODIUM SERPL-SCNC: 139 MMOL/L (ref 136–145)
WBC # SPEC AUTO: 3.89 X10(3)/MCL (ref 4.5–11.5)

## 2024-01-04 PROCEDURE — 93005 ELECTROCARDIOGRAM TRACING: CPT

## 2024-01-04 PROCEDURE — 93010 ELECTROCARDIOGRAM REPORT: CPT | Mod: ,,, | Performed by: INTERNAL MEDICINE

## 2024-01-04 PROCEDURE — 99214 OFFICE O/P EST MOD 30 MIN: CPT | Mod: ,,, | Performed by: SURGERY

## 2024-01-04 PROCEDURE — 36415 COLL VENOUS BLD VENIPUNCTURE: CPT

## 2024-01-04 NOTE — PROGRESS NOTES
"[facility] Surgical - General Surgery Services  [address] [place] [id number]  Phone: [phone]  Fax: [phone]     HISTORY & PHYSICAL  General Surgery  Dr. Jean Tierney      Patient Name: Tashi Deluna  YOB: 1960  Author: Ana María Rogel, ANP     Date: 01/04/2024                   SUBJECTIVE:     Chief Complaint/Reason for Admission:   Chief Complaint   Patient presents with    Follow-up     Discuss CT         History of Present Illness:   Mr. Tashi Deluna is a 63 y.o. male who presents to clinic for surgical evaluation of recurrent right inguinal hernia and abdominal bulge and to discuss recent CT results.      C/o large bulge in his right groin for many years, increasing in size gradually.  It occasionally causes some pain and discomfort. Also has bulge to abdominal wall, present for many years.   Denies any changes to bowel / bladder habits. He denies CP/SOB or fever/chills.      Open right inguinal hernia repair as a child, age 7.      Previous surgery for " gastric ulcer repair" per Dr. Tierney, no op report available.      Positive symptoms:   Groin Pain right, Testicular Pain right, and Groin bulge      CT abd pelvis w contrast 12/19/23:    Narrative & Impression  EXAMINATION:  CT ABDOMEN PELVIS WITH IV CONTRAST     CLINICAL HISTORY:  Abdominal pain, post-op;Right inguinal hernia and abdominal wall hernia; Ventral hernia without obstruction or gangrene     TECHNIQUE:  Low dose axial images, sagittal and coronal reformations were obtained from the lung bases to the pubic symphysis following the IV administration of contrast. Automatic exposure control (AEC) is utilized to reduce patient radiation exposure.     COMPARISON:  09/20/2020     FINDINGS:  The lung bases are clear.     The liver appears normal.  There is a focal enhancing lesion seen in the dome of the liver on image 28 series 2.  Similar changes were seen previously.  Findings most likely " represent a hemangioma..  Portal and hepatic veins appear normal.     There is a large gallstone in the gallbladder..  Mild pericholecystic fluid seen.  There is some mild intra and extrahepatic biliary dilatation.  If gallbladder pathology is suspected ultrasound correlation is recommended.     .     The pancreas appears normal.  No pancreatic mass or lesion is seen.     The spleen shows no acute abnormality.     The adrenal glands appear normal.  No adrenal nodule is seen.     The kidneys appear normal.  No hydronephrosis is seen.  No hydroureter is seen.  No nephrolithiasis is seen.  No obvious ureteral stones are seen.     Urinary bladder appears grossly unremarkable.     There is an anterior abdominal wall ventral hernia seen containing a loop of small bowel.  This was seen on the prior examination as well.  Patient seems have undergone a previous ventral hernia repair surgery as well.  No obstructive changes are seen.     There is a large right inguinal hernia seen containing multiple loops of small bowel and omentum as well as mesenteric vessels.  This was seen on the prior examination as well but appears significantly larger today.  No obstruction is seen.     There is a small left inguinal hernia seen containing some fat and some vessels.  This was seen on the prior examination as well.     There are dilated pelvic veins seen on the left side from internal iliac vein branches and a large venous varix seen in the pelvis.  Similar changes were seen on the prior examination but they appear more prominent today.     Impression:     Large right inguinal hernia containing multiple loops of small bowel and mesenteric vasculature and omentum.  No obstruction is seen     Anterior abdominal wall ventral hernia containing a loop small bowel but no obstruction is seen.     Small left inguinal hernia contains some fat and vessels     Dilated vein seen in the left deep pelvis.  Etiology is uncertain.  Similar changes  were seen previously but there appear more prominent today.     Gallstone with small amount of pericholecystic fluid and some intra and extrahepatic biliary dilatation seen.  If gallbladder pathology is suspected ultrasound correlation is recommended     Focal enhancing lesion in the dome of the liver consistent with likely meningioma.  Findings are stable since prior examination     This report was flagged in Epic as abnormal.  Referring provider: No ref. provider found   PCP: Guevara Torres MD   Referring provider: No ref. provider found   PCP: Guevara Torres MD     Review of Systems:  12 point ROS negative except as stated in HPI    PAST HISTORY:     Past Medical History:   Diagnosis Date    Atrial fibrillation     CHF (congestive heart failure)     Fatigue     GERD (gastroesophageal reflux disease)     Inguinal hernia     SOB (shortness of breath)     Type 1 diabetes mellitus      Past Surgical History:   Procedure Laterality Date    ABLATION N/A 07/05/2022    Procedure: ABLATION;  Surgeon: Kin Hubbard MD;  Location: [facility] CATH LAB;  Service: Cardiology;  Laterality: N/A;  AV NODE ABLATION W/ ANEST.    CARDIAC ELECTROPHYSIOLOGY STUDY AND ABLATION      CARDIOVERSION      COLONOSCOPY  2021    Gastric Ulcer Sx  2009    Dr. aguiar    HERNIA REPAIR Right     Open (6 years old)    INSERTION OF PACEMAKER  04/2022     Family History   Problem Relation Age of Onset    ALS Mother      Social History     Socioeconomic History    Marital status: Single   Tobacco Use    Smoking status: Former    Smokeless tobacco: Never   Substance and Sexual Activity    Alcohol use: Never    Drug use: Never       MEDICATIONS & ALLERGIES:     Current Outpatient Medications on File Prior to Visit   Medication Sig    cyanocobalamin, vitamin B-12, (VITAMIN B-12 ORAL) Take by mouth.    ELIQUIS 5 mg Tab Take 5 mg by mouth 2 (two) times daily.    ENTRESTO 24-26 mg per tablet Take 1 tablet by mouth 2 (two) times daily.    ergocalciferol,  "vitamin D2, (VITAMIN D ORAL) Take by mouth.    insulin regular 100 unit/mL Inj injection Inject into the skin. Per sliding scale    LANTUS U-100 INSULIN 100 unit/mL injection Inject 20 Units into the skin Daily.    metoprolol succinate (TOPROL-XL) 100 MG 24 hr tablet Take 100 mg by mouth once daily.    pantoprazole (PROTONIX) 40 MG tablet Take 40 mg by mouth once daily.    rosuvastatin (CRESTOR) 10 MG tablet Take 10 mg by mouth once daily.    spironolactone (ALDACTONE) 25 MG tablet Take 25 mg by mouth 2 (two) times daily.    [DISCONTINUED] amiodarone (PACERONE) 400 MG tablet Amiodarone 200mg tablets 400mg twice daily x 3 days then 200mg twice daily x 3 days then 200mg daily (Patient taking differently: once daily. Amiodarone 200mg tablets 400mg twice daily x 3 days then 200mg twice daily x 3 days then 200mg daily)     No current facility-administered medications on file prior to visit.     Review of patient's allergies indicates:   Allergen Reactions    Amoxicillin Rash       OBJECTIVE:     Vitals:    01/04/24 1417   BP: 129/89   Pulse: 86   Weight: 73 kg (161 lb)   Height: 5' 9" (1.753 m)     Body mass index is 23.78 kg/m².    Physical Exam:  General:  Well developed, well nourished, no acute distress  HEENT:  Normocephalic, atraumatic, PERRL, EOMI, clear sclera, ears normal, neck supple, throat clear without erythema or exudates  CVS:  RRR, S1 and S2 normal, no murmurs, rubs, gallops  Resp:  Lungs clear to auscultation, no wheezes, rales, rhonchi, cough  GI:  Abdomen soft, non-tender, non-distended, normoactive bowel sounds, no masses, Midline incisional hernia near umbilicus, reducible, large, non tender.   :      R groin - partially reducible very large Right Inguinal Hernia, nttp, testicle unremarkable  L groin -  + smaller LIH appreciated, reducible. nttp, testicle unremarkable  MSK:  No muscle atrophy, cyanosis, peripheral edema, full range of motion  Skin:  No rashes, ulcers, erythema  Neuro:  CNII-XII " grossly intact  Psych:  Alert and oriented to person, place, and time    Results:  I have independently reviewed all pertinent lab and radiologic studies relevant to general surgery.      VISIT DIAGNOSES:       ICD-10-CM ICD-9-CM   1. Recurrent right inguinal hernia  K40.91 550.91   2. Pre-operative examination  Z01.818 V72.84   3. Incisional hernia of anterior abdominal wall without obstruction or gangrene  K43.2 553.21   4. Calculus of gallbladder without cholecystitis without obstruction  K80.20 574.20   5. Left inguinal hernia  K40.90 550.90       ASSESSMENT/PLAN:     Impression:    1. Large complex recurrent right inguinal hernia.   2. Left inguinal hernia  3. Gallstone  4.  Incisional hernia to midline abdomen    Plan:  Open right inguinal hernia repair and open incisional abdominal hernia repair with mesh.  Expect surgery to be complicated due to size of hernias and contains bowel. Risk/benefits of surgery discussed with pt per Dr. Tierney.   Admit to [facility] for surgery, at least 1 night stay.   Surgery date scheduled for 2/26/24.   Defer cholecystectomy and LIH repair at this time, pt asymptomatic w this.  Pre op cardiac clearance.   Dr. Tierney examined patient, discussed recommendations, obtained informed consent, and answered all questions with patient/family.       I, MARSHA Bowen, am scribing for, and in the presence of, Jean Tierney MD, performed the above scribed service and the documentation accurately describes the services I performed. I attest to the accuracy of the note.      MARSHA Ramos

## 2024-02-14 NOTE — PROGRESS NOTES
"Pre op clinical information for upcoming inguinal and incisional hernia repair scheduled for 2/26/24 [facility].    Clinical information from last office visit 1/4/24:    Mr. Tashi Deluna is a 63 y.o. male who presents to clinic for surgical evaluation of recurrent right inguinal hernia and abdominal bulge and to discuss recent CT results.      C/o large bulge in his right groin for many years, increasing in size gradually.  It occasionally causes some pain and discomfort. Also has bulge to abdominal wall, present for many years.   Denies any changes to bowel / bladder habits. He denies CP/SOB or fever/chills.      Open right inguinal hernia repair as a child, age 7.      Previous surgery for " gastric ulcer repair" per Dr. Tierney, no op report available.      Positive symptoms:   Groin Pain right, Testicular Pain right, and Groin bulge      CT abd pelvis w contrast 12/19/23:     Narrative & Impression  EXAMINATION:  CT ABDOMEN PELVIS WITH IV CONTRAST     CLINICAL HISTORY:  Abdominal pain, post-op;Right inguinal hernia and abdominal wall hernia; Ventral hernia without obstruction or gangrene     TECHNIQUE:  Low dose axial images, sagittal and coronal reformations were obtained from the lung bases to the pubic symphysis following the IV administration of contrast. Automatic exposure control (AEC) is utilized to reduce patient radiation exposure.     COMPARISON:  09/20/2020     FINDINGS:  The lung bases are clear.     The liver appears normal.  There is a focal enhancing lesion seen in the dome of the liver on image 28 series 2.  Similar changes were seen previously.  Findings most likely represent a hemangioma..  Portal and hepatic veins appear normal.     There is a large gallstone in the gallbladder..  Mild pericholecystic fluid seen.  There is some mild intra and extrahepatic biliary dilatation.  If gallbladder pathology is suspected ultrasound correlation is recommended.     The pancreas appears normal.  No " pancreatic mass or lesion is seen.     The spleen shows no acute abnormality.     The adrenal glands appear normal.  No adrenal nodule is seen.     The kidneys appear normal.  No hydronephrosis is seen.  No hydroureter is seen.  No nephrolithiasis is seen.  No obvious ureteral stones are seen.     Urinary bladder appears grossly unremarkable.     There is an anterior abdominal wall ventral hernia seen containing a loop of small bowel.  This was seen on the prior examination as well.  Patient seems have undergone a previous ventral hernia repair surgery as well.  No obstructive changes are seen.     There is a large right inguinal hernia seen containing multiple loops of small bowel and omentum as well as mesenteric vessels.  This was seen on the prior examination as well but appears significantly larger today.  No obstruction is seen.     There is a small left inguinal hernia seen containing some fat and some vessels.  This was seen on the prior examination as well.     There are dilated pelvic veins seen on the left side from internal iliac vein branches and a large venous varix seen in the pelvis.  Similar changes were seen on the prior examination but they appear more prominent today.     Impression:     Large right inguinal hernia containing multiple loops of small bowel and mesenteric vasculature and omentum.  No obstruction is seen     Anterior abdominal wall ventral hernia containing a loop small bowel but no obstruction is seen.     Small left inguinal hernia contains some fat and vessels     Dilated vein seen in the left deep pelvis.  Etiology is uncertain.  Similar changes were seen previously but there appear more prominent today.     Gallstone with small amount of pericholecystic fluid and some intra and extrahepatic biliary dilatation seen.  If gallbladder pathology is suspected ultrasound correlation is recommended     Focal enhancing lesion in the dome of the liver consistent with likely meningioma.   Findings are stable since prior examination       PCP: Guevara Torres MD

## 2024-02-16 RX ORDER — BACLOFEN 20 MG
1 TABLET ORAL DAILY
COMMUNITY

## 2024-02-16 RX ORDER — CHOLECALCIFEROL (VITAMIN D3) 25 MCG
1000 TABLET ORAL DAILY
COMMUNITY

## 2024-02-16 RX ORDER — DIPHENHYDRAMINE HCL 25 MG
25 TABLET ORAL DAILY PRN
COMMUNITY

## 2024-02-16 RX ORDER — OMEPRAZOLE 20 MG/1
20 CAPSULE, DELAYED RELEASE ORAL DAILY PRN
COMMUNITY

## 2024-02-21 ENCOUNTER — ANESTHESIA EVENT (OUTPATIENT)
Dept: SURGERY | Facility: HOSPITAL | Age: 64
DRG: 352 | End: 2024-02-21
Payer: MEDICARE

## 2024-02-23 NOTE — PRE-PROCEDURE INSTRUCTIONS
"[facility]: Outpatient Surgery  Preprocedure Check-In Instructions     Your arrival time for your surgery or procedure is __8;00____.  We ask patients to arrive about 2 hours before surgery to allow for enough time to review your health history & medications, start your IV, complete any outstanding labwork or tests, and meet your Anesthesiologist.    Expectations: "Because of inconsistent procedure completion times, an unexpected wait may occur. The Physicians would like you to be here to prepare in the event they run ahead of time. We will make you as comfortable as possible and keep you informed. We apologize in advance if this happens."    You will arrive at [facility], [address], [place].  Enter through the [place] entrance next to the Emergency Room, and come to the 6th floor to the Outpatient Surgery Department.     Visitory Policy:  You are allowed 2 adult visitors to be with you in the hospital. All hospital visitors should be in good current health.  No small children.     What to Bring:  Please have your ID, insurance cards, and all home medication bottles with you at check in.  Bring your CPAP machine if one is used at home.     Fasting:  Nothing to eat or drink after midnight the night before your procedure. This includes no ice, gum, hard candies, and/or tobacco products.  Follow your doctor's instructions for taking any medications on the morning of your procedure.  If no instructions for taking medications were given, do not take any medications but bring your medications in their bottles to your procedure check in.     Follow your doctor's preoperative instructions regarding skin prep, bowel prep, bathing, or showering prior to your procedure.  If any special soaps were provided to you, please use according to your doctor's instructions. If no instructions were given from your doctor, take a good bath or shower with antibacterial soap the night " before and the morning of your procedure.  On the morning of procedure, wear loose, comfortable clothing.  No lotions, makeup, perfumes, colognes, deodorant, or jewelry to your procedure.  Removable items (glasses, contact lenses, dentures, retainers, hearing aids) need to be removed for your procedure.  Bring your storage containers for these items if you wear them.     Artificial nails, body jewelry, eyelash extensions, and/or hair extensions with metal clips are not allowed during your surgery.  If you currently wear any of these items, please arrange for them to be removed prior to your arrival to the hospital.     Outpatient or Same Day Surgeries:  Any patients receiving sedation/anesthesia are advised not to drive for 24 hours after their procedure.  We do not allow patients to drive themselves home after discharge.  If you are going home after your procedure, please have someone available to drive you home from the hospital.        You may call the Outpatient Surgery Department at [phone] with any questions or concerns.  We are looking forward to meeting you and taking great care of you for your procedure.  Thank you for choosing [facility] for your surgical needs.

## 2024-02-26 ENCOUNTER — ANESTHESIA (OUTPATIENT)
Dept: SURGERY | Facility: HOSPITAL | Age: 64
DRG: 352 | End: 2024-02-26
Payer: MEDICARE

## 2024-02-26 ENCOUNTER — HOSPITAL ENCOUNTER (INPATIENT)
Facility: HOSPITAL | Age: 64
LOS: 1 days | Discharge: HOME | DRG: 352 | End: 2024-02-28
Attending: SURGERY | Admitting: SURGERY
Payer: MEDICARE

## 2024-02-26 DIAGNOSIS — K40.91 RECURRENT RIGHT INGUINAL HERNIA: ICD-10-CM

## 2024-02-26 LAB
GLUCOSE SERPL-MCNC: 217 MG/DL (ref 70–110)
POCT GLUCOSE: 195 MG/DL (ref 70–110)
POCT GLUCOSE: 208 MG/DL (ref 70–110)

## 2024-02-26 PROCEDURE — 25000003 PHARM REV CODE 250: Performed by: STUDENT IN AN ORGANIZED HEALTH CARE EDUCATION/TRAINING PROGRAM

## 2024-02-26 PROCEDURE — 36000707: Performed by: SURGERY

## 2024-02-26 PROCEDURE — 25000003 PHARM REV CODE 250: Performed by: NURSE ANESTHETIST, CERTIFIED REGISTERED

## 2024-02-26 PROCEDURE — 71000039 HC RECOVERY, EACH ADD'L HOUR: Performed by: SURGERY

## 2024-02-26 PROCEDURE — 0YU54JZ SUPPLEMENT RIGHT INGUINAL REGION WITH SYNTHETIC SUBSTITUTE, PERCUTANEOUS ENDOSCOPIC APPROACH: ICD-10-PCS | Performed by: SURGERY

## 2024-02-26 PROCEDURE — 49651 LAP ING HERNIA REPAIR RECUR: CPT | Mod: RT,,, | Performed by: SURGERY

## 2024-02-26 PROCEDURE — 25000003 PHARM REV CODE 250: Performed by: NURSE PRACTITIONER

## 2024-02-26 PROCEDURE — D9220A PRA ANESTHESIA: Mod: ANES,,, | Performed by: STUDENT IN AN ORGANIZED HEALTH CARE EDUCATION/TRAINING PROGRAM

## 2024-02-26 PROCEDURE — 82962 GLUCOSE BLOOD TEST: CPT | Performed by: SURGERY

## 2024-02-26 PROCEDURE — 71000016 HC POSTOP RECOV ADDL HR: Performed by: SURGERY

## 2024-02-26 PROCEDURE — 71000015 HC POSTOP RECOV 1ST HR: Performed by: SURGERY

## 2024-02-26 PROCEDURE — 63600175 PHARM REV CODE 636 W HCPCS: Performed by: STUDENT IN AN ORGANIZED HEALTH CARE EDUCATION/TRAINING PROGRAM

## 2024-02-26 PROCEDURE — C1781 MESH (IMPLANTABLE): HCPCS | Performed by: SURGERY

## 2024-02-26 PROCEDURE — 71000033 HC RECOVERY, INTIAL HOUR: Performed by: SURGERY

## 2024-02-26 PROCEDURE — 37000008 HC ANESTHESIA 1ST 15 MINUTES: Performed by: SURGERY

## 2024-02-26 PROCEDURE — C1727 CATH, BAL TIS DIS, NON-VAS: HCPCS | Performed by: SURGERY

## 2024-02-26 PROCEDURE — 63600175 PHARM REV CODE 636 W HCPCS: Performed by: NURSE PRACTITIONER

## 2024-02-26 PROCEDURE — D9220A PRA ANESTHESIA: Mod: CRNA,,, | Performed by: NURSE ANESTHETIST, CERTIFIED REGISTERED

## 2024-02-26 PROCEDURE — 49651 LAP ING HERNIA REPAIR RECUR: CPT | Mod: AS,RT,, | Performed by: NURSE PRACTITIONER

## 2024-02-26 PROCEDURE — 37000009 HC ANESTHESIA EA ADD 15 MINS: Performed by: SURGERY

## 2024-02-26 PROCEDURE — 63600175 PHARM REV CODE 636 W HCPCS: Performed by: NURSE ANESTHETIST, CERTIFIED REGISTERED

## 2024-02-26 PROCEDURE — 27201423 OPTIME MED/SURG SUP & DEVICES STERILE SUPPLY: Performed by: SURGERY

## 2024-02-26 PROCEDURE — 36000706: Performed by: SURGERY

## 2024-02-26 PROCEDURE — G0378 HOSPITAL OBSERVATION PER HR: HCPCS

## 2024-02-26 DEVICE — BARD 3DMAX MESH RIGHT EXTRA LARGE
Type: IMPLANTABLE DEVICE | Site: INGUINAL | Status: FUNCTIONAL
Brand: BARD 3DMAX MESH

## 2024-02-26 RX ORDER — PROPOFOL 10 MG/ML
VIAL (ML) INTRAVENOUS
Status: DISCONTINUED | OUTPATIENT
Start: 2024-02-26 | End: 2024-02-26

## 2024-02-26 RX ORDER — SODIUM CHLORIDE 0.9 % (FLUSH) 0.9 %
10 SYRINGE (ML) INJECTION
Status: DISCONTINUED | OUTPATIENT
Start: 2024-02-26 | End: 2024-02-26 | Stop reason: HOSPADM

## 2024-02-26 RX ORDER — ONDANSETRON HYDROCHLORIDE 2 MG/ML
INJECTION, SOLUTION INTRAVENOUS
Status: DISCONTINUED | OUTPATIENT
Start: 2024-02-26 | End: 2024-02-26

## 2024-02-26 RX ORDER — MIDAZOLAM HYDROCHLORIDE 1 MG/ML
2 INJECTION INTRAMUSCULAR; INTRAVENOUS
Status: ACTIVE | OUTPATIENT
Start: 2024-02-26

## 2024-02-26 RX ORDER — FAMOTIDINE 10 MG/1
10 TABLET ORAL 2 TIMES DAILY
COMMUNITY

## 2024-02-26 RX ORDER — CELECOXIB 200 MG/1
200 CAPSULE ORAL
Status: COMPLETED | OUTPATIENT
Start: 2024-02-26 | End: 2024-02-26

## 2024-02-26 RX ORDER — ACETAMINOPHEN 500 MG
1000 TABLET ORAL
Status: COMPLETED | OUTPATIENT
Start: 2024-02-26 | End: 2024-02-26

## 2024-02-26 RX ORDER — GABAPENTIN 300 MG/1
300 CAPSULE ORAL
Status: COMPLETED | OUTPATIENT
Start: 2024-02-26 | End: 2024-02-26

## 2024-02-26 RX ORDER — PROMETHAZINE HYDROCHLORIDE 12.5 MG/1
12.5 TABLET ORAL EVERY 6 HOURS PRN
Status: DISCONTINUED | OUTPATIENT
Start: 2024-02-26 | End: 2024-02-28 | Stop reason: HOSPADM

## 2024-02-26 RX ORDER — LIDOCAINE HYDROCHLORIDE 20 MG/ML
INJECTION, SOLUTION EPIDURAL; INFILTRATION; INTRACAUDAL; PERINEURAL
Status: DISCONTINUED | OUTPATIENT
Start: 2024-02-26 | End: 2024-02-26

## 2024-02-26 RX ORDER — ONDANSETRON HYDROCHLORIDE 2 MG/ML
4 INJECTION, SOLUTION INTRAVENOUS EVERY 6 HOURS PRN
Status: DISCONTINUED | OUTPATIENT
Start: 2024-02-26 | End: 2024-02-28 | Stop reason: HOSPADM

## 2024-02-26 RX ORDER — SODIUM CHLORIDE, SODIUM LACTATE, POTASSIUM CHLORIDE, CALCIUM CHLORIDE 600; 310; 30; 20 MG/100ML; MG/100ML; MG/100ML; MG/100ML
INJECTION, SOLUTION INTRAVENOUS CONTINUOUS
Status: ACTIVE | OUTPATIENT
Start: 2024-02-26

## 2024-02-26 RX ORDER — ONDANSETRON 4 MG/1
4 TABLET, ORALLY DISINTEGRATING ORAL
Status: COMPLETED | OUTPATIENT
Start: 2024-02-26 | End: 2024-02-26

## 2024-02-26 RX ORDER — MORPHINE SULFATE 10 MG/ML
2 INJECTION INTRAMUSCULAR; INTRAVENOUS; SUBCUTANEOUS
Status: DISCONTINUED | OUTPATIENT
Start: 2024-02-26 | End: 2024-02-28 | Stop reason: HOSPADM

## 2024-02-26 RX ORDER — LEVOFLOXACIN 5 MG/ML
500 INJECTION, SOLUTION INTRAVENOUS
Status: DISPENSED | OUTPATIENT
Start: 2024-02-26

## 2024-02-26 RX ORDER — ROCURONIUM BROMIDE 10 MG/ML
INJECTION, SOLUTION INTRAVENOUS
Status: DISCONTINUED | OUTPATIENT
Start: 2024-02-26 | End: 2024-02-26

## 2024-02-26 RX ORDER — MEPERIDINE HYDROCHLORIDE 25 MG/ML
50 INJECTION INTRAMUSCULAR; INTRAVENOUS; SUBCUTANEOUS ONCE AS NEEDED
Status: ACTIVE | OUTPATIENT
Start: 2024-02-26 | End: 2024-02-27

## 2024-02-26 RX ORDER — TRAMADOL HYDROCHLORIDE 50 MG/1
50 TABLET ORAL EVERY 4 HOURS PRN
Status: DISCONTINUED | OUTPATIENT
Start: 2024-02-26 | End: 2024-02-28 | Stop reason: HOSPADM

## 2024-02-26 RX ORDER — HYDROMORPHONE HYDROCHLORIDE 2 MG/ML
0.4 INJECTION, SOLUTION INTRAMUSCULAR; INTRAVENOUS; SUBCUTANEOUS EVERY 5 MIN PRN
Status: DISCONTINUED | OUTPATIENT
Start: 2024-02-26 | End: 2024-02-26 | Stop reason: HOSPADM

## 2024-02-26 RX ORDER — ENOXAPARIN SODIUM 100 MG/ML
40 INJECTION SUBCUTANEOUS DAILY
Status: DISCONTINUED | OUTPATIENT
Start: 2024-02-26 | End: 2024-02-28 | Stop reason: HOSPADM

## 2024-02-26 RX ORDER — IBUPROFEN 200 MG
24 TABLET ORAL
Status: DISCONTINUED | OUTPATIENT
Start: 2024-02-26 | End: 2024-02-28 | Stop reason: HOSPADM

## 2024-02-26 RX ORDER — SODIUM CHLORIDE, SODIUM LACTATE, POTASSIUM CHLORIDE, CALCIUM CHLORIDE 600; 310; 30; 20 MG/100ML; MG/100ML; MG/100ML; MG/100ML
INJECTION, SOLUTION INTRAVENOUS CONTINUOUS
Status: DISCONTINUED | OUTPATIENT
Start: 2024-02-26 | End: 2024-02-28 | Stop reason: HOSPADM

## 2024-02-26 RX ORDER — IBUPROFEN 200 MG
16 TABLET ORAL
Status: DISCONTINUED | OUTPATIENT
Start: 2024-02-26 | End: 2024-02-28 | Stop reason: HOSPADM

## 2024-02-26 RX ORDER — INSULIN ASPART 100 [IU]/ML
1-10 INJECTION, SOLUTION INTRAVENOUS; SUBCUTANEOUS
Status: DISCONTINUED | OUTPATIENT
Start: 2024-02-26 | End: 2024-02-28 | Stop reason: HOSPADM

## 2024-02-26 RX ORDER — FENTANYL CITRATE 50 UG/ML
INJECTION, SOLUTION INTRAMUSCULAR; INTRAVENOUS
Status: DISCONTINUED | OUTPATIENT
Start: 2024-02-26 | End: 2024-02-26

## 2024-02-26 RX ORDER — HYDROCODONE BITARTRATE AND ACETAMINOPHEN 7.5; 325 MG/1; MG/1
1 TABLET ORAL EVERY 6 HOURS PRN
Status: DISCONTINUED | OUTPATIENT
Start: 2024-02-26 | End: 2024-02-28 | Stop reason: HOSPADM

## 2024-02-26 RX ORDER — MIDAZOLAM HYDROCHLORIDE 1 MG/ML
INJECTION INTRAMUSCULAR; INTRAVENOUS
Status: DISCONTINUED | OUTPATIENT
Start: 2024-02-26 | End: 2024-02-26

## 2024-02-26 RX ORDER — PHENYLEPHRINE HCL IN 0.9% NACL 1 MG/10 ML
SYRINGE (ML) INTRAVENOUS
Status: DISCONTINUED | OUTPATIENT
Start: 2024-02-26 | End: 2024-02-26

## 2024-02-26 RX ORDER — GLUCAGON 1 MG
1 KIT INJECTION
Status: DISCONTINUED | OUTPATIENT
Start: 2024-02-26 | End: 2024-02-28 | Stop reason: HOSPADM

## 2024-02-26 RX ORDER — PROCHLORPERAZINE EDISYLATE 5 MG/ML
5 INJECTION INTRAMUSCULAR; INTRAVENOUS EVERY 6 HOURS PRN
Status: DISCONTINUED | OUTPATIENT
Start: 2024-02-26 | End: 2024-02-28 | Stop reason: HOSPADM

## 2024-02-26 RX ADMIN — SODIUM CHLORIDE, POTASSIUM CHLORIDE, SODIUM LACTATE AND CALCIUM CHLORIDE: 600; 310; 30; 20 INJECTION, SOLUTION INTRAVENOUS at 10:02

## 2024-02-26 RX ADMIN — ONDANSETRON 4 MG: 2 INJECTION INTRAMUSCULAR; INTRAVENOUS at 02:02

## 2024-02-26 RX ADMIN — Medication 100 MCG: at 02:02

## 2024-02-26 RX ADMIN — ACETAMINOPHEN 1000 MG: 500 TABLET ORAL at 08:02

## 2024-02-26 RX ADMIN — SODIUM CHLORIDE, SODIUM GLUCONATE, SODIUM ACETATE, POTASSIUM CHLORIDE AND MAGNESIUM CHLORIDE: 526; 502; 368; 37; 30 INJECTION, SOLUTION INTRAVENOUS at 12:02

## 2024-02-26 RX ADMIN — MIDAZOLAM HYDROCHLORIDE 2 MG: 1 INJECTION, SOLUTION INTRAMUSCULAR; INTRAVENOUS at 12:02

## 2024-02-26 RX ADMIN — Medication 100 MCG: at 12:02

## 2024-02-26 RX ADMIN — CELECOXIB 200 MG: 200 CAPSULE ORAL at 08:02

## 2024-02-26 RX ADMIN — GABAPENTIN 300 MG: 300 CAPSULE ORAL at 08:02

## 2024-02-26 RX ADMIN — ONDANSETRON 4 MG: 4 TABLET, ORALLY DISINTEGRATING ORAL at 08:02

## 2024-02-26 RX ADMIN — SUGAMMADEX 200 MG: 100 INJECTION, SOLUTION INTRAVENOUS at 02:02

## 2024-02-26 RX ADMIN — LEVOFLOXACIN 500 MG: 500 INJECTION, SOLUTION INTRAVENOUS at 12:02

## 2024-02-26 RX ADMIN — PHENYLEPHRINE HYDROCHLORIDE 50 MCG/MIN: 10 INJECTION INTRAVENOUS at 02:02

## 2024-02-26 RX ADMIN — PROPOFOL 110 MG: 10 INJECTION, EMULSION INTRAVENOUS at 12:02

## 2024-02-26 RX ADMIN — FENTANYL CITRATE 100 MCG: 50 INJECTION, SOLUTION INTRAMUSCULAR; INTRAVENOUS at 12:02

## 2024-02-26 RX ADMIN — HYDROMORPHONE HYDROCHLORIDE 0.4 MG: 2 INJECTION, SOLUTION INTRAMUSCULAR; INTRAVENOUS; SUBCUTANEOUS at 06:02

## 2024-02-26 RX ADMIN — LIDOCAINE HYDROCHLORIDE 80 MG: 20 INJECTION, SOLUTION EPIDURAL; INFILTRATION; INTRACAUDAL; PERINEURAL at 12:02

## 2024-02-26 RX ADMIN — ROCURONIUM BROMIDE 50 MG: 10 SOLUTION INTRAVENOUS at 12:02

## 2024-02-26 RX ADMIN — FENTANYL CITRATE 100 MCG: 50 INJECTION, SOLUTION INTRAMUSCULAR; INTRAVENOUS at 01:02

## 2024-02-26 RX ADMIN — PROPOFOL 50 MG: 10 INJECTION, EMULSION INTRAVENOUS at 01:02

## 2024-02-26 NOTE — TRANSFER OF CARE
"Anesthesia Transfer of Care Note    Patient: Tashi Deluna    Procedure(s) Performed: Procedure(s) (LRB):  REPAIR, HERNIA, INCISIONAL  REPAIR, HERNIA, INGUINAL (Right)    Patient location: PACU    Anesthesia Type: general    Transport from OR: Transported from OR on room air with adequate spontaneous ventilation    Post pain: adequate analgesia    Post assessment: no apparent anesthetic complications and tolerated procedure well    Post vital signs: stable    Level of consciousness: responds to stimulation    Nausea/Vomiting: no nausea/vomiting    Complications: none    Transfer of care protocol was followed      Last vitals: Visit Vitals  BP (!) 135/94   Pulse 80   Temp 36.7 °C (98.1 °F) (Oral)   Resp 17   Ht 5' 8" (1.727 m)   Wt 66.4 kg (146 lb 6.2 oz)   SpO2 99%   BMI 22.26 kg/m²     "

## 2024-02-26 NOTE — BRIEF OP NOTE
[facility] - Periop Services  Brief Operative Note    SUMMARY     Surgery Date: 2/26/2024     Surgeon(s) and Role:  Panel 1:     * Jean Tierney MD       Assist: CRISTIANE Rogel NP    Pre-op Diagnosis:  Recurrent right inguinal hernia [K40.91]  Incisional hernia of anterior abdominal wall without obstruction or gangrene [K43.2]    Post-op Diagnosis:  Post-Op Diagnosis Codes:     * Recurrent right inguinal hernia [K40.91]     * Incisional hernia of anterior abdominal wall without obstruction or gangrene [K43.2]    Procedure: Diagnostic laparoscopy, Laparoscopic reduction of right inguinal hernia (large, incarcerated), and Laparoscopic right inguinal hernia repair with mesh TEPP    Anesthesia: General    Implants:  Implant Name Type Inv. Item Serial No.  Lot No. LRB No. Used Action   MESH HERNIA 3DMAX XLG 5X7 RT - [id number]  MESH HERNIA 3DMAX XLG 5X7 RT  [employer] [id number] Right 1 Implanted       Operative Findings: large RIH, large midline incisional hernia     Estimated Blood Loss: 20 cc    Estimated Blood Loss has been documented.         Specimens:   Specimen (24h ago, onward)      None            [id number]

## 2024-02-26 NOTE — TRANSFER OF CARE
"Anesthesia Transfer of Care Note    Patient: Tashi Deluna    Procedure(s) Performed: Procedure(s) (LRB):  REPAIR, HERNIA, INCISIONAL  REPAIR, HERNIA, INGUINAL (Right)    Patient location: PACU    Anesthesia Type: general    Transport from OR: Transported from OR on 6-10 L/min O2 by face mask with adequate spontaneous ventilation    Post pain: adequate analgesia    Post assessment: no apparent anesthetic complications    Post vital signs: stable    Level of consciousness: awake and sedated    Nausea/Vomiting: no nausea/vomiting    Complications: none    Transfer of care protocol was followed      Last vitals: Visit Vitals  BP (!) 134/97   Pulse 84   Temp 36.7 °C (98.1 °F) (Oral)   Resp (!) 8   Ht 5' 8" (1.727 m)   Wt 66.4 kg (146 lb 6.2 oz)   SpO2 98%   BMI 22.26 kg/m²     "

## 2024-02-26 NOTE — ANESTHESIA PREPROCEDURE EVALUATION
02/26/2024  Tashi Deluna is a 63 y.o., male.    No specialty history recorded    Other Medical History   Atrial fibrillation CHF (congestive heart failure)   SOB (shortness of breath) Fatigue   Type 1 diabetes mellitus GERD (gastroesophageal reflux disease)   Inguinal hernia, bilateral Dysphagia   History of acute pancreatitis Incisional hernia of anterior abdominal wall without obstruction or gangrene   Weakness Neuropathy, diabetic   History of anxiety      Surgical History  INSERTION OF PACEMAKER Gastric Ulcer Sx   HERNIA REPAIR CARDIAC ELECTROPHYSIOLOGY STUDY AND ABLATION   CARDIOVERSION COLONOSCOPY   ABLATION      Summary       The left ventricle is normal in size with mild concentric hypertrophy and mildly decreased systolic function.  The estimated ejection fraction is 48%.  Grade II left ventricular diastolic dysfunction.  Mild right ventricular enlargement with low normal right ventricular systolic function.  Mild mitral regurgitation.  Mild tricuspid regurgitation.     Echo density protruding from septum in LV. It does not resemble a thrombus.    BiV ICD, v-paced rhythm, will need magnet to prevent shock in setting of electrocautery.  Magnet will not facilitate asynchronous mode of pacer, so will communicate to surgical team that short bursts of bipolar electrocautery will be necessary to avoid interference with paced rhythm at baseline.         Pre-op Assessment    I have reviewed the Patient Summary Reports.     I have reviewed the Nursing Notes. I have reviewed the NPO Status.   I have reviewed the Medications.     Review of Systems  Anesthesia Hx:  No problems with previous Anesthesia                Social:  Non-Smoker       Hematology/Oncology:  Hematology Normal   Oncology Normal                                   EENT/Dental:  EENT/Dental Normal           Cardiovascular:  Exercise tolerance:  good  Pacemaker (PPM) Hypertension    Dysrhythmias atrial fibrillation  CHF         Functional Capacity good / => 4 METS                         Pulmonary:      Shortness of breath                  Renal/:  Renal/ Normal                 Hepatic/GI:     GERD             Musculoskeletal:  Musculoskeletal Normal                Neurological:  Neurology Normal                                      Endocrine:  Diabetes           Dermatological:  Skin Normal    Psych:  Psychiatric Normal                    Physical Exam  General: Well nourished, Cooperative and Alert    Airway:  Mallampati: II   Mouth Opening: Normal  TM Distance: Normal  Tongue: Normal    Chest/Lungs:  Normal Respiratory Rate        Anesthesia Plan  Type of Anesthesia, risks & benefits discussed:    Anesthesia Type: Gen ETT  Intra-op Monitoring Plan: Standard ASA Monitors  Post Op Pain Control Plan: multimodal analgesia  Induction:  IV  Informed Consent: Informed consent signed with the Patient and all parties understand the risks and agree with anesthesia plan.  All questions answered.   ASA Score: 3  Day of Surgery Review of History & Physical: H&P Update referred to the surgeon/provider.    Ready For Surgery From Anesthesia Perspective.     .

## 2024-02-26 NOTE — H&P
"[facility] - Periop Services  General Surgery  History & Physical    Patient Name: Tashi Deluna  MRN: [id number]  Admission Date: 2/26/2024  Attending Physician: Jean Tierney MD   Primary Care Provider: Guevara Torres MD    Patient information was obtained from patient and past medical records.     Subjective:     Chief Complaint/Reason for Admission: Inguinal hernia and Incisional hernia     History of Present Illness:  No changes in medical history from recent office visit. Ready to proceed with hernia repairs today as listed.        Pre op clinical information for upcoming inguinal and incisional hernia repair scheduled for 2/26/24 [facility].     Clinical information from last office visit 1/4/24:     Mr. Tashi Deluna is a 63 y.o. male who presents to clinic for surgical evaluation of recurrent right inguinal hernia and abdominal bulge and to discuss recent CT results.      C/o large bulge in his right groin for many years, increasing in size gradually.  It occasionally causes some pain and discomfort. Also has bulge to abdominal wall, present for many years.   Denies any changes to bowel / bladder habits. He denies CP/SOB or fever/chills.      Open right inguinal hernia repair as a child, age 7.      Previous surgery for " gastric ulcer repair" per Dr. Tierney, no op report available.      Positive symptoms:   Groin Pain right, Testicular Pain right, and Groin bulge      CT abd pelvis w contrast 12/19/23:     Narrative & Impression  EXAMINATION:  CT ABDOMEN PELVIS WITH IV CONTRAST     CLINICAL HISTORY:  Abdominal pain, post-op;Right inguinal hernia and abdominal wall hernia; Ventral hernia without obstruction or gangrene     TECHNIQUE:  Low dose axial images, sagittal and coronal reformations were obtained from the lung bases to the pubic symphysis following the IV administration of contrast. Automatic exposure control (AEC) is utilized to reduce patient radiation exposure.   "   COMPARISON:  09/20/2020     FINDINGS:  The lung bases are clear.     The liver appears normal.  There is a focal enhancing lesion seen in the dome of the liver on image 28 series 2.  Similar changes were seen previously.  Findings most likely represent a hemangioma..  Portal and hepatic veins appear normal.     There is a large gallstone in the gallbladder..  Mild pericholecystic fluid seen.  There is some mild intra and extrahepatic biliary dilatation.  If gallbladder pathology is suspected ultrasound correlation is recommended.     The pancreas appears normal.  No pancreatic mass or lesion is seen.     The spleen shows no acute abnormality.     The adrenal glands appear normal.  No adrenal nodule is seen.     The kidneys appear normal.  No hydronephrosis is seen.  No hydroureter is seen.  No nephrolithiasis is seen.  No obvious ureteral stones are seen.     Urinary bladder appears grossly unremarkable.     There is an anterior abdominal wall ventral hernia seen containing a loop of small bowel.  This was seen on the prior examination as well.  Patient seems have undergone a previous ventral hernia repair surgery as well.  No obstructive changes are seen.     There is a large right inguinal hernia seen containing multiple loops of small bowel and omentum as well as mesenteric vessels.  This was seen on the prior examination as well but appears significantly larger today.  No obstruction is seen.     There is a small left inguinal hernia seen containing some fat and some vessels.  This was seen on the prior examination as well.     There are dilated pelvic veins seen on the left side from internal iliac vein branches and a large venous varix seen in the pelvis.  Similar changes were seen on the prior examination but they appear more prominent today.     Impression:     Large right inguinal hernia containing multiple loops of small bowel and mesenteric vasculature and omentum.  No obstruction is seen     Anterior  abdominal wall ventral hernia containing a loop small bowel but no obstruction is seen.     Small left inguinal hernia contains some fat and vessels     Dilated vein seen in the left deep pelvis.  Etiology is uncertain.  Similar changes were seen previously but there appear more prominent today.     Gallstone with small amount of pericholecystic fluid and some intra and extrahepatic biliary dilatation seen.  If gallbladder pathology is suspected ultrasound correlation is recommended     Focal enhancing lesion in the dome of the liver consistent with likely meningioma.  Findings are stable since prior examination        PCP: Guevara Torres MD              No current facility-administered medications on file prior to encounter.     Current Outpatient Medications on File Prior to Encounter   Medication Sig    diphenhydrAMINE (BENADRYL ALLERGY) 25 mg tablet Take 25 mg by mouth daily as needed for Allergies.    ELIQUIS 5 mg Tab Take 5 mg by mouth 2 (two) times daily.    ENTRESTO 24-26 mg per tablet Take 1 tablet by mouth 2 (two) times daily.    insulin regular 100 unit/mL Inj injection Inject into the skin 3 (three) times daily before meals. Per sliding scale    LANTUS U-100 INSULIN 100 unit/mL injection Inject 10 Units into the skin Daily. Per sliding scale    magnesium oxide 500 mg Tab Take 1 tablet by mouth once daily.    metoprolol succinate (TOPROL-XL) 100 MG 24 hr tablet Take 100 mg by mouth once daily.    omeprazole (PRILOSEC) 20 MG capsule Take 20 mg by mouth daily as needed (acid reflux).    rosuvastatin (CRESTOR) 10 MG tablet Take 10 mg by mouth once daily.    spironolactone (ALDACTONE) 25 MG tablet Take 12.5 mg by mouth once daily.    cyanocobalamin, vitamin B-12, (VITAMIN B-12 ORAL) Take by mouth.    ergocalciferol, vitamin D2, (VITAMIN D ORAL) Take by mouth.    pantoprazole (PROTONIX) 40 MG tablet Take 40 mg by mouth once daily.    vitamin D (VITAMIN D3) 1000 units Tab Take 1,000 Units by mouth once  daily.    [DISCONTINUED] amiodarone (PACERONE) 400 MG tablet Amiodarone 200mg tablets 400mg twice daily x 3 days then 200mg twice daily x 3 days then 200mg daily (Patient taking differently: once daily. Amiodarone 200mg tablets 400mg twice daily x 3 days then 200mg twice daily x 3 days then 200mg daily)       Review of patient's allergies indicates:   Allergen Reactions    Amoxicillin Rash       Past Medical History:   Diagnosis Date    Atrial fibrillation     CHF (congestive heart failure)     Dysphagia     Fatigue     GERD (gastroesophageal reflux disease)     History of acute pancreatitis     History of anxiety     Incisional hernia of anterior abdominal wall without obstruction or gangrene     Inguinal hernia, bilateral     Neuropathy, diabetic     SOB (shortness of breath)     Type 1 diabetes mellitus     Weakness      Past Surgical History:   Procedure Laterality Date    ABLATION N/A 2022    Procedure: ABLATION;  Surgeon: Kin Hubbard MD;  Location: [facility] CATH LAB;  Service: Cardiology;  Laterality: N/A;  AV NODE ABLATION W/ ANEST.    CARDIAC ELECTROPHYSIOLOGY STUDY AND ABLATION      CARDIOVERSION      COLONOSCOPY      Gastric Ulcer Sx      Dr. aguiar    HERNIA REPAIR Right     Open (6 years old)    INSERTION OF PACEMAKER  2022     Family History       Problem Relation (Age of Onset)    ALS Mother          Tobacco Use    Smoking status: Former     Current packs/day: 0.00     Types: Cigarettes     Quit date:      Years since quittin.1    Smokeless tobacco: Never   Substance and Sexual Activity    Alcohol use: Never    Drug use: Never    Sexual activity: Not on file     Review of Systems   Constitutional: Negative.    HENT: Negative.     Eyes: Negative.    Respiratory: Negative.     Cardiovascular: Negative.    Gastrointestinal: Negative.         + hernia   Endocrine: Negative.    Genitourinary: Negative.    Musculoskeletal: Negative.    Skin: Negative.    Allergic/Immunologic:  Negative.    Neurological: Negative.    Psychiatric/Behavioral: Negative.     All other systems reviewed and are negative.    Objective:     Vital Signs (Most Recent):    Vital Signs (24h Range):        Weight: 74.8 kg (165 lb)  Body mass index is 25.09 kg/m².    Physical Exam  Vitals reviewed.   Constitutional:       General: He is not in acute distress.     Appearance: Normal appearance.   HENT:      Head: Normocephalic.   Eyes:      Conjunctiva/sclera: Conjunctivae normal.      Pupils: Pupils are equal, round, and reactive to light.   Cardiovascular:      Rate and Rhythm: Normal rate and regular rhythm.      Pulses: Normal pulses.      Heart sounds: Normal heart sounds.   Pulmonary:      Effort: Pulmonary effort is normal. No respiratory distress.      Breath sounds: Normal breath sounds.   Abdominal:      General: Abdomen is flat. Bowel sounds are normal.      Palpations: Abdomen is soft.      Tenderness: There is no abdominal tenderness.      Hernia: A hernia is present.   Musculoskeletal:         General: Normal range of motion.      Cervical back: Neck supple.   Skin:     General: Skin is warm and dry.   Neurological:      General: No focal deficit present.      Mental Status: He is alert and oriented to person, place, and time.   Psychiatric:         Mood and Affect: Mood normal.         Behavior: Behavior normal.         Significant Labs:  I have reviewed all pertinent lab results within the past 24 hours.    Significant Diagnostics:  I have reviewed all pertinent imaging results/findings within the past 24 hours.    Assessment/Plan:     Active Diagnoses:    Diagnosis Date Noted POA    PRINCIPAL PROBLEM:  Recurrent right inguinal hernia [K40.91] 12/05/2023 Yes    Calculus of gallbladder without cholecystitis without obstruction [K80.20] 01/04/2024 Yes    Left inguinal hernia [K40.90] 01/04/2024 Yes    Incisional hernia of anterior abdominal wall without obstruction or gangrene [K43.2] 12/05/2023 Yes       Problems Resolved During this Admission:     VTE Risk Mitigation (From admission, onward)      None          Recurrent right inguinal hernia repair and incisional hernia repair, lap vs open,and other indicated procedures.     Dr. Tierney examined patient, discussed recommendations, obtained informed consent, and marked op site.   Ana María Rogel, ANP  General Surgery  [facility] - [facility]

## 2024-02-27 LAB
ALBUMIN SERPL-MCNC: 3.3 G/DL (ref 3.4–4.8)
ALBUMIN/GLOB SERPL: 1.5 RATIO (ref 1.1–2)
ALP SERPL-CCNC: 55 UNIT/L (ref 40–150)
ALT SERPL-CCNC: 6 UNIT/L (ref 0–55)
AST SERPL-CCNC: 12 UNIT/L (ref 5–34)
BASOPHILS # BLD AUTO: 0.01 X10(3)/MCL
BASOPHILS NFR BLD AUTO: 0.1 %
BILIRUB SERPL-MCNC: 3.9 MG/DL
BUN SERPL-MCNC: 12.9 MG/DL (ref 8.4–25.7)
CALCIUM SERPL-MCNC: 8.3 MG/DL (ref 8.8–10)
CHLORIDE SERPL-SCNC: 103 MMOL/L (ref 98–107)
CO2 SERPL-SCNC: 21 MMOL/L (ref 23–31)
CREAT SERPL-MCNC: 0.74 MG/DL (ref 0.73–1.18)
EOSINOPHIL # BLD AUTO: 0 X10(3)/MCL (ref 0–0.9)
EOSINOPHIL NFR BLD AUTO: 0 %
ERYTHROCYTE [DISTWIDTH] IN BLOOD BY AUTOMATED COUNT: 13.4 % (ref 11.5–17)
GFR SERPLBLD CREATININE-BSD FMLA CKD-EPI: >60 MLS/MIN/1.73/M2
GLOBULIN SER-MCNC: 2.2 GM/DL (ref 2.4–3.5)
GLUCOSE P FAST SERPL-MCNC: 274 MG/DL (ref 70–100)
GLUCOSE SERPL-MCNC: 275 MG/DL (ref 82–115)
HCT VFR BLD AUTO: 39.1 % (ref 42–52)
HGB BLD-MCNC: 13.7 G/DL (ref 14–18)
IMM GRANULOCYTES # BLD AUTO: 0.02 X10(3)/MCL (ref 0–0.04)
IMM GRANULOCYTES NFR BLD AUTO: 0.3 %
LYMPHOCYTES # BLD AUTO: 0.9 X10(3)/MCL (ref 0.6–4.6)
LYMPHOCYTES NFR BLD AUTO: 11.6 %
MCH RBC QN AUTO: 30.2 PG (ref 27–31)
MCHC RBC AUTO-ENTMCNC: 35 G/DL (ref 33–36)
MCV RBC AUTO: 86.1 FL (ref 80–94)
MONOCYTES # BLD AUTO: 0.51 X10(3)/MCL (ref 0.1–1.3)
MONOCYTES NFR BLD AUTO: 6.6 %
NEUTROPHILS # BLD AUTO: 6.33 X10(3)/MCL (ref 2.1–9.2)
NEUTROPHILS NFR BLD AUTO: 81.4 %
NRBC BLD AUTO-RTO: 0 %
PLATELET # BLD AUTO: 148 X10(3)/MCL (ref 130–400)
PLATELETS.RETICULATED NFR BLD AUTO: 5.7 % (ref 0.9–11.2)
PMV BLD AUTO: 10.6 FL (ref 7.4–10.4)
POCT GLUCOSE: 217 MG/DL (ref 70–110)
POCT GLUCOSE: 251 MG/DL (ref 70–110)
POCT GLUCOSE: 262 MG/DL (ref 70–110)
POTASSIUM SERPL-SCNC: 4.3 MMOL/L (ref 3.5–5.1)
PROT SERPL-MCNC: 5.5 GM/DL (ref 5.8–7.6)
RBC # BLD AUTO: 4.54 X10(6)/MCL (ref 4.7–6.1)
SODIUM SERPL-SCNC: 138 MMOL/L (ref 136–145)
WBC # SPEC AUTO: 7.77 X10(3)/MCL (ref 4.5–11.5)

## 2024-02-27 PROCEDURE — 11000001 HC ACUTE MED/SURG PRIVATE ROOM

## 2024-02-27 PROCEDURE — 63600175 PHARM REV CODE 636 W HCPCS: Performed by: NURSE PRACTITIONER

## 2024-02-27 PROCEDURE — 25000003 PHARM REV CODE 250: Performed by: NURSE PRACTITIONER

## 2024-02-27 PROCEDURE — 82947 ASSAY GLUCOSE BLOOD QUANT: CPT | Performed by: NURSE PRACTITIONER

## 2024-02-27 PROCEDURE — 85025 COMPLETE CBC W/AUTO DIFF WBC: CPT | Performed by: NURSE PRACTITIONER

## 2024-02-27 PROCEDURE — 80053 COMPREHEN METABOLIC PANEL: CPT | Performed by: NURSE PRACTITIONER

## 2024-02-27 RX ORDER — METOPROLOL SUCCINATE 50 MG/1
100 TABLET, EXTENDED RELEASE ORAL DAILY
Status: DISCONTINUED | OUTPATIENT
Start: 2024-02-27 | End: 2024-02-28 | Stop reason: HOSPADM

## 2024-02-27 RX ORDER — AMIODARONE HYDROCHLORIDE 200 MG/1
200 TABLET ORAL 2 TIMES DAILY
Status: DISCONTINUED | OUTPATIENT
Start: 2024-02-27 | End: 2024-02-28 | Stop reason: HOSPADM

## 2024-02-27 RX ORDER — FAMOTIDINE 20 MG/1
20 TABLET, FILM COATED ORAL DAILY
Status: DISCONTINUED | OUTPATIENT
Start: 2024-02-27 | End: 2024-02-28 | Stop reason: HOSPADM

## 2024-02-27 RX ADMIN — TRAMADOL HYDROCHLORIDE 50 MG: 50 TABLET, COATED ORAL at 10:02

## 2024-02-27 RX ADMIN — AMIODARONE HYDROCHLORIDE 200 MG: 200 TABLET ORAL at 10:02

## 2024-02-27 RX ADMIN — ENOXAPARIN SODIUM 40 MG: 40 INJECTION SUBCUTANEOUS at 10:02

## 2024-02-27 RX ADMIN — FAMOTIDINE 20 MG: 20 TABLET, FILM COATED ORAL at 10:02

## 2024-02-27 RX ADMIN — ONDANSETRON 4 MG: 2 INJECTION INTRAMUSCULAR; INTRAVENOUS at 06:02

## 2024-02-27 RX ADMIN — METOPROLOL SUCCINATE 100 MG: 50 TABLET, EXTENDED RELEASE ORAL at 03:02

## 2024-02-27 RX ADMIN — SACUBITRIL AND VALSARTAN 1 TABLET: 24; 26 TABLET, FILM COATED ORAL at 10:02

## 2024-02-27 RX ADMIN — SPIRONOLACTONE 12.5 MG: 25 TABLET ORAL at 03:02

## 2024-02-27 RX ADMIN — SODIUM CHLORIDE, POTASSIUM CHLORIDE, SODIUM LACTATE AND CALCIUM CHLORIDE: 600; 310; 30; 20 INJECTION, SOLUTION INTRAVENOUS at 06:02

## 2024-02-27 RX ADMIN — ONDANSETRON 4 MG: 2 INJECTION INTRAMUSCULAR; INTRAVENOUS at 04:02

## 2024-02-27 NOTE — PT/OT/SLP PROGRESS
Physical Therapy Treatment    Patient Name:  Tashi Deluna   MRN:  [id number]    Attempted PT eval at 1438. Nurse reports recently transferring patient to chair. Patient currently c/o dizziness. Asked for PT to hold for the day. PT to f/u tomorrow.

## 2024-02-27 NOTE — PROGRESS NOTES
[facility] - 8th Floor Med Surg  General Surgery  Progress Note    POD # 1 Diagnostic laparoscopy, Laparoscopic reduction of right inguinal hernia (large, incarcerated), and Laparoscopic right inguinal hernia repair with mesh TEPP       Hospital Course:  Mr. Tashi Deluna is a 63 y.o. male who was admitted for an elective hernia repair procedure. Procedure performed as stated above. Due to complexity of right inguinal hernia repair, elected to abort incisional hernia repair and perform at a later time. Upon completion of procedure, pt was transferred from the recovery room to the post surgical floor for further care and treatment.   POD#1, afebrile, vital signs stable. The pt's diet was advanced to ADA diet.  Pt has been on bedrest since surgery.    Review of Systems: Mild incisional pain reported but tolerable. No nausea, vomiting, dysphagia, GERD. Pt denies any dizziness, palpitations, SOB, or CP. All other review of systems are negative.     Physical Examination:   Vital signs: stable, noted in chart  General: Awake and alert, able to answer all questions.   Respiratory:  Clear to auscultation bilaterally  Cardio: Regular rate and rhythm.  Abdomen: Soft, non distended. Bowel sounds present to all 4 quadrants. Lap sites clean, dry, and intact. Appropriate point tenderness to lap sites. Abdomen benign. Scrotal support intact.  Neuro: Alert and oriented x's 4.    Labs: none drawn this AM, will order    Medications:  Continuous Infusions:   lactated ringers      lactated ringers 125 mL/hr at 02/27/24 0643     Scheduled Meds:   enoxparin  40 mg Subcutaneous Daily     PRN Meds:dextrose 10%, dextrose 10%, glucagon (human recombinant), glucose, glucose, HYDROcodone-acetaminophen, insulin aspart U-100, levoFLOXacin, meperidine, midazolam, morphine, ondansetron, prochlorperazine, promethazine, traMADoL     Objective:     Vital Signs (Most Recent):  Temp: 98.7 °F (37.1 °C) (02/27/24 0638)  Pulse: 84 (02/27/24  0638)  Resp: 16 (02/27/24 0638)  BP: 121/80 (02/27/24 0638)  SpO2: 97 % (02/27/24 0638) Vital Signs (24h Range):  Temp:  [98.7 °F (37.1 °C)] 98.7 °F (37.1 °C)  Pulse:  [82-87] 84  Resp:  [8-25] 16  SpO2:  [94 %-99 %] 97 %  BP: (103-145)/() 121/80       Significant Labs:  None    Significant Diagnostics:  None    Assessment/Plan:     Active Diagnoses:    Diagnosis Date Noted POA    PRINCIPAL PROBLEM:  Recurrent right inguinal hernia [K40.91] 12/05/2023 Yes    Calculus of gallbladder without cholecystitis without obstruction [K80.20] 01/04/2024 Yes    Left inguinal hernia [K40.90] 01/04/2024 Yes    Incisional hernia of anterior abdominal wall without obstruction or gangrene [K43.2] 12/05/2023 Yes      Problems Resolved During this Admission:   Impression and Plan:     Pt POD#1 status post Lap RIH repair for large recurrent RIH and he is progressing well per post op protocol. Due to complexity of his procedure and medical conditions, will keep additional night for observation.     - Order CBC and CMP today  - Encourage OOB to chair and ambulation in alvarado today. Patient has not gotten  out of bed yet since surgery. Will consult PT to encourage post op mobilization.   - Consult case mgmt to evaluate discharge needs. Home PT, home health, etc.   - Continue IS  - Continue IV fluids  - Resume home meds as appropriate  - Dr. Tierney made rounds this AM and examined patient.       Ana María Rogel, ANP  General Surgery  [facility] - 8th Floor Med Surg

## 2024-02-27 NOTE — PLAN OF CARE
02/27/24 1104   Discharge Assessment   Assessment Type Discharge Planning Assessment   Confirmed/corrected address, phone number and insurance Yes   Confirmed Demographics Correct on Facesheet   Source of Information patient   When was your last doctors appointment?   (Patient reports last year.)   Communicated JAYDE with patient/caregiver Yes   Reason For Admission Recurrent Right Inguinal Hernia   People in Home alone   Do you expect to return to your current living situation? Yes   Do you have help at home or someone to help you manage your care at home? Yes   Who are your caregiver(s) and their phone number(s)? Brother: Juan Ramon Deluna: [phone]   Prior to hospitilization cognitive status: Unable to Assess   Current cognitive status: Alert/Oriented   Walking or Climbing Stairs Difficulty yes   Walking or Climbing Stairs ambulation difficulty, requires equipment   Dressing/Bathing Difficulty no   Home Accessibility stairs to enter home   Number of Stairs, Main Entrance three   Home Layout Able to live on 1st floor   Equipment Currently Used at Home cane, straight;walker, rolling   Readmission within 30 days? No   Patient currently being followed by outpatient case management? No   Do you currently have service(s) that help you manage your care at home? No   Do you take prescription medications? Yes   Do you have prescription coverage? Yes   Coverage Medicare Part A & B along with [facility]   Do you have any problems affording any of your prescribed medications? No   Is the patient taking medications as prescribed? yes   Who is going to help you get home at discharge? Jean reports his brother, Juan Ramon.   How do you get to doctors appointments? car, drives self   Are you on dialysis? No   Do you take coumadin? No  (Eliquis)   Discharge Plan A Home   Discharge Plan B Home with family   DME Needed Upon Discharge  none   Discharge Plan discussed with: Patient   Transition of Care Barriers None       CM  completed Discharge Assessment with patient at bedside.   Patient resides at home alone but noted his middle brother, Juan Ramon lives next door.  Patient's PCP is Dr. Guevara Torres.  Patient reports he has Type I diabetes.  Patient reports his brother, Juan Ramon will transport him at discharge.  No barriers to discharge at this time.

## 2024-02-27 NOTE — NURSING
Nurses Note -- 4 Eyes      2/26/2024  22:00        Skin assessed during: Admit      [] No Altered Skin Integrity Present    []Prevention Measures Documented      [x] Yes- Altered Skin Integrity Present or Discovered   [] LDA Added if Not in Epic (Describe Wound)   [] New Altered Skin Integrity was Present on Admit and Documented in LDA   [] Wound Image Taken    Wound Care Consulted? No    Attending Nurse:  Lb Edgar RN/Staff Member:   Rose

## 2024-02-27 NOTE — PLAN OF CARE
SSC reiterated the importance of the MOON form. Patient was able to sign. He was given a copy and the signed original was placed in the chart.

## 2024-02-27 NOTE — ANESTHESIA POSTPROCEDURE EVALUATION
Anesthesia Post Evaluation    Patient: Tashi Deluna    Procedure(s) Performed: Procedure(s) (LRB):  REPAIR, HERNIA, INCISIONAL  REPAIR, HERNIA, INGUINAL (Right)    Final Anesthesia Type: general      Patient location during evaluation: PACU  Patient participation: Yes- Able to Participate  Level of consciousness: awake and alert  Post-procedure vital signs: reviewed and stable  Pain management: adequate  Airway patency: patent    PONV status at discharge: No PONV  Anesthetic complications: no      Respiratory status: unassisted  Hydration status: euvolemic  Follow-up not needed.              Vitals Value Taken Time   /87 02/26/24 2041   Temp nl 02/26/24 2046   Pulse 75 02/26/24 2044   Resp 12 02/26/24 2044   SpO2 98 % 02/26/24 2043   Vitals shown include unvalidated device data.      No case tracking events are documented in the log.      Pain/Davis Score: Pain Rating Prior to Med Admin: 0 (2/26/2024  8:28 AM)

## 2024-02-28 VITALS
HEIGHT: 68 IN | OXYGEN SATURATION: 96 % | WEIGHT: 146.38 LBS | SYSTOLIC BLOOD PRESSURE: 123 MMHG | RESPIRATION RATE: 18 BRPM | BODY MASS INDEX: 22.19 KG/M2 | HEART RATE: 84 BPM | DIASTOLIC BLOOD PRESSURE: 74 MMHG | TEMPERATURE: 98 F

## 2024-02-28 PROBLEM — K40.90 LEFT INGUINAL HERNIA: Status: RESOLVED | Noted: 2024-01-04 | Resolved: 2024-02-28

## 2024-02-28 PROBLEM — K40.91 RECURRENT RIGHT INGUINAL HERNIA: Status: RESOLVED | Noted: 2023-12-05 | Resolved: 2024-02-28

## 2024-02-28 LAB
POCT GLUCOSE: 234 MG/DL (ref 70–110)
POCT GLUCOSE: 254 MG/DL (ref 70–110)
POCT GLUCOSE: 322 MG/DL (ref 70–110)

## 2024-02-28 PROCEDURE — 25000003 PHARM REV CODE 250: Performed by: NURSE PRACTITIONER

## 2024-02-28 PROCEDURE — 97162 PT EVAL MOD COMPLEX 30 MIN: CPT

## 2024-02-28 PROCEDURE — 63600175 PHARM REV CODE 636 W HCPCS: Performed by: NURSE PRACTITIONER

## 2024-02-28 RX ORDER — AMIODARONE HYDROCHLORIDE 400 MG/1
200 TABLET ORAL 2 TIMES DAILY
Start: 2024-02-28

## 2024-02-28 RX ORDER — TRAMADOL HYDROCHLORIDE 50 MG/1
50 TABLET ORAL EVERY 6 HOURS PRN
Qty: 12 TABLET | Refills: 0 | Status: SHIPPED | OUTPATIENT
Start: 2024-02-28

## 2024-02-28 RX ADMIN — AMIODARONE HYDROCHLORIDE 200 MG: 200 TABLET ORAL at 09:02

## 2024-02-28 RX ADMIN — ENOXAPARIN SODIUM 40 MG: 40 INJECTION SUBCUTANEOUS at 09:02

## 2024-02-28 RX ADMIN — FAMOTIDINE 20 MG: 20 TABLET, FILM COATED ORAL at 09:02

## 2024-02-28 RX ADMIN — METOPROLOL SUCCINATE 100 MG: 50 TABLET, EXTENDED RELEASE ORAL at 09:02

## 2024-02-28 RX ADMIN — SACUBITRIL AND VALSARTAN 1 TABLET: 24; 26 TABLET, FILM COATED ORAL at 09:02

## 2024-02-28 RX ADMIN — SPIRONOLACTONE 12.5 MG: 25 TABLET ORAL at 09:02

## 2024-02-28 NOTE — PROGRESS NOTES
Pt was given discharge instructions, home medications, and follow up appointments. He was discharged to home in stable condition with all questions answered.

## 2024-02-28 NOTE — DISCHARGE SUMMARY
[facility] - 8th Floor Med Surg  General Surgery  Discharge Summary      Patient Name: Tashi Deluna  MRN: [id number]  Admission Date: 2/26/2024  Hospital Length of Stay: 1 days  Discharge Date and Time:  02/28/2024 10:33 AM  Attending Physician: Jean Tierney MD   Discharging Provider: MARSHA Ramos  Primary Care Provider: Guevara Torres MD       Pre-op Diagnosis:  Recurrent right inguinal hernia [K40.91]  Incisional hernia of anterior abdominal wall without obstruction or gangrene [K43.2]     Post-op Diagnosis:  Post-Op Diagnosis Codes:     * Recurrent right inguinal hernia [K40.91]     * Incisional hernia of anterior abdominal wall without obstruction or gangrene [K43.2]     Procedure: Diagnostic laparoscopy, Laparoscopic reduction of right inguinal hernia (large, incarcerated), and Laparoscopic right inguinal hernia repair with mesh TEPP 2/26/24 per Dr. Tierney    [facility] Course:  Mr. Tashi Deluna is a 63 y.o. male who was admitted for an elective hernia repair.  Procedure performed as stated above.   Upon completion of procedure, patient was transferred from the recovery room, then to the post surgical floor for further care and treatment.   POD#1, afebrile, vital signs stable. The patient's diet was advanced to ADA diet.    POD# 2 patient ambulating with walker assist in hallway. + flatus. + voiding. Patient currently tolerating PO, is hemodynamically stable, and is ready for discharge.     Review of Systems: Mild incisional pain reported but tolerable. Well controlled overnight with Tramadol. No nausea, vomiting, dysphagia, GERD. Patient ambulating in the room/hallway and voiding without difficulty. Patient denies any dizziness, palpitations, SOB, or CP. All other review of systems are negative.     Physical Examination:   Vital signs: stable, noted in chart  General: Awake and alert, able to answer all questions. Resting in bed with family member at bedside.  Respiratory:   Clear to auscultation bilaterally  Cardio: Regular rate and rhythm.  Abdomen: Soft, non distended. Bowel sounds present to all 4 quadrants. Lap sites clean, dry, and intact. Abdomen benign. Lap sites C/D/I. Scrotal support intact. No swelling to groin area.   Neuro: Alert and oriented x's 4.    Labs:  none today    Consults:   Consults (From admission, onward)          Status Ordering Provider     Inpatient consult to Social Work/Case Management  Once        Provider:  (Not yet assigned)    SIDDHARTH Carlton            Significant Diagnostic Studies: N/A    Pending Diagnostic Studies:       None          Final Active Diagnoses:    Diagnosis Date Noted POA    Calculus of gallbladder without cholecystitis without obstruction [K80.20] 01/04/2024 Yes    Incisional hernia of anterior abdominal wall without obstruction or gangrene [K43.2] 12/05/2023 Yes      Problems Resolved During this Admission:    Diagnosis Date Noted Date Resolved POA    PRINCIPAL PROBLEM:  Recurrent right inguinal hernia [K40.91] 12/05/2023 02/28/2024 Yes    Left inguinal hernia [K40.90] 01/04/2024 02/28/2024 Yes      Discharged Condition: good    Disposition: Home or Self Care    Follow Up:   Follow-up Information       Jean Tierney MD Follow up in 2 week(s).    Specialty: General Surgery  Contact information:  [address] [address] [phone]                           Patient Instructions:   No discharge procedures on file.  Medications:  Reconciled Home Medications:      Medication List        START taking these medications      traMADoL 50 mg tablet  Commonly known as: ULTRAM  Take 1 tablet (50 mg total) by mouth every 6 (six) hours as needed for Pain.            CHANGE how you take these medications      amiodarone 400 MG tablet  Commonly known as: PACERONE  Take 0.5 tablets (200 mg total) by mouth 2 (two) times daily. Amiodarone 200mg tablets 400mg twice daily x 3 days then 200mg twice daily x 3 days  then 200mg daily  What changed:   how much to take  how to take this  when to take this            CONTINUE taking these medications      BENADRYL ALLERGY 25 mg tablet  Generic drug: diphenhydrAMINE  Take 25 mg by mouth daily as needed for Allergies.     ELIQUIS 5 mg Tab  Generic drug: apixaban  Take 5 mg by mouth 2 (two) times daily.     ENTRESTO 24-26 mg per tablet  Generic drug: sacubitriL-valsartan  Take 1 tablet by mouth 2 (two) times daily.     famotidine 10 MG tablet  Commonly known as: PEPCID  Take 10 mg by mouth 2 (two) times daily.     insulin regular 100 unit/mL injection  Inject into the skin 3 (three) times daily before meals. Per sliding scale     LANTUS U-100 INSULIN 100 unit/mL injection  Generic drug: insulin glargine  Inject 10 Units into the skin Daily. Per sliding scale     magnesium oxide 500 mg Tab  Take 1 tablet by mouth once daily.     metoprolol succinate 100 MG 24 hr tablet  Commonly known as: TOPROL-XL  Take 100 mg by mouth once daily.     omeprazole 20 MG capsule  Commonly known as: PRILOSEC  Take 20 mg by mouth daily as needed (acid reflux).     pantoprazole 40 MG tablet  Commonly known as: PROTONIX  Take 40 mg by mouth once daily.     rosuvastatin 10 MG tablet  Commonly known as: CRESTOR  Take 10 mg by mouth once daily.     spironolactone 25 MG tablet  Commonly known as: ALDACTONE  Take 12.5 mg by mouth once daily.     VITAMIN B-12 ORAL  Take by mouth.     vitamin D 1000 units Tab  Commonly known as: VITAMIN D3  Take 1,000 Units by mouth once daily.     VITAMIN D ORAL  Take by mouth.            Discharge Medications:   Tramdol 50 mg tab 1 po q 6 hr prn pain, disp# 12, NR    Condition: Satisfactory    Disposition:  Discharge to home with family.     - Follow up in two weeks with Dr. Jean Tierney in office.  - Continue IS at home  - Walk 20min daily   - Monitor CBGs and BP while at home and contact surgeons office or PCP if any issues  - Discharge instructions reviewed with patient   prior to discharge. Printed handouts provided regarding post op care/diet/medications.   - Discharge medication reconciliation completed. Ok to resume Eliquis 48 hours after hospital DC.     - Discussed plan of care with patient, Explained high likelihood of post op seroma development in groin area. Will take several weeks to resolve completely due to large size of RIH. Encouraged scrotal support to limit swelling. Offer abdominal binder per pt request for comfort related to incisional hernia. Will plan to repair large incisional hernia after fully recovered from inguinal hernia repair.     Ana María Rogel, MARSHA  General Surgery  [facility] - 8th Floor Med Surg

## 2024-02-28 NOTE — PLAN OF CARE
Problem: Physical Therapy  Goal: Physical Therapy Goal  Description: Goals to be met by: 3/28/24     Patient will increase functional independence with mobility by performin. Supine to sit with [place]  2. Sit to supine with [place]  3. Sit to stand transfer with Modified [place]  4. Gait  x 600 feet with Modified [place] using LRAD  5. Ascend/descend 3 stairs with bilateral Handrails & Modified [place]     Outcome: Ongoing, Progressing

## 2024-02-28 NOTE — DISCHARGE INSTRUCTIONS
Dr. Tierney's Post Operative Inguinal Hernia Discharge Instructions    Laparoscopic Inguinal Hernia Repair, Adult    Laparoscopic inguinal hernia repair is a surgical procedure to repair a small weak spot in the groin muscles that allows fat or intestine from inside the abdomen to bulge out (inguinal hernia). This procedure may be planned, or it may be an emergency procedure.  During the procedure, tissue that has bulged out is moved back into place, and the opening in the groin muscles is repaired. This is done through three small incisions in the abdomen. A thin tube with a light and camera on the end (laparoscope) will be used to help perform the procedure.      Laparoscopic Inguinal Hernia Repair, Adult, Care After  This sheet gives you information about how to care for yourself after your procedure. Your health care provider may also give you more specific instructions. If you have problems or questions, contact your health care provider.  What can I expect after the procedure?  After the procedure, it is common to have:   Pain.   Swelling and bruising around the incision area.   Scrotal swelling, in men.   Some fluid or blood draining from your incisions.  Follow these instructions at home:  Incision care   Follow instructions from your health care provider about how to take care of your incisions. Make sure you:  ? Wash your hands with soap and water before you change your bandage (dressing). If soap and water are not available, use hand .  ? Keep surgical gauze dressing on for 48 hours, then ok to remove gauze dressings and shower.   Ok to shower. Wash incisions with antibacterial soap and water for 2 weeks post op. Pat dry. Avoid tub baths/swimming for 2 weeks post op to ensure incisions have completely closed.   ? Leave stitches (sutures), skin glue, or adhesive strips in place. If adhesive strip edges start to loosen and curl up, you may trim the loose edges. Do not remove  adhesive strips for 7 days post op. If strips still present 1 week after surgery, ok to remove strips in the shower.    Check your incision area every day for signs of infection. Check for:  ? More redness, swelling, or pain.  ? More fluid or blood.  ? Warmth.  ? Pus or a bad smell.   Wear loose, soft clothing while your incisions heal.   Wear scrotal support (jock strap) provided to you by the hospital staff to prevent post op swelling. If scrotal support not available, please wear tight fitted briefs to limit scrotal swelling.   Scrotal support helps to limit swelling in the groin and scrotum in the initial post op period. Wear scrotal support for first 2 weeks post op if possible.   Driving   Do not drive or use heavy machinery while taking prescription pain medicine.     Do not drive for 3 days after surgery or as long as you are taking prescription pain medicine.    Activity   Do not lift anything that is heavier than 10 lb (4.5 kg) for 4 weeks post op.    Ask your health care provider what activities are safe for you. A lot of activity during the first week after surgery can increase pain and swelling. For 1 week after your procedure:  ? Avoid activities that take a lot of effort, such as exercise or sports.  ? You may walk and climb stairs as needed for daily activity, but avoid long walks or climbing stairs for exercise.  Managing pain and swelling     Put ice on painful or swollen areas:  ? Put ice in a plastic bag.  ? Place a towel between your skin and the bag.  ? Leave the ice on for 20 minutes, 2-3 times a day.  General instructions   Take over-the-counter and prescription medicines only as told by your health care provider.   To prevent or treat constipation while you are taking prescription pain medicine, your health care provider may recommend that you:   ? Drink enough fluid to keep your urine pale yellow.  ? Take over-the-counter or prescription medicines.   ? Eat foods that are high in fiber, such  as fresh fruits and vegetables, whole grains, and beans.   ? Limit foods that are high in fat and processed sugars, such as fried and sweet foods.   Do not use any products that contain nicotine or tobacco, such as cigarettes and e-cigarettes. If you need help quitting, ask your health care provider.   Drink enough fluid to keep your urine pale yellow.   Keep all follow-up visits as told by your health care provider. This is important.  Contact a health care provider if:   You have more redness, swelling, or pain around your incisions or your groin area.   You have more swelling in your scrotum.   You have more fluid or blood coming from your incisions.   Your incisions feel warm to the touch.   You have severe pain and medicines do not help.   You have abdominal pain or swelling.   You cannot eat or drink without vomiting.   You cannot urinate or pass a bowel movement.   You faint.   You feel dizzy.   You have nausea and vomiting.   You have a fever.  Get help right away if:   You have pus or a bad smell coming from your incisions.   You have redness, warmth, or pain in your leg.   You have chest pain.   You have problems breathing.  Summary   Pain, swelling, and bruising are common after the procedure.   Check your incision area every day for signs of infection, such as more redness, swelling, or pain.   Put ice on painful or swollen areas for 20 minutes, 2-3 times a day.  This information is not intended to replace advice given to you by your health care provider. Make sure you discuss any questions you have with your health care provider.    How to Prevent Constipation After Surgery  Constipation is a common problem after surgery. Many things can make constipation more likely after a surgery, including:   Certain medicines, especially numbing medicines (anesthetics) and very strong pain medicines called opioids.   Feeling stressed because of the surgery.   Eating different foods than normal.   Being less  active.  Symptoms of constipation include:   Having fewer than three bowel movements a week.   Straining to have a bowel movement.   Having hard, dry, or larger-than-normal stools (feces).   Discomfort in the lower abdomen, such as cramps or bloating.   Not feeling relief after having a bowel movement.   Nausea and vomiting.  You can take steps to help prevent constipation after surgery.  Follow these instructions at home:  Eating and drinking     Eat foods that have a lot of fiber in them, such as beans, bran, whole grains, and fresh fruits and vegetables.   Limit foods that are high in fat and processed sugars, such as fried or sweet foods. These include french fries, hamburgers, cookies, and candy.   Take a fiber supplement as told by your health care provider. If you are not taking a fiber supplement and you think you are not getting enough fiber from foods, talk to your health care provider about adding a fiber supplement to your diet.   Drink enough fluid to keep your urine pale yellow.   Drink clear fluids, especially water. Avoid drinking alcohol, caffeine, and soda. These can make constipation worse.  Activity     After surgery, return to your normal activities slowly, or when your health care provider says it is okay.   Start walking as soon as you can. Try to go a little farther each day.   Once your health care provider approves, do some sort of regular exercise. This helps prevent constipation.  Bowel movements   Go to the restroom when you have the urge to go. Do not hold it in.   Try drinking something hot to get a bowel movement started.   Keep track of how often you use the restroom.  Medicines   Take over-the-counter and prescription medicines only as told by your health care provider.   Talk to your health care provider about medicines that may help prevent constipation, particularly if you have a history of constipation. Your health care provider may suggest a stool softener, laxative, or fiber  supplement.   Do not take any medicines without talking to your health care provider first.  Contact a health care provider if:   You used stool softeners or laxatives and still have not had a bowel movement within 24-48 hours after using them.   You have not had a bowel movement in 3 days.   You have a fever.  Get help right away if you have:   Constipation that lasts for more than 4 days or if your symptoms get worse.   Bright red blood in your stool.   Pain in the abdomen or rectum.   Very bad cramping.   Thin, pencil-like stools.   Unexplained weight loss.  Summary   Constipation is a common problem after surgery. Many things can make constipation more likely after a surgery, including certain medicines, eating different foods than normal, and being less active.   Symptoms of constipation include having fewer than three bowel movements a week, straining to have a bowel movement, and cramps or bloating in the lower abdomen.   To help prevent constipation, you should eat foods that are high in fiber, drink plenty of fluids, and get regular physical activity.   Your health care provider may suggest medicines, such as stool softeners or laxatives, to help prevent constipation.  This information is not intended to replace advice given to you by your health care provider. Make sure you discuss any questions you have with your health care provider.  Document Revised: 11/04/2020 Document Reviewed: 11/04/2020  [employer] Patient Education © 2021 [employer]

## 2024-02-28 NOTE — PT/OT/SLP EVAL
Physical Therapy Evaluation    Patient Name:  Tashi Deluna   MRN:  [id number]    Recommendations:     Discharge therapy intensity: Low Intensity Therapy   Discharge Equipment Recommendations: none   Barriers to discharge: None    Assessment:     Tashi Deluna is a 63 y.o. male admitted with a medical diagnosis of recurrent right inguinal hernia.  He presents with the following impairments/functional limitations: gait instability, impaired balance, decreased safety awareness. Patient mobilized OOB and ambulated 185 ft w/o RW & 185 ft w/ RW. Much more steady with RW as expected. Recommend low intensity therapy and use of rollator at discharge. Progress as tolerated.     Rehab Prognosis: Good; patient would benefit from acute skilled PT services to address these deficits and reach maximum level of function.    Recent Surgery: Procedure(s) (LRB):  REPAIR, HERNIA, INCISIONAL  REPAIR, HERNIA, INGUINAL (Right) 2 Days Post-Op    Plan:     During this hospitalization, patient to be seen 5 x/week to address the identified rehab impairments via gait training, therapeutic activities and progress toward the following goals:    Plan of Care Expires:  03/28/24    Subjective     Chief Complaint: none  Patient/Family Comments/goals: none  Pain/Comfort:  Pain Rating 1: 3/10  Location 1: abdomen  Pain Addressed 1: Reposition, Distraction  Pain Rating Post-Intervention 1: 3/10    Patients cultural, spiritual, Hindu conflicts given the current situation: no    Living Environment:  Lives alone in [facility] with 3 steps to enter  Prior to admission, patients level of function was independent.  Equipment used at home: cane, straight, rollator.  DME owned (not currently used): none.  Upon discharge, patient will have assistance from family    Objective:     Communicated with nurse prior to session.  Patient found supine with telemetry  upon PT entry to room.    General Precautions: Standard, fall  Orthopedic Precautions:N/A   Braces:  N/A  Respiratory Status: Room air    Exams:  Cognitive Exam:  Patient is oriented to Person, Place, Time, and Situation  Sensation: -       Intact  RLE ROM: WFL  RLE Strength: WFL  LLE ROM: WFL  LLE Strength: WFL  Skin integrity: Visible skin intact      Functional Mobility:  Bed Mobility:  Supine to Sit: stand by assistance  Transfers:  Sit to Stand:  contact guard assistance with rolling walker  Gait: Ambulated 185 ft w/o RW & 185 ft w/ RW. Much more steady with RW as expected.   Balance: fair with RW      AM-PAC 6 CLICK MOBILITY  Total Score:20     Education Provided:  Role and goals of PT, transfer training, bed mobility, gait training, balance training, safety awareness, assistive device, strengthening exercises, and importance of participating in PT to return to PLOF.    Patient left  on couch  with all lines intact and call button in reach.    GOALS:   Multidisciplinary Problems       Physical Therapy Goals          Problem: Physical Therapy    Goal Priority Disciplines Outcome Goal Variances Interventions   Physical Therapy Goal     PT, PT/OT Ongoing, Progressing     Description: Goals to be met by: 3/28/24     Patient will increase functional independence with mobility by performin. Supine to sit with [place]  2. Sit to supine with [place]  3. Sit to stand transfer with Modified [place]  4. Gait  x 600 feet with Modified [place] using LRAD  5. Ascend/descend 3 stairs with bilateral Handrails & Modified [place]                          History:     Past Medical History:   Diagnosis Date    Atrial fibrillation     CHF (congestive heart failure)     Dysphagia     Fatigue     GERD (gastroesophageal reflux disease)     History of acute pancreatitis     History of anxiety     Incisional hernia of anterior abdominal wall without obstruction or gangrene     Inguinal hernia, bilateral     Neuropathy, diabetic     SOB (shortness of breath)     Type 1 diabetes mellitus     Weakness         Past Surgical History:   Procedure Laterality Date    ABLATION N/A 07/05/2022    Procedure: ABLATION;  Surgeon: Kin Hubbard MD;  Location: [facility] CATH LAB;  Service: Cardiology;  Laterality: N/A;  AV NODE ABLATION W/ ANEST.    CARDIAC ELECTROPHYSIOLOGY STUDY AND ABLATION      CARDIOVERSION      COLONOSCOPY  2021    Gastric Ulcer Sx  2009    Dr. aguiar    HERNIA REPAIR Right     Open (6 years old)    INSERTION OF PACEMAKER  04/2022    REPAIR, HERNIA, INCISIONAL  2/26/2024    Procedure: REPAIR, HERNIA, INCISIONAL;  Surgeon: Jean Aguiar MD;  Location: [facility] OR;  Service: General;;  Open abdominal incisional hernia repair with mesh.    REPAIR, HERNIA, INGUINAL Right 2/26/2024    Procedure: REPAIR, HERNIA, INGUINAL;  Surgeon: Jean Aguiar MD;  Location: [facility] OR;  Service: General;  Laterality: Right;  Open right inguinal hernia repair with mesh.       Time Tracking:     PT Received On: 02/28/24  PT Start Time: 0857     PT Stop Time: 0920  PT Total Time (min): 23 min     Billable Minutes: Evaluation 23 minutes      02/28/2024

## 2024-03-07 NOTE — PROGRESS NOTES
Patient ID: [id number]   Chief Complaint: Post-op Evaluation (2/26/24 open incisional hernia repair and open right inguinal hernia repair )    HPI:     Tashi Deluna is a 63 y.o. male here today for postoperative visit of diagnostic lap with repair of right inguinal hernia with mesh on 2/26/2024. Pt's incisions are healing well, denies any abd pain. having normal bowel movements, passing flatus, no NVD. no constipation. +R groin and R scrotal swelling. No issues urinating. Asking about his umbilical hernia and when they will be able to fix it. Asking about right groin and scrotal swelling.     Patient Care Team:  Guevara Torres MD as PCP - General (Family Medicine)  Jean Tierney MD as Surgeon (General Surgery)  Kin Hubbard MD as Consulting Physician (Cardiology)  Jus Douglass MD (Endocrinology)   Past Medical History:   Diagnosis Date    Atrial fibrillation     CHF (congestive heart failure)     Dysphagia     Fatigue     GERD (gastroesophageal reflux disease)     History of acute pancreatitis     History of anxiety     Incisional hernia of anterior abdominal wall without obstruction or gangrene     Inguinal hernia, bilateral     Neuropathy, diabetic     SOB (shortness of breath)     Type 1 diabetes mellitus     Weakness       Past Surgical History:   Procedure Laterality Date    ABLATION N/A 07/05/2022    Procedure: ABLATION;  Surgeon: Kin Hubbard MD;  Location: [facility] CATH LAB;  Service: Cardiology;  Laterality: N/A;  AV NODE ABLATION W/ ANEST.    CARDIAC ELECTROPHYSIOLOGY STUDY AND ABLATION      CARDIOVERSION      COLONOSCOPY  2021    Gastric Ulcer Sx  2009    Dr. tierney    HERNIA REPAIR Right     Open (6 years old)    INSERTION OF PACEMAKER  04/2022    REPAIR, HERNIA, INCISIONAL  2/26/2024    Procedure: REPAIR, HERNIA, INCISIONAL;  Surgeon: Jean Tierney MD;  Location: [facility] OR;  Service: General;;  Open abdominal incisional hernia repair with mesh.    REPAIR, HERNIA, INGUINAL Right  "2024    Procedure: REPAIR, HERNIA, INGUINAL;  Surgeon: Jean Tierney MD;  Location: [facility];  Service: General;  Laterality: Right;  Open right inguinal hernia repair with mesh.      Social History     Tobacco Use    Smoking status: Former     Current packs/day: 0.00     Types: Cigarettes     Quit date:      Years since quittin.2    Smokeless tobacco: Never   Substance and Sexual Activity    Alcohol use: Never    Drug use: Never    Sexual activity: Not on file      Current Outpatient Medications   Medication Instructions    amiodarone (PACERONE) 200 mg, Oral, 2 times daily, Amiodarone 200mg tablets 400mg twice daily x 3 days then 200mg twice daily x 3 days then 200mg daily    cyanocobalamin, vitamin B-12, (VITAMIN B-12 ORAL) Oral    diphenhydrAMINE (BENADRYL ALLERGY) 25 mg, Oral, Daily PRN    ELIQUIS 5 mg, Oral, 2 times daily    ENTRESTO 24-26 mg per tablet 1 tablet, Oral, 2 times daily    ergocalciferol, vitamin D2, (VITAMIN D ORAL) Oral    famotidine (PEPCID) 10 mg, Oral, 2 times daily    insulin regular 100 unit/mL Inj injection Subcutaneous, 3 times daily before meals, Per sliding scale     LANTUS U-100 INSULIN 10 Units, Subcutaneous, Daily, Per sliding scale    magnesium oxide 500 mg Tab 1 tablet, Oral, Daily    metoprolol succinate (TOPROL-XL) 100 mg, Oral, Daily    omeprazole (PRILOSEC) 20 mg, Oral, Daily PRN    pantoprazole (PROTONIX) 40 mg, Oral, Daily    rosuvastatin (CRESTOR) 10 mg, Oral, Daily    spironolactone (ALDACTONE) 12.5 mg, Oral, Daily    traMADoL (ULTRAM) 50 mg, Oral, Every 6 hours PRN    vitamin D (VITAMIN D3) 1,000 Units, Oral, Daily     Review of patient's allergies indicates:   Allergen Reactions    Amoxicillin Rash        Subjective:     Review of Systems    12 point review of systems conducted, negative except as stated in the history of present illness. See HPI for details.    Objective:     Visit Vitals  /83   Pulse 85   Ht 5' 8" (1.727 m)   Wt 66.2 kg (146 lb) "   BMI 22.20 kg/m²       Physical Exam:  General:  Alert and oriented.    Gastrointestinal:  Soft, Non-tender, Non-distended, Normal bowel sounds. Incision sites are healing well, no redness, drainage, or foul odor. No right inguinal hernia noted but there is a moderate amount of R groin swelling along with R sided scrotal swelling. Pt has on scrotal support at this time.  + large umbilical hernia, reducible, no tenderness.    Musculoskeletal:  Normal range of motion, Normal strength.    Integumentary:  Warm, Dry, Pink.    Neurologic:  Alert, Oriented.    Psychiatric:  Cooperative.      Assessment:       ICD-10-CM ICD-9-CM   1. Postoperative visit  Z48.89 V58.49   2. S/P right inguinal hernia repair  Z98.890 V45.89    Z87.19    3. Umbilical hernia without obstruction and without gangrene  K42.9 553.1        Plan:     1. Postoperative visit    2. S/P right inguinal hernia repair    3. Umbilical hernia without obstruction and without gangrene       - Incisions healing well   - ok to take adhesive off with adhesive remover   - ok to get in tub, swim, wash with any soap, and still no lifting >15# x's 2 weeks.  - ED precautions given to patient and is to call the office immediately or go to the emergency room if he notices any redness, swelling, severe pain, increase nausea/vomiting, fever, irregular bowel movements or severe diarrhea  - f/u at the beginning of May with Dr. Tierney to evaluate R groin swelling as well as discuss fixing his umbilical hernia    No future appointments.     MIO Tinoco

## 2024-03-11 ENCOUNTER — TELEPHONE (OUTPATIENT)
Dept: SURGERY | Facility: CLINIC | Age: 64
End: 2024-03-11

## 2024-03-11 ENCOUNTER — OFFICE VISIT (OUTPATIENT)
Dept: SURGERY | Facility: CLINIC | Age: 64
End: 2024-03-11
Payer: MEDICARE

## 2024-03-11 VITALS
DIASTOLIC BLOOD PRESSURE: 83 MMHG | WEIGHT: 146 LBS | HEART RATE: 85 BPM | BODY MASS INDEX: 22.13 KG/M2 | SYSTOLIC BLOOD PRESSURE: 124 MMHG | HEIGHT: 68 IN

## 2024-03-11 DIAGNOSIS — Z48.89 POSTOPERATIVE VISIT: Primary | ICD-10-CM

## 2024-03-11 DIAGNOSIS — Z98.890 S/P RIGHT INGUINAL HERNIA REPAIR: ICD-10-CM

## 2024-03-11 DIAGNOSIS — K42.9 UMBILICAL HERNIA WITHOUT OBSTRUCTION AND WITHOUT GANGRENE: ICD-10-CM

## 2024-03-11 DIAGNOSIS — Z87.19 S/P RIGHT INGUINAL HERNIA REPAIR: ICD-10-CM

## 2024-03-11 PROCEDURE — 99024 POSTOP FOLLOW-UP VISIT: CPT | Mod: POP,,, | Performed by: NURSE PRACTITIONER

## 2024-03-11 NOTE — TELEPHONE ENCOUNTER
----- Message from MIO Tinoco sent at 3/11/2024 11:18 AM CDT -----  Regarding: inguinal swelling recheck  1. Plz make f/u appt for first week of May with G to recheck R inguinal hernia, swelling and reeval of umbilical hernia.

## 2024-03-15 NOTE — OP NOTE
[facility] - 8th Floor Med Surg  Surgery Department  Operative Note    SUMMARY     Date of Procedure: 2/26/2024     Procedure: Procedure(s) (LRB):  REPAIR, HERNIA, INCISIONAL  REPAIR, HERNIA, INGUINAL (Right)     Surgeon(s) and Role:  Panel 1:     * Jean Tierney MD - Primary  Panel 2:     * Jean Tierney MD - Primary    Assistant: BEATRICE Rogel NP    Pre-Operative Diagnosis: Recurrent right inguinal hernia [K40.91]  Incisional hernia of anterior abdominal wall without obstruction or gangrene [K43.2]    Post-Operative Diagnosis: Post-Op Diagnosis Codes:     * Recurrent right inguinal hernia [K40.91]     * Incisional hernia of anterior abdominal wall without obstruction or gangrene [K43.2]    Anesthesia: General    Operative Findings (including complications, if any): inguinal hernia    Description of Technical Procedures: After informed consent, the patient was brought to the OR.  He was placed on the table in the supine position, and given general anesthesia.    The abdomen was prepped and draped.  The bladder was emptied.  An infraumbilicial incision was made into the preperitoneal space.  A peritoneal dissection balloon was placed and insufflated under direct vision.  The SBT replaced this device and pneumopreperitoneum was established.  Additional working ports placed under direct vision.  He had a large indirect hernia.  The contents were reduced.  Appropriate dissection revealed the appropriate structures (Ileopubic tract, Iliac Vessels, Cord structures, Parish's ligament, Inferior Epigastric Vessels) There was no injury to any of these structures.  Large right 3D Max mesh placed up against the myopectineal orifice.  This completed the repair.     There was no left sided hernia.  Trocars removed.  Incisions closed with 0 and 4-0 Vicryl.  The patient was allowed to emerge from anesthetic and brought to recovery room.      Significant Surgical Tasks Conducted by the Assistant(s), if  Applicable:      Estimated Blood Loss (EBL): * No values recorded between 2/26/2024  1:19 PM and 2/26/2024  2:33 PM *           Implants:   Implant Name Type Inv. Item Serial No.  Lot No. LRB No. Used Action   MESH HERNIA 3DMAX XLG 5X7 RT - [id number]  MESH HERNIA 3DMAX XLG 5X7 RT  [employer] [id number] Right 1 Implanted       Specimens:   Specimen (24h ago, onward)      None                    Condition: Good    Disposition: PACU - hemodynamically stable.    Attestation: I was present and scrubbed for the entire procedure.    DISCHARGE NOTE    DISCHARGE DATE:   2/26/2024    PRINCIPAL DIAGNOSIS:  Recurrent right inguinal hernia    OUTCOME:  Satisfactory    DISPOSITION:  Home    FOLLOWUP:  In general surgery clinic    Jean Tierney MD  General Surgery  [facility] - 8th Floor Med Surg

## 2024-04-15 ENCOUNTER — LAB VISIT (OUTPATIENT)
Dept: LAB | Facility: HOSPITAL | Age: 64
End: 2024-04-15
Attending: FAMILY MEDICINE
Payer: MEDICARE

## 2024-04-15 DIAGNOSIS — E78.5 HYPERLIPIDEMIA, UNSPECIFIED HYPERLIPIDEMIA TYPE: ICD-10-CM

## 2024-04-15 DIAGNOSIS — Z12.5 SPECIAL SCREENING FOR MALIGNANT NEOPLASM OF PROSTATE: ICD-10-CM

## 2024-04-15 DIAGNOSIS — E11.65 INADEQUATELY CONTROLLED DIABETES MELLITUS: Primary | ICD-10-CM

## 2024-04-15 DIAGNOSIS — Z79.899 ENCOUNTER FOR LONG-TERM (CURRENT) USE OF OTHER MEDICATIONS: ICD-10-CM

## 2024-04-15 DIAGNOSIS — E05.90 PRETIBIAL MYXEDEMA: ICD-10-CM

## 2024-04-15 LAB
ALBUMIN SERPL-MCNC: 3 G/DL (ref 3.4–4.8)
ALP SERPL-CCNC: 85 UNIT/L (ref 40–150)
ALT SERPL-CCNC: 6 UNIT/L (ref 0–55)
ANION GAP SERPL CALC-SCNC: 8 MEQ/L
APPEARANCE UR: CLEAR
AST SERPL-CCNC: 10 UNIT/L (ref 5–34)
BACTERIA #/AREA URNS AUTO: NORMAL /HPF
BASOPHILS # BLD AUTO: 0.01 X10(3)/MCL
BASOPHILS NFR BLD AUTO: 0.3 %
BILIRUB SERPL-MCNC: 1.5 MG/DL
BILIRUB UR QL STRIP.AUTO: NEGATIVE
BILIRUBIN DIRECT+TOT PNL SERPL-MCNC: 0.6 MG/DL (ref 0–?)
BILIRUBIN DIRECT+TOT PNL SERPL-MCNC: 0.9 MG/DL (ref 0–0.8)
BUN SERPL-MCNC: 7.4 MG/DL (ref 8.4–25.7)
CALCIUM SERPL-MCNC: 8.9 MG/DL (ref 8.8–10)
CHLORIDE SERPL-SCNC: 105 MMOL/L (ref 98–107)
CHOLEST SERPL-MCNC: 168 MG/DL
CHOLEST/HDLC SERPL: 3 {RATIO} (ref 0–5)
CO2 SERPL-SCNC: 30 MMOL/L (ref 23–31)
COLOR UR AUTO: YELLOW
CREAT SERPL-MCNC: 0.72 MG/DL (ref 0.73–1.18)
CREAT/UREA NIT SERPL: 10
EOSINOPHIL # BLD AUTO: 0.06 X10(3)/MCL (ref 0–0.9)
EOSINOPHIL NFR BLD AUTO: 1.9 %
ERYTHROCYTE [DISTWIDTH] IN BLOOD BY AUTOMATED COUNT: 12.8 % (ref 11.5–17)
EST. AVERAGE GLUCOSE BLD GHB EST-MCNC: 182.9 MG/DL
FT4I SERPL CALC-MCNC: 3.29 (ref 2.6–3.6)
GFR SERPLBLD CREATININE-BSD FMLA CKD-EPI: >60 MLS/MIN/1.73/M2
GLUCOSE SERPL-MCNC: 98 MG/DL (ref 82–115)
GLUCOSE UR QL STRIP.AUTO: 100
HBA1C MFR BLD: 8 %
HCT VFR BLD AUTO: 38.3 % (ref 42–52)
HDLC SERPL-MCNC: 56 MG/DL (ref 35–60)
HGB BLD-MCNC: 12.9 G/DL (ref 14–18)
IMM GRANULOCYTES # BLD AUTO: 0.01 X10(3)/MCL (ref 0–0.04)
IMM GRANULOCYTES NFR BLD AUTO: 0.3 %
KETONES UR QL STRIP.AUTO: NEGATIVE
LDLC SERPL CALC-MCNC: 102 MG/DL (ref 50–140)
LEUKOCYTE ESTERASE UR QL STRIP.AUTO: NEGATIVE
LYMPHOCYTES # BLD AUTO: 1.11 X10(3)/MCL (ref 0.6–4.6)
LYMPHOCYTES NFR BLD AUTO: 35.4 %
MCH RBC QN AUTO: 29.9 PG (ref 27–31)
MCHC RBC AUTO-ENTMCNC: 33.7 G/DL (ref 33–36)
MCV RBC AUTO: 88.7 FL (ref 80–94)
MICROALBUMIN UR-MCNC: 11.3 UG/ML
MONOCYTES # BLD AUTO: 0.35 X10(3)/MCL (ref 0.1–1.3)
MONOCYTES NFR BLD AUTO: 11.1 %
NEUTROPHILS # BLD AUTO: 1.6 X10(3)/MCL (ref 2.1–9.2)
NEUTROPHILS NFR BLD AUTO: 51 %
NITRITE UR QL STRIP.AUTO: NEGATIVE
PH UR STRIP.AUTO: 7.5 [PH]
PLATELET # BLD AUTO: 192 X10(3)/MCL (ref 130–400)
PMV BLD AUTO: 9.2 FL (ref 7.4–10.4)
POTASSIUM SERPL-SCNC: 3.5 MMOL/L (ref 3.5–5.1)
PROT SERPL-MCNC: 6.2 GM/DL (ref 5.8–7.6)
PROT UR QL STRIP.AUTO: NEGATIVE
PSA SERPL-MCNC: 1.48 NG/ML
RBC # BLD AUTO: 4.32 X10(6)/MCL (ref 4.7–6.1)
RBC #/AREA URNS AUTO: NORMAL /HPF
RBC UR QL AUTO: NEGATIVE
SODIUM SERPL-SCNC: 143 MMOL/L (ref 136–145)
SP GR UR STRIP.AUTO: 1.02 (ref 1–1.03)
SQUAMOUS #/AREA URNS AUTO: NORMAL /HPF
T3RU NFR SERPL: 33.86 % (ref 31–39)
T4 SERPL-MCNC: 9.71 UG/DL (ref 4.87–11.72)
TRIGL SERPL-MCNC: 50 MG/DL (ref 34–140)
TSH SERPL-ACNC: 2.09 UIU/ML (ref 0.35–4.94)
UROBILINOGEN UR STRIP-ACNC: >=8
VLDLC SERPL CALC-MCNC: 10 MG/DL
WBC # SPEC AUTO: 3.14 X10(3)/MCL (ref 4.5–11.5)
WBC #/AREA URNS AUTO: NORMAL /HPF

## 2024-04-15 PROCEDURE — 80061 LIPID PANEL: CPT

## 2024-04-15 PROCEDURE — 36415 COLL VENOUS BLD VENIPUNCTURE: CPT

## 2024-04-15 PROCEDURE — 85025 COMPLETE CBC W/AUTO DIFF WBC: CPT

## 2024-04-15 PROCEDURE — 84443 ASSAY THYROID STIM HORMONE: CPT

## 2024-04-15 PROCEDURE — 84436 ASSAY OF TOTAL THYROXINE: CPT

## 2024-04-15 PROCEDURE — 80076 HEPATIC FUNCTION PANEL: CPT

## 2024-04-15 PROCEDURE — 83036 HEMOGLOBIN GLYCOSYLATED A1C: CPT

## 2024-04-15 PROCEDURE — 82043 UR ALBUMIN QUANTITATIVE: CPT

## 2024-04-15 PROCEDURE — 81001 URINALYSIS AUTO W/SCOPE: CPT

## 2024-04-15 PROCEDURE — 80048 BASIC METABOLIC PNL TOTAL CA: CPT

## 2024-04-15 PROCEDURE — 84153 ASSAY OF PSA TOTAL: CPT

## 2024-05-02 NOTE — PROGRESS NOTES
[facility] Surgical - General Surgery Services  [address] [place] [id number]  Phone: [phone]  Fax: [phone]     HISTORY & PHYSICAL  General Surgery  Dr. Jean Tierney      Patient Name: Tashi Deluna  YOB: 1960  Author: Ana María Rogel, ANP     Date: 05/06/2024                   SUBJECTIVE:     Chief Complaint/Reason for Admission:   Chief Complaint   Patient presents with    Post-op Evaluation     Open incisional hernia and open right inguinal hernia repair 2/26/24, re eval swelling         History of Present Illness:  Mr. Tashi Deluna is a 64 y.o. male who presents to clinic for surgical evaluation of abdominal incisional hernia and to re-evaluate post op swelling from recent Lap RIH for large recurrent RIH by Dr. Tierney. He reports the post op swelling to right groin has improved gradually. No complaints at present. Wants to determine when it is appropriate to proceed with incisional hernia repair.     2.5 months s/p Diagnostic laparoscopy, Laparoscopic reduction of right inguinal hernia (large, incarcerated), and Laparoscopic right inguinal hernia repair with mesh TEPP 2/26/24 per Dr. Tierney    Unable to repair incisional hernia at umbilicus at the same time as RIH repair recently due to size of incisional hernia and complexity of RIH repair.     Post-op Diagnosis:  Post-Op Diagnosis Codes:     * Recurrent right inguinal hernia [K40.91]     * Incisional hernia of anterior abdominal wall without obstruction or gangrene [K43.2]     Procedure: Diagnostic laparoscopy, Laparoscopic reduction of right inguinal hernia (large, incarcerated), and Laparoscopic right inguinal hernia repair with mesh TEPP 2/26/24 per Dr. Tierney    Referring provider: No ref. provider found   PCP: Guevara Torres MD     Review of Systems:  12 point ROS negative except as stated in HPI    PAST HISTORY:     Past Medical History:   Diagnosis Date    Atrial fibrillation      CHF (congestive heart failure)     Dysphagia     Fatigue     GERD (gastroesophageal reflux disease)     History of acute pancreatitis     History of anxiety     Incisional hernia of anterior abdominal wall without obstruction or gangrene     Inguinal hernia, bilateral     Neuropathy, diabetic     SOB (shortness of breath)     Type 1 diabetes mellitus     Weakness      Past Surgical History:   Procedure Laterality Date    ABLATION N/A 2022    Procedure: ABLATION;  Surgeon: Kin Hubbard MD;  Location: [facility] CATH LAB;  Service: Cardiology;  Laterality: N/A;  AV NODE ABLATION W/ ANEST.    CARDIAC ELECTROPHYSIOLOGY STUDY AND ABLATION      CARDIOVERSION      COLONOSCOPY      Gastric Ulcer Sx      Dr. aguiar    HERNIA REPAIR Right     Open (6 years old)    INSERTION OF PACEMAKER  2022    REPAIR, HERNIA, INCISIONAL  2024    Procedure: REPAIR, HERNIA, INCISIONAL;  Surgeon: Jean Aguiar MD;  Location: [facility] OR;  Service: General;;  Open abdominal incisional hernia repair with mesh.    REPAIR, HERNIA, INGUINAL Right 2024    Procedure: REPAIR, HERNIA, INGUINAL;  Surgeon: Jean Aguiar MD;  Location: [facility] OR;  Service: General;  Laterality: Right;  Open right inguinal hernia repair with mesh.     Family History   Problem Relation Name Age of Onset    ALS Mother       Social History     Socioeconomic History    Marital status: Single   Tobacco Use    Smoking status: Former     Current packs/day: 0.00     Types: Cigarettes     Quit date:      Years since quittin.3    Smokeless tobacco: Never   Substance and Sexual Activity    Alcohol use: Never    Drug use: Never       MEDICATIONS & ALLERGIES:     Current Outpatient Medications   Medication Sig Dispense Refill    amiodarone (PACERONE) 400 MG tablet Take 0.5 tablets (200 mg total) by mouth 2 (two) times daily. Amiodarone 200mg tablets 400mg twice daily x 3 days then 200mg twice daily x 3 days then 200mg daily      cyanocobalamin,  vitamin B-12, (VITAMIN B-12 ORAL) Take by mouth.      diphenhydrAMINE (BENADRYL ALLERGY) 25 mg tablet Take 25 mg by mouth daily as needed for Allergies.      ELIQUIS 5 mg Tab Take 5 mg by mouth 2 (two) times daily.      ENTRESTO 24-26 mg per tablet Take 1 tablet by mouth 2 (two) times daily.      ergocalciferol, vitamin D2, (VITAMIN D ORAL) Take by mouth.      famotidine (PEPCID) 10 MG tablet Take 10 mg by mouth 2 (two) times daily.      insulin regular 100 unit/mL Inj injection Inject into the skin 3 (three) times daily before meals. Per sliding scale      LANTUS U-100 INSULIN 100 unit/mL injection Inject 10 Units into the skin Daily. Per sliding scale      magnesium oxide 500 mg Tab Take 1 tablet by mouth once daily.      metoprolol succinate (TOPROL-XL) 100 MG 24 hr tablet Take 100 mg by mouth once daily.      omeprazole (PRILOSEC) 20 MG capsule Take 20 mg by mouth daily as needed (acid reflux).      pantoprazole (PROTONIX) 40 MG tablet Take 40 mg by mouth once daily.      rosuvastatin (CRESTOR) 10 MG tablet Take 10 mg by mouth once daily.      spironolactone (ALDACTONE) 25 MG tablet Take 12.5 mg by mouth once daily.      traMADoL (ULTRAM) 50 mg tablet Take 1 tablet (50 mg total) by mouth every 6 (six) hours as needed for Pain. 12 tablet 0    vitamin D (VITAMIN D3) 1000 units Tab Take 1,000 Units by mouth once daily.       No current facility-administered medications for this visit.     Facility-Administered Medications Ordered in Other Visits   Medication Dose Route Frequency Provider Last Rate Last Admin    lactated ringers infusion   Intravenous Continuous Ana María Rogel ANP        levoFLOXacin 500 mg/100 mL IVPB 500 mg  500 mg Intravenous On Call Procedure Ana María Rogel ANP   500 mg at 02/26/24 1245    midazolam (VERSED) 1 mg/mL injection 2 mg  2 mg Intravenous On Call Procedure Ana María Rogel ANP         Review of patient's allergies indicates:   Allergen Reactions    Amoxicillin Rash  "      OBJECTIVE:     Vitals:    05/06/24 1335   BP: 138/84   Pulse: 85   Weight: 68.5 kg (151 lb)   Height: 5' 8" (1.727 m)     Body mass index is 22.96 kg/m².    Physical Exam:  General:  Well developed, well nourished, no acute distress  HEENT:  Normocephalic, atraumatic, PERRL, EOMI, clear sclera, ears normal, neck supple, throat clear without erythema or exudates  CVS:  RRR, S1 and S2 normal, no murmurs, rubs, gallops  Resp:  Lungs clear to auscultation, no wheezes, rales, rhonchi, cough  GI:  incisional hernia periumbilical region area reduces easily Soft, BS+  :  Deferred  MSK:  No muscle atrophy, cyanosis, peripheral edema, full range of motion  Skin:  No rashes, ulcers, erythema  Neuro:  CNII-XII grossly intact  Psych:  Alert and oriented to person, place, and time    Results:  I have independently reviewed all pertinent lab and radiologic studies relevant to general surgery.      VISIT DIAGNOSES:       ICD-10-CM ICD-9-CM   1. Incisional hernia of anterior abdominal wall without obstruction or gangrene  K43.2 553.21   2. Postoperative visit  Z48.89 V58.49       ASSESSMENT/PLAN:       2.5 mos s/p Diagnostic laparoscopy, Laparoscopic reduction of right inguinal hernia (large, incarcerated), and Laparoscopic right inguinal hernia repair with mesh TEPP 2/26/24 per Dr. Tierney     Plan:    - Post op seroma from large right inguinal hernia repair healing well. May take several more weeks to months to completely resolve due to complexity of hernia.   - Defer open incisional hernia repair with scar revision at this time unless becomes emergent.   - FU 4 weeks or sooner if needed.     Dr. Tierney examined patient, discussed recommendations, obtained informed consent, and answered all questions with patient/family.     Counseling included differential diagnoses, treatment options, risks and benefits, lifestyle changes, and prognosis.  The patient was interactive, and verbalized understanding. Patient was able to " ask questions and wishes to proceed.        MARSHA Ramos      I, MARSHA Bowen, am scribing for, and in the presence of, Jean Tierney MD, performed the above scribed service and the documentation accurately describes the services I performed. I attest to the accuracy of the note.

## 2024-05-06 ENCOUNTER — OFFICE VISIT (OUTPATIENT)
Dept: SURGERY | Facility: CLINIC | Age: 64
End: 2024-05-06

## 2024-05-06 VITALS
SYSTOLIC BLOOD PRESSURE: 138 MMHG | WEIGHT: 151 LBS | HEART RATE: 85 BPM | BODY MASS INDEX: 22.88 KG/M2 | DIASTOLIC BLOOD PRESSURE: 84 MMHG | HEIGHT: 68 IN

## 2024-05-06 DIAGNOSIS — K43.2 INCISIONAL HERNIA OF ANTERIOR ABDOMINAL WALL WITHOUT OBSTRUCTION OR GANGRENE: Primary | ICD-10-CM

## 2024-05-06 DIAGNOSIS — Z48.89 POSTOPERATIVE VISIT: ICD-10-CM

## 2024-05-06 PROCEDURE — 99024 POSTOP FOLLOW-UP VISIT: CPT | Mod: ,,, | Performed by: SURGERY

## 2024-06-03 ENCOUNTER — OFFICE VISIT (OUTPATIENT)
Dept: SURGERY | Facility: CLINIC | Age: 64
End: 2024-06-03
Payer: MEDICARE

## 2024-06-03 VITALS
HEART RATE: 93 BPM | SYSTOLIC BLOOD PRESSURE: 143 MMHG | DIASTOLIC BLOOD PRESSURE: 91 MMHG | HEIGHT: 68 IN | BODY MASS INDEX: 22.88 KG/M2 | WEIGHT: 151 LBS

## 2024-06-03 DIAGNOSIS — R10.31 RIGHT LOWER QUADRANT ABDOMINAL PAIN: Primary | ICD-10-CM

## 2024-06-03 DIAGNOSIS — K43.2 INCISIONAL HERNIA OF ANTERIOR ABDOMINAL WALL WITHOUT OBSTRUCTION OR GANGRENE: Primary | ICD-10-CM

## 2024-06-03 DIAGNOSIS — N99.842 POSTOPERATIVE SEROMA INVOLVING GENITOURINARY SYSTEM AFTER GENITOURINARY PROCEDURE: ICD-10-CM

## 2024-06-03 PROCEDURE — 99213 OFFICE O/P EST LOW 20 MIN: CPT | Mod: ,,, | Performed by: SURGERY

## 2024-06-11 ENCOUNTER — HOSPITAL ENCOUNTER (OUTPATIENT)
Dept: RADIOLOGY | Facility: HOSPITAL | Age: 64
Discharge: HOME | End: 2024-06-11
Attending: SURGERY
Payer: MEDICARE

## 2024-06-11 DIAGNOSIS — R10.31 RIGHT LOWER QUADRANT ABDOMINAL PAIN: ICD-10-CM

## 2024-06-11 PROCEDURE — 74176 CT ABD & PELVIS W/O CONTRAST: CPT | Mod: TC

## 2024-06-12 ENCOUNTER — TELEPHONE (OUTPATIENT)
Dept: SURGERY | Facility: CLINIC | Age: 64
End: 2024-06-12
Payer: MEDICARE

## 2024-06-12 PROBLEM — N99.842 POSTOPERATIVE SEROMA INVOLVING GENITOURINARY SYSTEM AFTER GENITOURINARY PROCEDURE: Status: ACTIVE | Noted: 2024-06-12

## 2024-06-12 NOTE — PROGRESS NOTES
[facility] Surgical - General Surgery Services  [address] [place] [address]  Phone: [phone]  Fax: [phone]     HISTORY & PHYSICAL  General Surgery  Dr. Jean Tierney      Patient Name: Tashi Deluna  YOB: 1960  Author: Ana María Rogel, ANP     Date: 6/3/24    SUBJECTIVE:     Chief Complaint/Reason for Admission:   Chief Complaint   Patient presents with    Follow-up     Open Incisional HR/Open Right Ing. HR 2/26/24 - ReEval Swelling        History of Present Illness:  Mr. Tashi Deluna is a 64 y.o. male that presents to clinic for surgical evaluation of post op right groin seroma/hematoma and follow up of incisional hernia.   Last visit in office 5/6/24.  Patient presents to clinic for surgical short term follow up of abdominal incisional hernia and to re-evaluate post op swelling from recent Lap RIH for large recurrent RIH by Dr. Tierney. He reports the post op swelling to right groin has improved gradually.   No complaints at present. Wants to determine when it is appropriate to proceed with incisional hernia repair.      3.5 months s/p Diagnostic laparoscopy, Laparoscopic reduction of right inguinal hernia (large, incarcerated), and Laparoscopic right inguinal hernia repair with mesh TEPP 2/26/24 per Dr. Tierney     Unable to repair incisional hernia at umbilicus at the same time as RIH repair recently due to size of incisional hernia and complexity of RIH repair.      Post-op Diagnosis:  Post-Op Diagnosis Codes:     * Recurrent right inguinal hernia [K40.91]     * Incisional hernia of anterior abdominal wall without obstruction or gangrene [K43.2]     Procedure: Diagnostic laparoscopy, Laparoscopic reduction of right inguinal hernia (large, incarcerated), and Laparoscopic right inguinal hernia repair with mesh TEPP 2/26/24 per Dr. Tierney     Referring provider: No ref. provider found   PCP: Guevara Torres MD    Referring provider: No ref.  provider found   PCP: Guevara Torres MD     Review of Systems:  12 point ROS negative except as stated in HPI    PAST HISTORY:     Past Medical History:   Diagnosis Date    Atrial fibrillation     CHF (congestive heart failure)     Dysphagia     Fatigue     GERD (gastroesophageal reflux disease)     History of acute pancreatitis     History of anxiety     Incisional hernia of anterior abdominal wall without obstruction or gangrene     Inguinal hernia, bilateral     Neuropathy, diabetic     SOB (shortness of breath)     Type 1 diabetes mellitus     Weakness      Past Surgical History:   Procedure Laterality Date    ABLATION N/A 2022    Procedure: ABLATION;  Surgeon: Kin Hubbard MD;  Location: [facility] CATH LAB;  Service: Cardiology;  Laterality: N/A;  AV NODE ABLATION W/ ANEST.    CARDIAC ELECTROPHYSIOLOGY STUDY AND ABLATION      CARDIOVERSION      COLONOSCOPY      Gastric Ulcer Sx      Dr. aguiar    HERNIA REPAIR Right     Open (6 years old)    INSERTION OF PACEMAKER  2022    REPAIR, HERNIA, INCISIONAL  2024    Procedure: REPAIR, HERNIA, INCISIONAL;  Surgeon: Jean Aguiar MD;  Location: [facility] OR;  Service: General;;  Open abdominal incisional hernia repair with mesh.    REPAIR, HERNIA, INGUINAL Right 2024    Procedure: REPAIR, HERNIA, INGUINAL;  Surgeon: Jean Aguiar MD;  Location: [facility] OR;  Service: General;  Laterality: Right;  Open right inguinal hernia repair with mesh.     Family History   Problem Relation Name Age of Onset    ALS Mother       Social History     Socioeconomic History    Marital status: Single   Tobacco Use    Smoking status: Former     Current packs/day: 0.00     Types: Cigarettes     Quit date:      Years since quittin.4    Smokeless tobacco: Never   Substance and Sexual Activity    Alcohol use: Never    Drug use: Never       MEDICATIONS & ALLERGIES:     Current Outpatient Medications on File Prior to Visit   Medication Sig    amiodarone  "(PACERONE) 400 MG tablet Take 0.5 tablets (200 mg total) by mouth 2 (two) times daily. Amiodarone 200mg tablets 400mg twice daily x 3 days then 200mg twice daily x 3 days then 200mg daily    cyanocobalamin, vitamin B-12, (VITAMIN B-12 ORAL) Take by mouth.    diphenhydrAMINE (BENADRYL ALLERGY) 25 mg tablet Take 25 mg by mouth daily as needed for Allergies.    ELIQUIS 5 mg Tab Take 5 mg by mouth 2 (two) times daily.    ENTRESTO 24-26 mg per tablet Take 1 tablet by mouth 2 (two) times daily.    ergocalciferol, vitamin D2, (VITAMIN D ORAL) Take by mouth.    famotidine (PEPCID) 10 MG tablet Take 10 mg by mouth 2 (two) times daily.    insulin regular 100 unit/mL Inj injection Inject into the skin 3 (three) times daily before meals. Per sliding scale    LANTUS U-100 INSULIN 100 unit/mL injection Inject 10 Units into the skin Daily. Per sliding scale    magnesium oxide 500 mg Tab Take 1 tablet by mouth once daily.    metoprolol succinate (TOPROL-XL) 100 MG 24 hr tablet Take 100 mg by mouth once daily.    omeprazole (PRILOSEC) 20 MG capsule Take 20 mg by mouth daily as needed (acid reflux).    pantoprazole (PROTONIX) 40 MG tablet Take 40 mg by mouth once daily.    rosuvastatin (CRESTOR) 10 MG tablet Take 10 mg by mouth once daily.    spironolactone (ALDACTONE) 25 MG tablet Take 12.5 mg by mouth once daily.    traMADoL (ULTRAM) 50 mg tablet Take 1 tablet (50 mg total) by mouth every 6 (six) hours as needed for Pain.    vitamin D (VITAMIN D3) 1000 units Tab Take 1,000 Units by mouth once daily.     Current Facility-Administered Medications on File Prior to Visit   Medication    lactated ringers infusion    levoFLOXacin 500 mg/100 mL IVPB 500 mg    midazolam (VERSED) 1 mg/mL injection 2 mg     Review of patient's allergies indicates:   Allergen Reactions    Amoxicillin Rash       OBJECTIVE:     Vitals:    06/03/24 1427   BP: (!) 143/91   Pulse: 93   Weight: 68.5 kg (151 lb 0.2 oz)   Height: 5' 8" (1.727 m)     Body mass index " is 22.96 kg/m².    Physical Exam:  General:  Well developed, well nourished, no acute distress  HEENT:  Normocephalic, atraumatic, PERRL, EOMI, clear sclera, ears normal, neck supple, throat clear without erythema or exudates  CVS:  RRR, S1 and S2 normal, no murmurs, rubs, gallops  Resp:  Lungs clear to auscultation, no wheezes, rales, rhonchi, cough  GI:   incisional hernia periumbilical region area reduces easily Soft, BS+ .   :  Post op seroma/hematoma to right groin moderate size, soft, NT.   MSK:  No muscle atrophy, cyanosis, peripheral edema, full range of motion  Skin:  No rashes, ulcers, erythema  Neuro:  CNII-XII grossly intact  Psych:  Alert and oriented to person, place, and time    Results:  I have independently reviewed all pertinent lab and radiologic studies relevant to general surgery.      VISIT DIAGNOSES:       ICD-10-CM ICD-9-CM   1. Incisional hernia of anterior abdominal wall without obstruction or gangrene  K43.2 553.21   2. Postoperative seroma involving genitourinary system after genitourinary procedure  N99.842 998.13       ASSESSMENT/PLAN:     3.5 mos s/p Diagnostic laparoscopy, Laparoscopic reduction of right inguinal hernia (large, incarcerated), and Laparoscopic right inguinal hernia repair with mesh TEPP 2/26/24 per Dr. Tierney      Plan:    - Post op seroma from large right inguinal hernia repair healing well. May take several more weeks to months to completely resolve due to complexity of hernia.   - Defer open incisional hernia repair with scar revision at this time unless becomes emergent.   - Recommend CT imaging of abdomen and pelvis to re-evaluate incisional hernia and to recheck healing of inguinal repair/seroma/hematoma. Order CT abdomen/pelvis without contrast. Will call with results.      Dr. Tierney examined patient, discussed recommendations, and answered all questions with patient/family.      Counseling included differential diagnoses, treatment options, risks and  benefits, lifestyle changes, and prognosis.  The patient was interactive, and verbalized understanding. Patient was able to ask questions and wishes to proceed.          MARSHA Ramos        I, MARSHA Bowen, am scribing for, and in the presence of, Jean Tierney MD, performed the above scribed service and the documentation accurately describes the services I performed. I attest to the accuracy of the note.

## 2024-06-12 NOTE — TELEPHONE ENCOUNTER
----- Message from Megan Hill MA sent at 6/4/2024  4:09 PM CDT -----  Regarding: Results FU  CT scheduled 6/11

## 2024-06-12 NOTE — TELEPHONE ENCOUNTER
Yes. Reviewed CT report. Large incisional hernia noted, no evidence of obstruction. Post op fluid collection noted in right groin c/w post op seroma/hematoma.   Please inform patient that I will have Dr. Tierney review his CT films once he returns to office to determine when ok to proceed with incisional hernia repair. In the meantime, if he develops abd pain, NV, or fever, he should call us ASAP or present to ED.

## 2024-06-14 NOTE — PROGRESS NOTES
Dr. Tierney reviewed films today. Recommendations as follows:  - Large hematoma/post op seroma to right groin without evidence of right inguinal hernia recurrence.   - Stable incisional hernia without significant change.   - Small left inguinal hernia also present.   - Please arrange a follow up appointment to discuss evacuation of post op fluid collection from right groin, possibly could repair small left inguinal hernia at the same time. Please inform patient and let me know if he has any questions or concerns.

## 2024-06-17 ENCOUNTER — TELEPHONE (OUTPATIENT)
Dept: SURGERY | Facility: CLINIC | Age: 64
End: 2024-06-17
Payer: MEDICARE

## 2024-06-17 NOTE — TELEPHONE ENCOUNTER
----- Message from Megan Hill MA sent at 2024 10:43 AM CDT -----  Regarding: FW: [employer]  Returned call. No answer, LVM  Please schedule preop appt when pt calls back  ----- Message -----  From: Alberta Lemon  Sent: 2024   8:40 AM CDT  To: Kelsy Pena Staff  Subject: [employer]                                          Please review and address [employer] - create encounter when completed     ======================================  Fri 24 01:51p TAKEN  To......: Office  Name....: Tashi Deluna  Phone #.: [phone]  Patient.: Same  Pt .: 1960  Primary.: Dr Tono Tierney  Details.: RYC reg results      Call Type Details: Office -

## 2024-06-20 ENCOUNTER — OFFICE VISIT (OUTPATIENT)
Dept: SURGERY | Facility: CLINIC | Age: 64
End: 2024-06-20
Payer: MEDICARE

## 2024-06-20 ENCOUNTER — LAB VISIT (OUTPATIENT)
Dept: LAB | Facility: HOSPITAL | Age: 64
End: 2024-06-20
Attending: SURGERY
Payer: MEDICARE

## 2024-06-20 VITALS
BODY MASS INDEX: 21.72 KG/M2 | HEIGHT: 68 IN | SYSTOLIC BLOOD PRESSURE: 151 MMHG | HEART RATE: 103 BPM | WEIGHT: 143.31 LBS | DIASTOLIC BLOOD PRESSURE: 91 MMHG

## 2024-06-20 DIAGNOSIS — Z01.818 PREOP EXAMINATION: ICD-10-CM

## 2024-06-20 DIAGNOSIS — Z01.818 PREOP EXAMINATION: Primary | ICD-10-CM

## 2024-06-20 LAB
ALBUMIN SERPL-MCNC: 3.7 G/DL (ref 3.4–4.8)
ALBUMIN/GLOB SERPL: 1.2 RATIO (ref 1.1–2)
ALP SERPL-CCNC: 84 UNIT/L (ref 40–150)
ALT SERPL-CCNC: 6 UNIT/L (ref 0–55)
ANION GAP SERPL CALC-SCNC: 10 MEQ/L
AST SERPL-CCNC: 14 UNIT/L (ref 5–34)
BASOPHILS # BLD AUTO: 0.03 X10(3)/MCL
BASOPHILS NFR BLD AUTO: 0.8 %
BILIRUB SERPL-MCNC: 2.5 MG/DL
BUN SERPL-MCNC: 9.1 MG/DL (ref 8.4–25.7)
CALCIUM SERPL-MCNC: 8.9 MG/DL (ref 8.8–10)
CHLORIDE SERPL-SCNC: 100 MMOL/L (ref 98–107)
CO2 SERPL-SCNC: 29 MMOL/L (ref 23–31)
CREAT SERPL-MCNC: 0.83 MG/DL (ref 0.73–1.18)
CREAT/UREA NIT SERPL: 11
EOSINOPHIL # BLD AUTO: 0.02 X10(3)/MCL (ref 0–0.9)
EOSINOPHIL NFR BLD AUTO: 0.5 %
ERYTHROCYTE [DISTWIDTH] IN BLOOD BY AUTOMATED COUNT: 13.2 % (ref 11.5–17)
GFR SERPLBLD CREATININE-BSD FMLA CKD-EPI: >60 ML/MIN/1.73/M2
GLOBULIN SER-MCNC: 3 GM/DL (ref 2.4–3.5)
GLUCOSE SERPL-MCNC: 193 MG/DL (ref 82–115)
HCT VFR BLD AUTO: 41.4 % (ref 42–52)
HGB BLD-MCNC: 13.9 G/DL (ref 14–18)
IMM GRANULOCYTES # BLD AUTO: 0.01 X10(3)/MCL (ref 0–0.04)
IMM GRANULOCYTES NFR BLD AUTO: 0.3 %
LYMPHOCYTES # BLD AUTO: 0.96 X10(3)/MCL (ref 0.6–4.6)
LYMPHOCYTES NFR BLD AUTO: 24.9 %
MCH RBC QN AUTO: 28.5 PG (ref 27–31)
MCHC RBC AUTO-ENTMCNC: 33.6 G/DL (ref 33–36)
MCV RBC AUTO: 84.8 FL (ref 80–94)
MONOCYTES # BLD AUTO: 0.28 X10(3)/MCL (ref 0.1–1.3)
MONOCYTES NFR BLD AUTO: 7.3 %
NEUTROPHILS # BLD AUTO: 2.56 X10(3)/MCL (ref 2.1–9.2)
NEUTROPHILS NFR BLD AUTO: 66.2 %
NRBC BLD AUTO-RTO: 0 %
PLATELET # BLD AUTO: 148 X10(3)/MCL (ref 130–400)
PMV BLD AUTO: 9.2 FL (ref 7.4–10.4)
POTASSIUM SERPL-SCNC: 3.7 MMOL/L (ref 3.5–5.1)
PROT SERPL-MCNC: 6.7 GM/DL (ref 5.8–7.6)
RBC # BLD AUTO: 4.88 X10(6)/MCL (ref 4.7–6.1)
SODIUM SERPL-SCNC: 139 MMOL/L (ref 136–145)
WBC # BLD AUTO: 3.86 X10(3)/MCL (ref 4.5–11.5)

## 2024-06-20 PROCEDURE — 93010 ELECTROCARDIOGRAM REPORT: CPT | Mod: ,,, | Performed by: INTERNAL MEDICINE

## 2024-06-20 PROCEDURE — 99214 OFFICE O/P EST MOD 30 MIN: CPT | Mod: ,,, | Performed by: SURGERY

## 2024-06-20 PROCEDURE — 93005 ELECTROCARDIOGRAM TRACING: CPT

## 2024-06-20 PROCEDURE — 80053 COMPREHEN METABOLIC PANEL: CPT

## 2024-06-20 PROCEDURE — 85025 COMPLETE CBC W/AUTO DIFF WBC: CPT

## 2024-06-20 PROCEDURE — 36415 COLL VENOUS BLD VENIPUNCTURE: CPT

## 2024-06-21 LAB
OHS QRS DURATION: 136 MS
OHS QTC CALCULATION: 511 MS

## 2024-06-23 NOTE — PROGRESS NOTES
[facility] Surgical - General Surgery Services  [address] [place] [address]  Phone: [phone]  Fax: [phone]     HISTORY & PHYSICAL  General Surgery  Dr. Jean Tierney      Patient Name: Tashi Deluna  YOB: 1960  Author: Jean Tierney MD     Date: 6/20/24          SUBJECTIVE:     Chief Complaint/Reason for Admission:   Chief Complaint   Patient presents with    Pre-op Exam     insicional hernia - Discuss CT results and possible evacuation of post op fluid collection from right groin, possibly could repair small left inguinal hernia        History of Present Illness:  Mr. Tashi Deluna is a 64 y.o. male that presents to clinic for surgical evaluation of right scrotal hematoma.  SP Lap R inguinal. CT confirms hematoma, no evidence of recurrence.     Referring provider: No ref. provider found   PCP: Guevara Torres MD     Review of Systems:  12 point ROS negative except as stated in HPI    PAST HISTORY:     Past Medical History:   Diagnosis Date    Atrial fibrillation     CHF (congestive heart failure)     Dysphagia     Fatigue     GERD (gastroesophageal reflux disease)     History of acute pancreatitis     History of anxiety     Incisional hernia of anterior abdominal wall without obstruction or gangrene     Inguinal hernia, bilateral     Neuropathy, diabetic     SOB (shortness of breath)     Type 1 diabetes mellitus     Weakness      Past Surgical History:   Procedure Laterality Date    ABLATION N/A 07/05/2022    Procedure: ABLATION;  Surgeon: Kin Hubbard MD;  Location: [facility] CATH LAB;  Service: Cardiology;  Laterality: N/A;  AV NODE ABLATION W/ ANEST.    CARDIAC ELECTROPHYSIOLOGY STUDY AND ABLATION      CARDIOVERSION      COLONOSCOPY  2021    Gastric Ulcer Sx  2009    Dr. tierney    HERNIA REPAIR Right     Open (6 years old)    INSERTION OF PACEMAKER  04/2022    REPAIR, HERNIA, INCISIONAL  2/26/2024    Procedure: REPAIR, HERNIA, INCISIONAL;  Surgeon:  Jean Tierney MD;  Location: [facility] OR;  Service: General;;  Open abdominal incisional hernia repair with mesh.    REPAIR, HERNIA, INGUINAL Right 2024    Procedure: REPAIR, HERNIA, INGUINAL;  Surgeon: Jean Tierney MD;  Location: [facility] OR;  Service: General;  Laterality: Right;  Open right inguinal hernia repair with mesh.     Family History   Problem Relation Name Age of Onset    ALS Mother       Social History     Socioeconomic History    Marital status: Single   Tobacco Use    Smoking status: Former     Current packs/day: 0.00     Types: Cigarettes     Quit date:      Years since quittin.4    Smokeless tobacco: Never   Substance and Sexual Activity    Alcohol use: Never    Drug use: Never       MEDICATIONS & ALLERGIES:     Current Outpatient Medications on File Prior to Visit   Medication Sig    amiodarone (PACERONE) 400 MG tablet Take 0.5 tablets (200 mg total) by mouth 2 (two) times daily. Amiodarone 200mg tablets 400mg twice daily x 3 days then 200mg twice daily x 3 days then 200mg daily    cyanocobalamin, vitamin B-12, (VITAMIN B-12 ORAL) Take by mouth.    diphenhydrAMINE (BENADRYL ALLERGY) 25 mg tablet Take 25 mg by mouth daily as needed for Allergies.    ELIQUIS 5 mg Tab Take 5 mg by mouth 2 (two) times daily.    ENTRESTO 24-26 mg per tablet Take 1 tablet by mouth 2 (two) times daily.    ergocalciferol, vitamin D2, (VITAMIN D ORAL) Take by mouth.    famotidine (PEPCID) 10 MG tablet Take 10 mg by mouth 2 (two) times daily.    insulin regular 100 unit/mL Inj injection Inject into the skin 3 (three) times daily before meals. Per sliding scale    LANTUS U-100 INSULIN 100 unit/mL injection Inject 10 Units into the skin Daily. Per sliding scale    magnesium oxide 500 mg Tab Take 1 tablet by mouth once daily.    metoprolol succinate (TOPROL-XL) 100 MG 24 hr tablet Take 100 mg by mouth once daily.    omeprazole (PRILOSEC) 20 MG capsule Take 20 mg by mouth daily as needed (acid reflux).     "pantoprazole (PROTONIX) 40 MG tablet Take 40 mg by mouth once daily.    rosuvastatin (CRESTOR) 10 MG tablet Take 10 mg by mouth once daily.    spironolactone (ALDACTONE) 25 MG tablet Take 12.5 mg by mouth once daily.    traMADoL (ULTRAM) 50 mg tablet Take 1 tablet (50 mg total) by mouth every 6 (six) hours as needed for Pain. (Patient not taking: Reported on 6/20/2024)    vitamin D (VITAMIN D3) 1000 units Tab Take 1,000 Units by mouth once daily.     Current Facility-Administered Medications on File Prior to Visit   Medication    lactated ringers infusion    levoFLOXacin 500 mg/100 mL IVPB 500 mg    midazolam (VERSED) 1 mg/mL injection 2 mg     Review of patient's allergies indicates:   Allergen Reactions    Amoxicillin Rash       OBJECTIVE:     Vitals:    06/20/24 1309   BP: (!) 151/91   Pulse: 103   Weight: 65 kg (143 lb 4.8 oz)   Height: 5' 8" (1.727 m)     Body mass index is 21.79 kg/m².    Physical Exam:  General:  Well developed, well nourished, no acute distress  HEENT:  Normocephalic, atraumatic, PERRL, EOMI, clear sclera, ears normal, neck supple, throat clear without erythema or exudates  CVS:  RRR, S1 and S2 normal, no murmurs, rubs, gallops  Resp:  Lungs clear to auscultation, no wheezes, rales, rhonchi, cough  GI:  Abdomen soft, non-tender, non-distended, normoactive bowel sounds, no masses  :  large scrotal hematoma  MSK:  No muscle atrophy, cyanosis, peripheral edema, full range of motion  Skin:  No rashes, ulcers, erythema  Neuro:  CNII-XII grossly intact  Psych:  Alert and oriented to person, place, and time    Results:  I have independently reviewed all pertinent lab and radiologic studies relevant to general surgery.      VISIT DIAGNOSES:       ICD-10-CM ICD-9-CM   1. Preop examination  Z01.818 V72.84       ASSESSMENT/PLAN:     Scrotal hematoma    Plan: evacuation of right scrotal hematoma  Dr. Tierney examined patient, discussed recommendations, obtained informed consent, and answered all " questions with patient/family.     Jean Tierney MD

## 2024-06-25 DIAGNOSIS — N99.842 POSTOPERATIVE SEROMA INVOLVING GENITOURINARY SYSTEM AFTER GENITOURINARY PROCEDURE: Primary | ICD-10-CM

## 2024-06-26 ENCOUNTER — TELEPHONE (OUTPATIENT)
Dept: SURGERY | Facility: CLINIC | Age: 64
End: 2024-06-26

## 2024-06-26 NOTE — TELEPHONE ENCOUNTER
----- Message from Maria De Jesus Yun sent at 6/26/2024  9:09 AM CDT -----  Regarding: RE: R/S Surgery  Yes pleae make him one- 2 weeks after if fine  ----- Message -----  From: Isidoro Kign  Sent: 6/26/2024   9:02 AM CDT  To: Kelsy Pena Staff  Subject: R/S Surgery                                      Patient returned call to move surgery.   He is okay with moving to 7/29  Does he need a post op? Nothing is scheduled at this time so I can put him somewhere if needed just let me know?

## 2024-07-22 ENCOUNTER — TELEPHONE (OUTPATIENT)
Dept: SURGERY | Facility: CLINIC | Age: 64
End: 2024-07-22
Payer: MEDICARE

## 2024-07-22 NOTE — TELEPHONE ENCOUNTER
----- Message from Megan Hill MA sent at 7/16/2024  1:51 PM CDT -----  Regarding: Blood thinners  Contact pt to ensure he stops his Eliquis 3 days preop   SX - 7/29

## 2024-07-23 PROBLEM — N99.840 POSTOPERATIVE HEMATOMA INVOLVING GENITOURINARY SYSTEM FOLLOWING GENITOURINARY PROCEDURE: Status: ACTIVE | Noted: 2024-07-23

## 2024-07-23 NOTE — H&P
[facility] Surgical - Periop Services  General Surgery  History & Physical    Patient Name: Tashi Deluna  MRN: [id number]  Admission Date: 7/29/2024  Attending Physician: Jean Tierney MD   Primary Care Provider: Guevara Torres MD    Patient information was obtained from patient and past medical records.     Subjective:     Chief Complaint/Reason for Admission: Right scrotal hematoma    History of Present Illness:  Patient is a 64 y.o. male presents for evacuation of right scrotal hematoma. No changes in medical history since last office visit 6/20/2024.    Clinical information from last office visit:  Mr. Tashi Deluna is a 64 y.o. male that presents to clinic for surgical evaluation of post op right groin seroma/hematoma and follow up of incisional hernia. CT confirms post op hematoma, no recurrence of hernia.     5 months s/p Diagnostic laparoscopy, Laparoscopic reduction of right inguinal hernia (large, incarcerated), and Laparoscopic right inguinal hernia repair with mesh TEPP 2/26/24 per Dr. Tierney     Unable to repair incisional hernia at umbilicus at the same time as RIH repair recently due to size of incisional hernia and complexity of RIH repair.      Post-op Diagnosis:  Post-Op Diagnosis Codes:     * Recurrent right inguinal hernia [K40.91]     * Incisional hernia of anterior abdominal wall without obstruction or gangrene [K43.2]     Procedure: Diagnostic laparoscopy, Laparoscopic reduction of right inguinal hernia (large, incarcerated), and Laparoscopic right inguinal hernia repair with mesh TEPP 2/26/24 per Dr. Tierney     PCP: Guevara Torres MD    Current Facility-Administered Medications on File Prior to Encounter   Medication    lactated ringers infusion    levoFLOXacin 500 mg/100 mL IVPB 500 mg    midazolam (VERSED) 1 mg/mL injection 2 mg     Current Outpatient Medications on File Prior to Encounter   Medication Sig    cyanocobalamin, vitamin B-12, (VITAMIN B-12 ORAL) Take by  mouth.    diphenhydrAMINE (BENADRYL ALLERGY) 25 mg tablet Take 25 mg by mouth daily as needed for Allergies.    ELIQUIS 5 mg Tab Take 5 mg by mouth 2 (two) times daily.    ENTRESTO 24-26 mg per tablet Take 1 tablet by mouth 2 (two) times daily.    ergocalciferol, vitamin D2, (VITAMIN D ORAL) Take by mouth.    famotidine (PEPCID) 10 MG tablet Take 10 mg by mouth 2 (two) times daily.    insulin regular 100 unit/mL Inj injection Inject into the skin 3 (three) times daily before meals. Per sliding scale    LANTUS U-100 INSULIN 100 unit/mL injection Inject 10 Units into the skin Daily. Per sliding scale    magnesium oxide 500 mg Tab Take 1 tablet by mouth once daily.    metoprolol succinate (TOPROL-XL) 100 MG 24 hr tablet Take 100 mg by mouth once daily.    rosuvastatin (CRESTOR) 10 MG tablet Take 10 mg by mouth once daily.    vitamin D (VITAMIN D3) 1000 units Tab Take 1,000 Units by mouth once daily.    amiodarone (PACERONE) 400 MG tablet Take 0.5 tablets (200 mg total) by mouth 2 (two) times daily. Amiodarone 200mg tablets 400mg twice daily x 3 days then 200mg twice daily x 3 days then 200mg daily    omeprazole (PRILOSEC) 20 MG capsule Take 20 mg by mouth daily as needed (acid reflux).    pantoprazole (PROTONIX) 40 MG tablet Take 40 mg by mouth once daily.    spironolactone (ALDACTONE) 25 MG tablet Take 12.5 mg by mouth once daily.    traMADoL (ULTRAM) 50 mg tablet Take 1 tablet (50 mg total) by mouth every 6 (six) hours as needed for Pain. (Patient not taking: Reported on 6/20/2024)       Review of patient's allergies indicates:   Allergen Reactions    Amoxicillin Rash       Past Medical History:   Diagnosis Date    Atrial fibrillation     CHF (congestive heart failure)     Dysphagia     Fatigue     GERD (gastroesophageal reflux disease)     History of acute pancreatitis     History of anxiety     Incisional hernia of anterior abdominal wall without obstruction or gangrene     Inguinal hernia, bilateral      Neuropathy, diabetic     SOB (shortness of breath)     Type 1 diabetes mellitus     Weakness      Past Surgical History:   Procedure Laterality Date    ABLATION N/A 2022    Procedure: ABLATION;  Surgeon: Kin Hubbard MD;  Location: [facility] CATH LAB;  Service: Cardiology;  Laterality: N/A;  AV NODE ABLATION W/ ANEST.    CARDIAC ELECTROPHYSIOLOGY STUDY AND ABLATION      CARDIOVERSION      COLONOSCOPY      Gastric Ulcer Sx      Dr. aguiar    HERNIA REPAIR Right     Open (6 years old)    INSERTION OF PACEMAKER  2022    REPAIR, HERNIA, INCISIONAL  2024    Procedure: REPAIR, HERNIA, INCISIONAL;  Surgeon: Jean Aguiar MD;  Location: [facility] OR;  Service: General;;  Open abdominal incisional hernia repair with mesh.    REPAIR, HERNIA, INGUINAL Right 2024    Procedure: REPAIR, HERNIA, INGUINAL;  Surgeon: Jean Aguiar MD;  Location: [facility] OR;  Service: General;  Laterality: Right;  Open right inguinal hernia repair with mesh.     Family History       Problem Relation (Age of Onset)    ALS Mother    Cancer Father          Tobacco Use    Smoking status: Former     Current packs/day: 0.00     Types: Cigarettes     Quit date:      Years since quittin.5    Smokeless tobacco: Never   Substance and Sexual Activity    Alcohol use: Never    Drug use: Never    Sexual activity: Not on file     Review of Systems   Constitutional: Negative.    HENT: Negative.     Eyes: Negative.    Respiratory: Negative.     Cardiovascular: Negative.    Gastrointestinal: Negative.    Endocrine: Negative.    Genitourinary: Negative.    Musculoskeletal: Negative.    Skin: Negative.    Allergic/Immunologic: Negative.    Neurological: Negative.    Psychiatric/Behavioral: Negative.     All other systems reviewed and are negative.    Objective:     Vital Signs (Most Recent):    Vital Signs (24h Range):        Weight: 67.1 kg (148 lb)  Body mass index is 21.86 kg/m².    Physical Exam  Vitals reviewed.   Constitutional:        General: He is not in acute distress.     Appearance: Normal appearance.   HENT:      Head: Normocephalic.   Eyes:      Conjunctiva/sclera: Conjunctivae normal.      Pupils: Pupils are equal, round, and reactive to light.   Cardiovascular:      Rate and Rhythm: Normal rate and regular rhythm.      Pulses: Normal pulses.      Heart sounds: Normal heart sounds.   Pulmonary:      Effort: Pulmonary effort is normal. No respiratory distress.      Breath sounds: Normal breath sounds.   Abdominal:      General: Abdomen is flat. Bowel sounds are normal.      Palpations: Abdomen is soft.      Tenderness: There is no abdominal tenderness.   Genitourinary:     Comments: + right scrotal hematoma  Musculoskeletal:         General: Normal range of motion.      Cervical back: Neck supple.   Skin:     General: Skin is warm and dry.   Neurological:      General: No focal deficit present.      Mental Status: He is alert and oriented to person, place, and time.   Psychiatric:         Mood and Affect: Mood normal.         Behavior: Behavior normal.         Significant Labs:  I have reviewed all pertinent lab results within the past 24 hours.    Significant Diagnostics:  I have reviewed all pertinent imaging results/findings within the past 24 hours.    Assessment/Plan:     Active Diagnoses:    Diagnosis Date Noted POA    PRINCIPAL PROBLEM:  Postoperative hematoma involving genitourinary system following genitourinary procedure [N99.840] 07/23/2024 Yes      Problems Resolved During this Admission:     VTE Risk Mitigation (From admission, onward)      None        Scrotal hematoma     Plan: evacuation of right scrotal hematoma    Dr. Tierney examined patient, discussed recommendations, obtained informed consent, and answered all questions with patient/family.       IAna María ANP, am scribing for, and in the presence of, Jean Tierney MD, performed the above scribed service and the documentation accurately describes the  services I performed. I attest to the accuracy of the note.      Ana María Rogel, ANP  General Surgery  [facility] - Periop Services

## 2024-07-29 ENCOUNTER — HOSPITAL ENCOUNTER (OUTPATIENT)
Facility: HOSPITAL | Age: 64
Discharge: HOME | End: 2024-07-29
Attending: SURGERY | Admitting: SURGERY
Payer: MEDICARE

## 2024-07-29 ENCOUNTER — ANESTHESIA (OUTPATIENT)
Dept: SURGERY | Facility: HOSPITAL | Age: 64
End: 2024-07-29
Payer: MEDICARE

## 2024-07-29 ENCOUNTER — ANESTHESIA EVENT (OUTPATIENT)
Dept: SURGERY | Facility: HOSPITAL | Age: 64
End: 2024-07-29
Payer: MEDICARE

## 2024-07-29 DIAGNOSIS — N99.840 POSTOPERATIVE HEMATOMA INVOLVING GENITOURINARY SYSTEM FOLLOWING GENITOURINARY PROCEDURE: ICD-10-CM

## 2024-07-29 LAB — POCT GLUCOSE: 107 MG/DL (ref 70–110)

## 2024-07-29 PROCEDURE — 71000015 HC POSTOP RECOV 1ST HR: Performed by: SURGERY

## 2024-07-29 PROCEDURE — 25000003 PHARM REV CODE 250: Performed by: NURSE ANESTHETIST, CERTIFIED REGISTERED

## 2024-07-29 PROCEDURE — 71000033 HC RECOVERY, INTIAL HOUR: Performed by: SURGERY

## 2024-07-29 PROCEDURE — 36000707: Performed by: SURGERY

## 2024-07-29 PROCEDURE — 63600175 PHARM REV CODE 636 W HCPCS: Performed by: NURSE ANESTHETIST, CERTIFIED REGISTERED

## 2024-07-29 PROCEDURE — 37000008 HC ANESTHESIA 1ST 15 MINUTES: Performed by: SURGERY

## 2024-07-29 PROCEDURE — 63600175 PHARM REV CODE 636 W HCPCS: Performed by: NURSE PRACTITIONER

## 2024-07-29 PROCEDURE — 71000016 HC POSTOP RECOV ADDL HR: Performed by: SURGERY

## 2024-07-29 PROCEDURE — 63600175 PHARM REV CODE 636 W HCPCS

## 2024-07-29 PROCEDURE — 82962 GLUCOSE BLOOD TEST: CPT | Performed by: SURGERY

## 2024-07-29 PROCEDURE — 37000009 HC ANESTHESIA EA ADD 15 MINS: Performed by: SURGERY

## 2024-07-29 PROCEDURE — 36000706: Performed by: SURGERY

## 2024-07-29 PROCEDURE — 63600175 PHARM REV CODE 636 W HCPCS: Performed by: ANESTHESIOLOGY

## 2024-07-29 PROCEDURE — 25000003 PHARM REV CODE 250: Performed by: NURSE PRACTITIONER

## 2024-07-29 PROCEDURE — 71000039 HC RECOVERY, EACH ADD'L HOUR: Performed by: SURGERY

## 2024-07-29 PROCEDURE — 54700 I&D EPDIDYMS TSTIS&/SCROT SP: CPT | Mod: RT,,, | Performed by: SURGERY

## 2024-07-29 PROCEDURE — 25000003 PHARM REV CODE 250: Performed by: SURGERY

## 2024-07-29 RX ORDER — HYDRALAZINE HYDROCHLORIDE 20 MG/ML
10 INJECTION INTRAMUSCULAR; INTRAVENOUS
Status: DISCONTINUED | OUTPATIENT
Start: 2024-07-29 | End: 2024-07-29 | Stop reason: HOSPADM

## 2024-07-29 RX ORDER — GABAPENTIN 300 MG/1
300 CAPSULE ORAL
Status: COMPLETED | OUTPATIENT
Start: 2024-07-29 | End: 2024-07-29

## 2024-07-29 RX ORDER — PROMETHAZINE HYDROCHLORIDE 25 MG/ML
12.5 INJECTION, SOLUTION INTRAMUSCULAR; INTRAVENOUS EVERY 6 HOURS PRN
Status: DISCONTINUED | OUTPATIENT
Start: 2024-07-29 | End: 2024-07-29 | Stop reason: HOSPADM

## 2024-07-29 RX ORDER — PHENYLEPHRINE HYDROCHLORIDE 10 MG/ML
INJECTION INTRAVENOUS
Status: DISCONTINUED | OUTPATIENT
Start: 2024-07-29 | End: 2024-07-29

## 2024-07-29 RX ORDER — HYDROMORPHONE HYDROCHLORIDE 2 MG/ML
0.4 INJECTION, SOLUTION INTRAMUSCULAR; INTRAVENOUS; SUBCUTANEOUS EVERY 5 MIN PRN
Status: DISCONTINUED | OUTPATIENT
Start: 2024-07-29 | End: 2024-07-29 | Stop reason: HOSPADM

## 2024-07-29 RX ORDER — ONDANSETRON 4 MG/1
4 TABLET, ORALLY DISINTEGRATING ORAL
Status: COMPLETED | OUTPATIENT
Start: 2024-07-29 | End: 2024-07-29

## 2024-07-29 RX ORDER — HYDRALAZINE HYDROCHLORIDE 20 MG/ML
INJECTION INTRAMUSCULAR; INTRAVENOUS
Status: COMPLETED
Start: 2024-07-29 | End: 2024-07-29

## 2024-07-29 RX ORDER — ONDANSETRON HYDROCHLORIDE 2 MG/ML
4 INJECTION, SOLUTION INTRAVENOUS ONCE AS NEEDED
Status: DISCONTINUED | OUTPATIENT
Start: 2024-07-29 | End: 2024-07-29 | Stop reason: HOSPADM

## 2024-07-29 RX ORDER — LIDOCAINE HYDROCHLORIDE 10 MG/ML
INJECTION, SOLUTION EPIDURAL; INFILTRATION; INTRACAUDAL; PERINEURAL
Status: DISCONTINUED | OUTPATIENT
Start: 2024-07-29 | End: 2024-07-29

## 2024-07-29 RX ORDER — HYDROCODONE BITARTRATE AND ACETAMINOPHEN 7.5; 325 MG/1; MG/1
1 TABLET ORAL EVERY 6 HOURS PRN
Status: DISCONTINUED | OUTPATIENT
Start: 2024-07-29 | End: 2024-07-29 | Stop reason: HOSPADM

## 2024-07-29 RX ORDER — CELECOXIB 200 MG/1
200 CAPSULE ORAL
Status: COMPLETED | OUTPATIENT
Start: 2024-07-29 | End: 2024-07-29

## 2024-07-29 RX ORDER — MORPHINE SULFATE 4 MG/ML
2 INJECTION, SOLUTION INTRAMUSCULAR; INTRAVENOUS
Status: DISCONTINUED | OUTPATIENT
Start: 2024-07-29 | End: 2024-07-29 | Stop reason: HOSPADM

## 2024-07-29 RX ORDER — BUPIVACAINE HYDROCHLORIDE AND EPINEPHRINE 2.5; 5 MG/ML; UG/ML
INJECTION, SOLUTION EPIDURAL; INFILTRATION; INTRACAUDAL; PERINEURAL
Status: DISCONTINUED
Start: 2024-07-29 | End: 2024-07-29 | Stop reason: HOSPADM

## 2024-07-29 RX ORDER — METHOCARBAMOL 100 MG/ML
1000 INJECTION, SOLUTION INTRAMUSCULAR; INTRAVENOUS ONCE
Status: DISCONTINUED | OUTPATIENT
Start: 2024-07-29 | End: 2024-07-29 | Stop reason: HOSPADM

## 2024-07-29 RX ORDER — MIDAZOLAM HYDROCHLORIDE 2 MG/2ML
2 INJECTION, SOLUTION INTRAMUSCULAR; INTRAVENOUS
Status: DISCONTINUED | OUTPATIENT
Start: 2024-07-29 | End: 2024-07-29 | Stop reason: HOSPADM

## 2024-07-29 RX ORDER — GLUCAGON 1 MG
1 KIT INJECTION
Status: DISCONTINUED | OUTPATIENT
Start: 2024-07-29 | End: 2024-07-29 | Stop reason: HOSPADM

## 2024-07-29 RX ORDER — BACITRACIN 500 [USP'U]/G
OINTMENT TOPICAL
Status: DISCONTINUED
Start: 2024-07-29 | End: 2024-07-29 | Stop reason: HOSPADM

## 2024-07-29 RX ORDER — LEVOFLOXACIN 5 MG/ML
500 INJECTION, SOLUTION INTRAVENOUS
Status: DISCONTINUED | OUTPATIENT
Start: 2024-07-29 | End: 2024-07-29 | Stop reason: HOSPADM

## 2024-07-29 RX ORDER — TRAMADOL HYDROCHLORIDE 50 MG/1
50 TABLET ORAL EVERY 4 HOURS PRN
Status: DISCONTINUED | OUTPATIENT
Start: 2024-07-29 | End: 2024-07-29 | Stop reason: HOSPADM

## 2024-07-29 RX ORDER — SODIUM CHLORIDE, SODIUM LACTATE, POTASSIUM CHLORIDE, CALCIUM CHLORIDE 600; 310; 30; 20 MG/100ML; MG/100ML; MG/100ML; MG/100ML
INJECTION, SOLUTION INTRAVENOUS CONTINUOUS
Status: DISCONTINUED | OUTPATIENT
Start: 2024-07-29 | End: 2024-07-29 | Stop reason: HOSPADM

## 2024-07-29 RX ORDER — TRAMADOL HYDROCHLORIDE 50 MG/1
50 TABLET ORAL EVERY 6 HOURS PRN
Qty: 12 TABLET | Refills: 0 | Status: SHIPPED | OUTPATIENT
Start: 2024-07-29

## 2024-07-29 RX ORDER — ROCURONIUM BROMIDE 10 MG/ML
INJECTION, SOLUTION INTRAVENOUS
Status: DISCONTINUED | OUTPATIENT
Start: 2024-07-29 | End: 2024-07-29

## 2024-07-29 RX ORDER — LIDOCAINE HYDROCHLORIDE 10 MG/ML
1 INJECTION, SOLUTION EPIDURAL; INFILTRATION; INTRACAUDAL; PERINEURAL ONCE
Status: DISCONTINUED | OUTPATIENT
Start: 2024-07-29 | End: 2024-07-29 | Stop reason: HOSPADM

## 2024-07-29 RX ORDER — ONDANSETRON HYDROCHLORIDE 2 MG/ML
INJECTION, SOLUTION INTRAMUSCULAR; INTRAVENOUS
Status: DISCONTINUED | OUTPATIENT
Start: 2024-07-29 | End: 2024-07-29

## 2024-07-29 RX ORDER — ACETAMINOPHEN 500 MG
1000 TABLET ORAL
Status: COMPLETED | OUTPATIENT
Start: 2024-07-29 | End: 2024-07-29

## 2024-07-29 RX ORDER — ONDANSETRON HYDROCHLORIDE 2 MG/ML
4 INJECTION, SOLUTION INTRAVENOUS EVERY 6 HOURS PRN
Status: DISCONTINUED | OUTPATIENT
Start: 2024-07-29 | End: 2024-07-29 | Stop reason: HOSPADM

## 2024-07-29 RX ORDER — METHOCARBAMOL 100 MG/ML
1000 INJECTION, SOLUTION INTRAMUSCULAR; INTRAVENOUS ONCE AS NEEDED
Status: COMPLETED | OUTPATIENT
Start: 2024-07-29 | End: 2024-07-29

## 2024-07-29 RX ORDER — PROPOFOL 10 MG/ML
VIAL (ML) INTRAVENOUS
Status: DISCONTINUED | OUTPATIENT
Start: 2024-07-29 | End: 2024-07-29

## 2024-07-29 RX ORDER — PROCHLORPERAZINE EDISYLATE 5 MG/ML
5 INJECTION INTRAMUSCULAR; INTRAVENOUS EVERY 30 MIN PRN
Status: DISCONTINUED | OUTPATIENT
Start: 2024-07-29 | End: 2024-07-29 | Stop reason: HOSPADM

## 2024-07-29 RX ORDER — PROCHLORPERAZINE EDISYLATE 5 MG/ML
5 INJECTION INTRAMUSCULAR; INTRAVENOUS EVERY 6 HOURS PRN
Status: DISCONTINUED | OUTPATIENT
Start: 2024-07-29 | End: 2024-07-29 | Stop reason: HOSPADM

## 2024-07-29 RX ORDER — BUPIVACAINE HYDROCHLORIDE AND EPINEPHRINE 2.5; 5 MG/ML; UG/ML
INJECTION, SOLUTION EPIDURAL; INFILTRATION; INTRACAUDAL; PERINEURAL
Status: DISCONTINUED | OUTPATIENT
Start: 2024-07-29 | End: 2024-07-29 | Stop reason: HOSPADM

## 2024-07-29 RX ADMIN — HYDRALAZINE HYDROCHLORIDE 10 MG: 20 INJECTION INTRAMUSCULAR; INTRAVENOUS at 04:07

## 2024-07-29 RX ADMIN — SODIUM CHLORIDE, POTASSIUM CHLORIDE, SODIUM LACTATE AND CALCIUM CHLORIDE: 600; 310; 30; 20 INJECTION, SOLUTION INTRAVENOUS at 02:07

## 2024-07-29 RX ADMIN — ONDANSETRON 4 MG: 2 INJECTION INTRAMUSCULAR; INTRAVENOUS at 03:07

## 2024-07-29 RX ADMIN — GABAPENTIN 300 MG: 300 CAPSULE ORAL at 12:07

## 2024-07-29 RX ADMIN — ONDANSETRON 4 MG: 4 TABLET, ORALLY DISINTEGRATING ORAL at 12:07

## 2024-07-29 RX ADMIN — PROPOFOL 110 MG: 10 INJECTION, EMULSION INTRAVENOUS at 02:07

## 2024-07-29 RX ADMIN — METHOCARBAMOL 500 MG: 100 INJECTION INTRAMUSCULAR; INTRAVENOUS at 04:07

## 2024-07-29 RX ADMIN — LIDOCAINE HYDROCHLORIDE 50 MG: 10 INJECTION, SOLUTION EPIDURAL; INFILTRATION; INTRACAUDAL; PERINEURAL at 02:07

## 2024-07-29 RX ADMIN — ROCURONIUM BROMIDE 40 MG: 10 INJECTION, SOLUTION INTRAVENOUS at 02:07

## 2024-07-29 RX ADMIN — SUGAMMADEX 60 MG: 100 INJECTION, SOLUTION INTRAVENOUS at 03:07

## 2024-07-29 RX ADMIN — ACETAMINOPHEN 1000 MG: 500 TABLET ORAL at 12:07

## 2024-07-29 RX ADMIN — PHENYLEPHRINE HYDROCHLORIDE 100 MCG: 10 INJECTION INTRAVENOUS at 03:07

## 2024-07-29 RX ADMIN — MIDAZOLAM HYDROCHLORIDE 1 MG: 1 INJECTION, SOLUTION INTRAMUSCULAR; INTRAVENOUS at 02:07

## 2024-07-29 RX ADMIN — LEVOFLOXACIN 500 MG: 500 INJECTION, SOLUTION INTRAVENOUS at 02:07

## 2024-07-29 RX ADMIN — SUGAMMADEX 140 MG: 100 INJECTION, SOLUTION INTRAVENOUS at 03:07

## 2024-07-29 RX ADMIN — CELECOXIB 200 MG: 200 CAPSULE ORAL at 12:07

## 2024-07-29 NOTE — ANESTHESIA PREPROCEDURE EVALUATION
07/29/2024  Tashi Deluna is a 64 y.o., male, who presents for the following:    Procedure: EVACUATION, HEMATOMA, INGUINAL REGION  //right  / Evacuation of right groin/scrotal hematoma/seroma (Right) - Evacuation of right groin/scrotal hematoma/seroma   Anesthesia type: General   Diagnosis: Postoperative seroma involving genitourinary system after genitourinary procedure [N99.842]   Pre-op diagnosis: Postoperative seroma involving genitourinary system after genitourinary procedure [N99.842]   Location: [facility] OR 02 / [facility] OR   Surgeons: eJan Tierney MD       PMH:  Atrial fibrillation CHF (congestive heart failure)   SOB (shortness of breath) Fatigue   Type 1 diabetes mellitus GERD (gastroesophageal reflux disease)   Inguinal hernia, bilateral Dysphagia   History of acute pancreatitis Incisional hernia of anterior abdominal wall without obstruction or gangrene   Weakness Neuropathy, diabetic   History of anxiety        TTE (5/2022) :  The left ventricle is normal in size with mild concentric hypertrophy and mildly decreased systolic function.  The estimated ejection fraction is 48%.  Grade II left ventricular diastolic dysfunction.  Mild right ventricular enlargement with low normal right ventricular systolic function.  Mild mitral regurgitation.  Mild tricuspid regurgitation.     Echo density protruding from septum in LV. It does not resemble a thrombus.         Cardiac HX :    LAB:        PRE_OP CBG : 128    Eliquis stopped 3 days ago    Pre-op Assessment          Review of Systems  Social:  Former Smoker       Cardiovascular:            CHF        PAF, w/ RVR, subsequent Ablation Procedure, now Bi-Vent Pacer  CHF , EF 48%                         Pulmonary:      Shortness of breath                  Hepatic/GI:     GERD             Endocrine:  Diabetes, type 1               Physical Exam  General: Alert  and Oriented    Airway:  Mallampati: III   Mouth Opening: Normal  TM Distance: Normal  Tongue: Normal  Neck ROM: Normal ROM    Dental:  Intact    Chest/Lungs:  Normal Respiratory Rate    Heart:  Rate: Normal  Rhythm: Regular Rhythm        Anesthesia Plan  Type of Anesthesia, risks & benefits discussed:    Anesthesia Type: Gen ETT  Intra-op Monitoring Plan: Standard ASA Monitors  Post Op Pain Control Plan: IV/PO Opioids PRN and multimodal analgesia  Induction:  IV  Airway Plan: Direct, Post-Induction  Informed Consent: Informed consent signed with the Patient and all parties understand the risks and agree with anesthesia plan.  All questions answered. Patient consented to blood products? Yes  ASA Score: 3  Day of Surgery Review of History & Physical: H&P Update referred to the surgeon/provider.    Ready For Surgery From Anesthesia Perspective.     .

## 2024-07-29 NOTE — PLAN OF CARE
Pt. Resting comfortably, VSS, no s/s distress, Davis at acceptable level for transfer to phase II, Criteria met for anesthesia release per Dr. Dexter Douglass.

## 2024-07-29 NOTE — ANESTHESIA POSTPROCEDURE EVALUATION
Anesthesia Post Evaluation    Patient: Tashi Deluna    Procedure(s) Performed: Procedure(s) (LRB):  EVACUATION, HEMATOMA, INGUINAL REGION  //right  / Evacuation of right groin/scrotal hematoma/seroma (Right)    Final Anesthesia Type: general      Patient location during evaluation: PACU  Patient participation: Yes- Able to Participate  Level of consciousness: oriented, awake and sedated  Post-procedure vital signs: reviewed and stable  Pain management: adequate  Airway patency: patent    PONV status at discharge: No PONV  Anesthetic complications: no      Cardiovascular status: hemodynamically stable  Respiratory status: spontaneous ventilation and unassisted  Hydration status: euvolemic  Follow-up not needed.  Comments: [facility]              Vitals Value Taken Time   /95 07/29/24 1532   Temp 36.6 °C (97.9 °F) 07/29/24 1520   Pulse 85 07/29/24 1537   Resp 12 07/29/24 1537   SpO2 100 % 07/29/24 1537   Vitals shown include unfiled device data.      No case tracking events are documented in the log.      Pain/Davis Score: Pain Rating Prior to Med Admin: 0 (7/29/2024 12:47 PM)

## 2024-07-29 NOTE — DISCHARGE INSTRUCTIONS
Dr. Tierney's Post Operative  Discharge Instructions    This sheet gives you information about how to care for yourself after your procedure. Your health care provider may also give you more specific instructions. If you have problems or questions, contact your health care provider.  What can I expect after the procedure?  After the procedure, it is common to have:   Pain.   Swelling and bruising around the incision area.   Scrotal swelling, in men.   Some fluid or blood draining from your incisions.  Follow these instructions at home:  Incision care   Follow instructions from your health care provider about how to take care of your incisions. Make sure you:  ? Wash your hands with soap and water before you change your bandage (dressing). If soap and water are not available, use hand .  ? Keep surgical gauze dressing on for 48 hours, then ok to remove gauze dressings and shower.   Ok to shower. Wash incisions with antibacterial soap and water for 2 weeks post op. Pat dry. Avoid tub baths/swimming for 2 weeks post op to ensure incisions have completely closed.   ? Leave stitches (sutures), skin glue, or adhesive strips in place. If adhesive strip edges start to loosen and curl up, you may trim the loose edges. Do not remove adhesive strips for 7 days post op. If strips still present 1 week after surgery, ok to remove strips in the shower.    Check your incision area every day for signs of infection. Check for:  ? More redness, swelling, or pain.  ? More fluid or blood.  ? Warmth.  ? Pus or a bad smell.   Wear loose, soft clothing while your incisions heal.   Wear scrotal support (jock strap) provided to you by the hospital staff to prevent post op swelling. If scrotal support not available, please wear tight fitted briefs to limit scrotal swelling.   Scrotal support helps to limit swelling in the groin and scrotum in the initial post op period. Wear scrotal support for first 2 weeks post op  if possible.   Driving   Do not drive or use heavy machinery while taking prescription pain medicine.     Do not drive for 3 days after surgery or as long as you are taking prescription pain medicine.    Activity   Do not lift anything that is heavier than 10 lb (4.5 kg) for 4 weeks post op.    Ask your health care provider what activities are safe for you. A lot of activity during the first week after surgery can increase pain and swelling. For 1 week after your procedure:  ? Avoid activities that take a lot of effort, such as exercise or sports.  ? You may walk and climb stairs as needed for daily activity, but avoid long walks or climbing stairs for exercise.  Managing pain and swelling     Put ice on painful or swollen areas:  ? Put ice in a plastic bag.  ? Place a towel between your skin and the bag.  ? Leave the ice on for 20 minutes, 2-3 times a day.  General instructions   Take over-the-counter and prescription medicines only as told by your health care provider.   To prevent or treat constipation while you are taking prescription pain medicine, your health care provider may recommend that you:   ? Drink enough fluid to keep your urine pale yellow.  ? Take over-the-counter or prescription medicines.   ? Eat foods that are high in fiber, such as fresh fruits and vegetables, whole grains, and beans.   ? Limit foods that are high in fat and processed sugars, such as fried and sweet foods.   Do not use any products that contain nicotine or tobacco, such as cigarettes and e-cigarettes. If you need help quitting, ask your health care provider.   Drink enough fluid to keep your urine pale yellow.   Keep all follow-up visits as told by your health care provider. This is important.  Contact a health care provider if:   You have more redness, swelling, or pain around your incisions or your groin area.   You have more swelling in your scrotum.   You have more fluid or blood coming from your incisions.   Your incisions  feel warm to the touch.   You have severe pain and medicines do not help.   You have abdominal pain or swelling.   You cannot eat or drink without vomiting.   You cannot urinate or pass a bowel movement.   You faint.   You feel dizzy.   You have nausea and vomiting.   You have a fever.  Get help right away if:   You have pus or a bad smell coming from your incisions.   You have redness, warmth, or pain in your leg.   You have chest pain.   You have problems breathing.  Summary   Pain, swelling, and bruising are common after the procedure.   Check your incision area every day for signs of infection, such as more redness, swelling, or pain.   Put ice on painful or swollen areas for 20 minutes, 2-3 times a day.  This information is not intended to replace advice given to you by your health care provider. Make sure you discuss any questions you have with your health care provider.    How to Prevent Constipation After Surgery  Constipation is a common problem after surgery. Many things can make constipation more likely after a surgery, including:   Certain medicines, especially numbing medicines (anesthetics) and very strong pain medicines called opioids.   Feeling stressed because of the surgery.   Eating different foods than normal.   Being less active.  Symptoms of constipation include:   Having fewer than three bowel movements a week.   Straining to have a bowel movement.   Having hard, dry, or larger-than-normal stools (feces).   Discomfort in the lower abdomen, such as cramps or bloating.   Not feeling relief after having a bowel movement.   Nausea and vomiting.  You can take steps to help prevent constipation after surgery.  Follow these instructions at home:  Eating and drinking     Eat foods that have a lot of fiber in them, such as beans, bran, whole grains, and fresh fruits and vegetables.   Limit foods that are high in fat and processed sugars, such as fried or sweet foods. These include french fries,  hamburgers, cookies, and candy.   Take a fiber supplement as told by your health care provider. If you are not taking a fiber supplement and you think you are not getting enough fiber from foods, talk to your health care provider about adding a fiber supplement to your diet.   Drink enough fluid to keep your urine pale yellow.   Drink clear fluids, especially water. Avoid drinking alcohol, caffeine, and soda. These can make constipation worse.  Activity     After surgery, return to your normal activities slowly, or when your health care provider says it is okay.   Start walking as soon as you can. Try to go a little farther each day.   Once your health care provider approves, do some sort of regular exercise. This helps prevent constipation.  Bowel movements   Go to the restroom when you have the urge to go. Do not hold it in.   Try drinking something hot to get a bowel movement started.   Keep track of how often you use the restroom.  Medicines   Take over-the-counter and prescription medicines only as told by your health care provider.   Talk to your health care provider about medicines that may help prevent constipation, particularly if you have a history of constipation. Your health care provider may suggest a stool softener, laxative, or fiber supplement.   Do not take any medicines without talking to your health care provider first.  Contact a health care provider if:   You used stool softeners or laxatives and still have not had a bowel movement within 24-48 hours after using them.   You have not had a bowel movement in 3 days.   You have a fever.  Get help right away if you have:   Constipation that lasts for more than 4 days or if your symptoms get worse.   Bright red blood in your stool.   Pain in the abdomen or rectum.   Very bad cramping.   Thin, pencil-like stools.   Unexplained weight loss.  Summary   Constipation is a common problem after surgery. Many things can make constipation more likely after a  surgery, including certain medicines, eating different foods than normal, and being less active.   Symptoms of constipation include having fewer than three bowel movements a week, straining to have a bowel movement, and cramps or bloating in the lower abdomen.   To help prevent constipation, you should eat foods that are high in fiber, drink plenty of fluids, and get regular physical activity.   Your health care provider may suggest medicines, such as stool softeners or laxatives, to help prevent constipation.  This information is not intended to replace advice given to you by your health care provider. Make sure you discuss any questions you have with your health care provider.  Document Revised: 11/04/2020 Document Reviewed: 11/04/2020  Elsevier Patient Education © 2021 [employer]

## 2024-07-29 NOTE — BRIEF OP NOTE
[facility] Surgical - Periop Services  Brief Operative Note    Surgery Date: 7/29/2024     Surgeons and Role:     * Jean Tierney MD - Primary    Assisting Surgeon: MARSHA Ramos      Pre-op Diagnosis:  Postoperative seroma involving genitourinary system after genitourinary procedure [N99.842]    Post-op Diagnosis:  Post-Op Diagnosis Codes:     * Postoperative seroma involving genitourinary system after genitourinary procedure [N99.842]    Procedure(s) (LRB):  EVACUATION, HEMATOMA, INGUINAL REGION  //right  / Evacuation of right groin/scrotal hematoma/seroma (Right)    Anesthesia: General    Operative Findings: dictated per surgeon     Estimated Blood Loss: 10 cc         Specimens:   Specimen (24h ago, onward)      None              Discharge Note    OUTCOME: Patient tolerated treatment/procedure well without complication and is now ready for discharge.    DISPOSITION: Home or Self Care    FINAL DIAGNOSIS:  Postoperative hematoma involving genitourinary system following genitourinary procedure    FOLLOWUP: In clinic    DISCHARGE INSTRUCTIONS:  DC instructions given to pt     Clinical Reference Documents Added to Patient Instructions         Document    WOUND INCISION AND DRAINAGE DISCHARGE INSTRUCTIONS (ENGLISH)

## 2024-07-29 NOTE — TRANSFER OF CARE
"Anesthesia Transfer of Care Note    Patient: Tashi Deluna    Procedure(s) Performed: Procedure(s) (LRB):  EVACUATION, HEMATOMA, INGUINAL REGION  //right  / Evacuation of right groin/scrotal hematoma/seroma (Right)    Patient location: PACU    Anesthesia Type: general    Transport from OR: Transported from OR on room air with adequate spontaneous ventilation    Post pain: adequate analgesia    Post assessment: no apparent anesthetic complications    Post vital signs: stable    Level of consciousness: sedated    Nausea/Vomiting: no nausea/vomiting    Complications: none    Transfer of care protocol was followed      Last vitals: Visit Vitals  BP (!) 151/88   Pulse 88   Temp 36.6 °C (97.8 °F) (Oral)   Ht 5' 9" (1.753 m)   Wt 67.9 kg (149 lb 11.1 oz)   SpO2 97%   BMI 22.11 kg/m²     "

## 2024-07-29 NOTE — ANESTHESIA PROCEDURE NOTES
Intubation    Date/Time: 7/29/2024 2:40 PM    Performed by: Nanda Spangler CRNA  Authorized by: Dexter Douglass MD    Intubation:     Induction:  Intravenous    Intubated:  Postinduction    Mask Ventilation:  Easy mask    Attempts:  1    Attempted By:  CRNA    Method of Intubation:  Direct    Blade:  Mendez 2    Laryngeal View Grade: Grade IIA - cords partially seen      Difficult Airway Encountered?: No      Complications:  None    Airway Device:  Oral endotracheal tube    Airway Device Size:  7.5    Style/Cuff Inflation:  Cuffed (inflated to minimal occlusive pressure)    Inflation Amount (mL):  4    Tube secured:  23    Secured at:  The lips    Placement Verified By:  Capnometry    Complicating Factors:  None    Findings Post-Intubation:  BS equal bilateral  Notes:      ETT cuff pressure [id number]

## 2024-07-30 VITALS
TEMPERATURE: 98 F | DIASTOLIC BLOOD PRESSURE: 82 MMHG | OXYGEN SATURATION: 98 % | SYSTOLIC BLOOD PRESSURE: 135 MMHG | RESPIRATION RATE: 18 BRPM | HEART RATE: 84 BPM | HEIGHT: 69 IN | BODY MASS INDEX: 22.17 KG/M2 | WEIGHT: 149.69 LBS

## 2024-07-30 LAB — POCT GLUCOSE: 128 MG/DL (ref 70–110)

## 2024-08-12 NOTE — PROGRESS NOTES
Patient ID: [id number]     Chief Complaint: Post-op Evaluation (Evac of right inguinal hematoma 7/29/24)    HPI:     Tashi Deluna is a 64 y.o. male here today for postoperative visit of evacuation of R scrotal hematoma on 7/29/24 after lap right inguinal hernia repair on 2/26/24. Pt's incisions are healing well, some tenderness with walking and when he moves around, occasionally has sharp stabbing pain at times, no fever. + R sided scrotal swelling, mild redness, no skin changes. having normal bowel movements, passing flatus, no NVD. no constipation.     Patient Care Team:  Guevara Torres MD as PCP - General (Family Medicine)  Jean Tierney MD as Surgeon (General Surgery)  Kin Hubbard MD as Consulting Physician (Cardiology)  Jus Douglass MD (Endocrinology)  Kyler Mckeon MD (Gastroenterology)   Past Medical History:   Diagnosis Date    Atrial fibrillation     CHF (congestive heart failure)     Dysphagia     Fatigue     GERD (gastroesophageal reflux disease)     History of acute pancreatitis     History of anxiety     Incisional hernia of anterior abdominal wall without obstruction or gangrene     Inguinal hernia, bilateral     Neuropathy, diabetic     SOB (shortness of breath)     Type 1 diabetes mellitus     Weakness       Past Surgical History:   Procedure Laterality Date    ABLATION N/A 07/05/2022    Procedure: ABLATION;  Surgeon: Kin Hubbard MD;  Location: [facility] CATH LAB;  Service: Cardiology;  Laterality: N/A;  AV NODE ABLATION W/ ANEST.    CARDIAC ELECTROPHYSIOLOGY STUDY AND ABLATION      CARDIOVERSION      COLONOSCOPY  2021    EVACUATION, HEMATOMA, INGUINAL REGION Right 7/29/2024    Procedure: EVACUATION, HEMATOMA, INGUINAL REGION  //right  / Evacuation of right groin/scrotal hematoma/seroma;  Surgeon: Jean Tierney MD;  Location: [facility] OR;  Service: General;  Laterality: Right;  Evacuation of right groin/scrotal hematoma/seroma    Gastric Ulcer Sx  2009    Dr. tierney     HERNIA REPAIR Right     Open (6 years old)    INSERTION OF PACEMAKER  2022    REPAIR, HERNIA, INCISIONAL  2024    Procedure: REPAIR, HERNIA, INCISIONAL;  Surgeon: Jean Tierney MD;  Location: [facility] OR;  Service: General;;  Open abdominal incisional hernia repair with mesh.    REPAIR, HERNIA, INGUINAL Right 2024    Procedure: REPAIR, HERNIA, INGUINAL;  Surgeon: Jean Tierney MD;  Location: [facility] OR;  Service: General;  Laterality: Right;  Open right inguinal hernia repair with mesh.      Social History     Tobacco Use    Smoking status: Former     Current packs/day: 0.00     Types: Cigarettes     Quit date:      Years since quittin.6    Smokeless tobacco: Never   Substance and Sexual Activity    Alcohol use: Never    Drug use: Never    Sexual activity: Not on file      Current Outpatient Medications   Medication Instructions    amiodarone (PACERONE) 200 mg, Oral, 2 times daily, Amiodarone 200mg tablets 400mg twice daily x 3 days then 200mg twice daily x 3 days then 200mg daily    cyanocobalamin, vitamin B-12, (VITAMIN B-12 ORAL) Oral    diphenhydrAMINE (BENADRYL ALLERGY) 25 mg, Oral, Daily PRN    ELIQUIS 5 mg, Oral, 2 times daily    ENTRESTO 24-26 mg per tablet 1 tablet, Oral, 2 times daily    ergocalciferol, vitamin D2, (VITAMIN D ORAL) Oral    famotidine (PEPCID) 10 mg, Oral, 2 times daily    insulin regular 100 unit/mL Inj injection Subcutaneous, 3 times daily before meals, Per sliding scale     LANTUS U-100 INSULIN 10 Units, Subcutaneous, Daily, Per sliding scale    magnesium oxide 500 mg Tab 1 tablet, Oral, Daily    metoprolol succinate (TOPROL-XL) 100 mg, Oral, Daily    pantoprazole (PROTONIX) 40 mg, Oral, Daily    traMADoL (ULTRAM) 50 mg, Oral, Every 6 hours PRN    vitamin D (VITAMIN D3) 1,000 Units, Oral, Daily     Review of patient's allergies indicates:   Allergen Reactions    Amoxicillin Rash        Subjective:     Review of Systems    12 point review of systems conducted,  "negative except as stated in the history of present illness. See HPI for details.    Objective:     Visit Vitals  /83   Pulse 92   Ht 5' 9" (1.753 m)   Wt 67 kg (147 lb 11.3 oz)   BMI 21.81 kg/m²       Physical Exam:  General:  Alert and oriented.     Gastrointestinal:  Soft, Non-tender, Non-distended, Normal bowel sounds. +swelling to right testicle, no over lying skin changes, mild redness, some warmth, mild tenderness to palpation.   Musculoskeletal:  Normal range of motion, Normal strength.    Integumentary:  Warm, Dry, Pink.    Neurologic:  Alert, Oriented.    Psychiatric:  Cooperative.      Assessment:       ICD-10-CM ICD-9-CM   1. Postoperative visit  Z48.89 V58.49   2. S/P evacuation of hematoma  Z98.890 V45.89        Plan:     1. Postoperative visit    2. S/P evacuation of hematoma       - f/u with Dr. Tierney in 8 weeks to eval hematoma. Please call office if you notice any increase in pain in the area, increase in redness, warmth, fever that could be a sign that it is infected and needs to be evacuated again.   - ok to apply ice and heat to the area.   - Incisions healing well  - ok to get in tub, swim, wash with any soap, and still no lifting >15# x's 4 weeks.  - ED precautions given to patient and is to call the office immediately or go to the emergency room if he notices any redness, swelling, severe pain, increase nausea/vomiting, fever, irregular bowel movements or severe diarrhea    Future Appointments   Date Time Provider Department Center   8/13/2024  2:15 PM Sharlene Gregg FNP [facility]   10/15/2024  1:00 PM Jean Tierney MD [facility]     "

## 2024-08-13 ENCOUNTER — OFFICE VISIT (OUTPATIENT)
Dept: SURGERY | Facility: CLINIC | Age: 64
End: 2024-08-13
Payer: MEDICARE

## 2024-08-13 VITALS
SYSTOLIC BLOOD PRESSURE: 120 MMHG | HEART RATE: 92 BPM | HEIGHT: 69 IN | BODY MASS INDEX: 21.87 KG/M2 | DIASTOLIC BLOOD PRESSURE: 83 MMHG | WEIGHT: 147.69 LBS

## 2024-08-13 DIAGNOSIS — Z98.890 S/P EVACUATION OF HEMATOMA: ICD-10-CM

## 2024-08-13 DIAGNOSIS — Z48.89 POSTOPERATIVE VISIT: Primary | ICD-10-CM

## 2024-08-13 PROCEDURE — 99024 POSTOP FOLLOW-UP VISIT: CPT | Mod: POP,,, | Performed by: NURSE PRACTITIONER

## 2024-08-19 NOTE — OP NOTE
[facility] Surgical - Periop Services  Operative Note      Date of Procedure: 7/29/2024     Procedure: Procedure(s) (LRB):  EVACUATION, HEMATOMA, INGUINAL REGION  //right  / Evacuation of right groin/scrotal hematoma/seroma (Right)     Surgeons and Role:     * Jean Tierney MD - Primary    Assisting Surgeon: None    Pre-Operative Diagnosis: Postoperative seroma involving genitourinary system after genitourinary procedure [N99.842]    Post-Operative Diagnosis: Post-Op Diagnosis Codes:     * Postoperative seroma involving genitourinary system after genitourinary procedure [N99.842]    Anesthesia: General    Operative Findings (including complications, if any): seroma old hematoma right scrotum    Description of Technical Procedures:  Take to operating placed upon table in the supine position after induction of anesthetic the groins including the scrotum and penis were prepped and draped in usual sterile fashion.  The patient had a large decreasing right-sided scrotal hematoma from previous inguinal hernia surgery.  This was uncomfortable for patient.  There was no evidence of recurrence of hernia.  An incision was performed in the upper area of the scrotum carried down through skin subcutaneous tissue the superficial fascia was identified this was incised a large amount of old seroma hematoma was evacuated.  Wound was hemostatic and the skin was closed with Vicryl.    Significant Surgical Tasks Conducted by the Assistant(s), if Applicable:      Estimated Blood Loss (EBL): * No values recorded between 7/29/2024  2:56 PM and 7/29/2024  3:19 PM *                          Condition: Good    Disposition: PACU - hemodynamically stable.    Attestation: I was present and scrubbed for the entire procedure.    Discharge Note    OUTCOME: Patient tolerated treatment/procedure well without complication and is now ready for discharge.    DISPOSITION: Home or Self Care    FINAL DIAGNOSIS:  Postoperative hematoma  involving genitourinary system following genitourinary procedure    FOLLOWUP: In clinic        DISCHARGE INSTRUCTIONS:  No discharge procedures on file.     Clinical Reference Documents Added to Patient Instructions         Document    WOUND INCISION AND DRAINAGE DISCHARGE INSTRUCTIONS (ENGLISH)

## 2024-10-15 ENCOUNTER — OFFICE VISIT (OUTPATIENT)
Dept: SURGERY | Facility: CLINIC | Age: 64
End: 2024-10-15
Payer: MEDICARE

## 2024-10-15 VITALS
SYSTOLIC BLOOD PRESSURE: 134 MMHG | BODY MASS INDEX: 22.9 KG/M2 | HEART RATE: 90 BPM | HEIGHT: 69 IN | WEIGHT: 154.63 LBS | DIASTOLIC BLOOD PRESSURE: 85 MMHG

## 2024-10-15 DIAGNOSIS — N99.842 POSTOPERATIVE SEROMA INVOLVING GENITOURINARY SYSTEM AFTER GENITOURINARY PROCEDURE: Primary | ICD-10-CM

## 2024-10-15 PROCEDURE — 99024 POSTOP FOLLOW-UP VISIT: CPT | Mod: POP,,, | Performed by: SURGERY

## 2024-10-15 NOTE — PROGRESS NOTES
[facility] Surgical - General Surgery Services  [address] [place] [id number]  Phone: [phone]  Fax: [phone]     Post Operative Progress Note  General Surgery  Dr. Jean Tierney    Patient Name: Tashi Deluna  YOB: 1960  Author: Ana María Rogel, MARSHA    Date: 10/15/2024                   SUBJECTIVE:     Chief Complaint/Reason for Admission:   Chief Complaint   Patient presents with    Post-op Evaluation     evacuation of R scrotal hematoma on 7/29/24 after lap right inguinal hernia repair on 2/26/24        History of Present Illness:  Mr. Tashi Deluna is a 64 y.o. male who is 11 weeks post op surgery.  The patient is currently without any complaints.    11 weeks s/p evacuation of right scrotal post op hematoma, seroma 7/29/2024 per Dr. Tierney.     8 months s/p Diagnostic laparoscopy, Laparoscopic reduction of right inguinal hernia (large, incarcerated), and Laparoscopic right inguinal hernia repair with mesh TEPP 2/26/24 per Dr. Tierney. Incisional hernia was deferred at this time due to complexity of right inguinal hernia repair.     Patient Care Team:  Guevara Torres MD as PCP - General (Family Medicine)  Jean Tierney MD as Surgeon (General Surgery)  Kin Hubbard MD as Consulting Physician (Cardiology)  Jus Douglass MD (Endocrinology)  Kyler Mckeon MD (Gastroenterology)    Review of Systems:  12 point ROS negative except as stated in HPI    PAST HISTORY:     Past Medical History:   Diagnosis Date    Atrial fibrillation     CHF (congestive heart failure)     Dysphagia     Fatigue     GERD (gastroesophageal reflux disease)     History of acute pancreatitis     History of anxiety     Incisional hernia of anterior abdominal wall without obstruction or gangrene     Inguinal hernia, bilateral     Neuropathy, diabetic     SOB (shortness of breath)     Type 1 diabetes mellitus     Weakness      Past Surgical History:   Procedure  Laterality Date    ABLATION N/A 2022    Procedure: ABLATION;  Surgeon: Kin Hubbard MD;  Location: [facility] CATH LAB;  Service: Cardiology;  Laterality: N/A;  AV NODE ABLATION W/ ANEST.    CARDIAC ELECTROPHYSIOLOGY STUDY AND ABLATION      CARDIOVERSION      COLONOSCOPY      EVACUATION, HEMATOMA, INGUINAL REGION Right 2024    Procedure: EVACUATION, HEMATOMA, INGUINAL REGION  //right  / Evacuation of right groin/scrotal hematoma/seroma;  Surgeon: Jean Aguiar MD;  Location: [facility] OR;  Service: General;  Laterality: Right;  Evacuation of right groin/scrotal hematoma/seroma    Gastric Ulcer Sx      Dr. aguiar    HERNIA REPAIR Right     Open (6 years old)    INSERTION OF PACEMAKER  2022              REPAIR, HERNIA, INGUINAL Right 2024    Procedure: REPAIR, HERNIA, INGUINAL;  Surgeon: Jean Aguiar MD;  Location: [facility] OR;  Service: General;  Laterality: Right;  Open right inguinal hernia repair with mesh.     Family History   Problem Relation Name Age of Onset    ALS Mother      Cancer Father       Social History     Socioeconomic History    Marital status: Single   Tobacco Use    Smoking status: Former     Current packs/day: 0.00     Types: Cigarettes     Quit date:      Years since quittin.8    Smokeless tobacco: Never   Substance and Sexual Activity    Alcohol use: Never    Drug use: Never       MEDICATIONS & ALLERGIES:     Current Outpatient Medications on File Prior to Visit   Medication Sig    amiodarone (PACERONE) 400 MG tablet Take 0.5 tablets (200 mg total) by mouth 2 (two) times daily. Amiodarone 200mg tablets 400mg twice daily x 3 days then 200mg twice daily x 3 days then 200mg daily    cyanocobalamin, vitamin B-12, (VITAMIN B-12 ORAL) Take by mouth.    diphenhydrAMINE (BENADRYL ALLERGY) 25 mg tablet Take 25 mg by mouth daily as needed for Allergies.    ELIQUIS 5 mg Tab Take 5 mg by mouth 2 (two) times daily.    ENTRESTO 24-26 mg per tablet Take 1 tablet by mouth 2  "(two) times daily.    ergocalciferol, vitamin D2, (VITAMIN D ORAL) Take by mouth.    famotidine (PEPCID) 10 MG tablet Take 10 mg by mouth 2 (two) times daily.    insulin regular 100 unit/mL Inj injection Inject into the skin 3 (three) times daily before meals. Per sliding scale    LANTUS U-100 INSULIN 100 unit/mL injection Inject 10 Units into the skin Daily. Per sliding scale    magnesium oxide 500 mg Tab Take 1 tablet by mouth once daily.    metoprolol succinate (TOPROL-XL) 100 MG 24 hr tablet Take 100 mg by mouth once daily.    pantoprazole (PROTONIX) 40 MG tablet Take 40 mg by mouth once daily.    traMADoL (ULTRAM) 50 mg tablet Take 1 tablet (50 mg total) by mouth every 6 (six) hours as needed for Pain.    vitamin D (VITAMIN D3) 1000 units Tab Take 1,000 Units by mouth once daily.     Current Facility-Administered Medications on File Prior to Visit   Medication    lactated ringers infusion     Review of patient's allergies indicates:   Allergen Reactions    Amoxicillin Rash       OBJECTIVE:   Visit Vitals  /85   Pulse 90   Ht 5' 9" (1.753 m)   Wt 70.1 kg (154 lb 9.6 oz)   BMI 22.83 kg/m²       Physical Exam:  General:  Well developed, well nourished, no acute distress  GI:  Abdomen soft, non-tender, non-distended, normoactive bowel sounds, no masses  Skin:  Incision Clean/Dry/Intact. No evidence of infection.    VISIT DIAGNOSES:       ICD-10-CM ICD-9-CM   1. Postoperative seroma involving genitourinary system after genitourinary procedure  N99.842 998.13       ASSESSMENT/PLAN:     64 y.o. male 11 weeks post op s/p evacuation of right scrotal post op hematoma/seroma. 8 months s/p Diagnostic laparoscopy, Laparoscopic reduction of right inguinal hernia (large, incarcerated), and Laparoscopic right inguinal hernia repair with mesh TEPP 2/26/24 per Dr. Tierney    -  Patient is progressing well.  -  Wounds healing well without evidence of infection. Small area of hypergranulation to right scrotal incision, no " evidence. Dr. Tierney applied silver nitrate to this area today during office visit to improve healing time.   -  Dr. Tierney examined patient, discussed recommendations, and answered all questions regarding post op course.     RTC 6 weeks    MARSHA Ramos        I, MARSHA Bowen, am scribing for, and in the presence of, Jean Tierney MD, performed the above scribed service and the documentation accurately describes the services I performed. I attest to the accuracy of the note.

## 2024-11-14 ENCOUNTER — OFFICE VISIT (OUTPATIENT)
Dept: SURGERY | Facility: CLINIC | Age: 64
End: 2024-11-14
Payer: MEDICARE

## 2024-11-14 VITALS
HEIGHT: 69 IN | BODY MASS INDEX: 23.18 KG/M2 | SYSTOLIC BLOOD PRESSURE: 126 MMHG | WEIGHT: 156.5 LBS | DIASTOLIC BLOOD PRESSURE: 85 MMHG | HEART RATE: 100 BPM

## 2024-11-14 DIAGNOSIS — K40.90 LEFT INGUINAL HERNIA: ICD-10-CM

## 2024-11-14 DIAGNOSIS — K43.2 INCISIONAL HERNIA OF ANTERIOR ABDOMINAL WALL WITHOUT OBSTRUCTION OR GANGRENE: ICD-10-CM

## 2024-11-14 DIAGNOSIS — Z48.89 ENCOUNTER FOR POSTOPERATIVE CARE: Primary | ICD-10-CM

## 2024-11-14 PROCEDURE — 99213 OFFICE O/P EST LOW 20 MIN: CPT | Mod: ,,, | Performed by: SURGERY

## 2024-11-14 NOTE — PROGRESS NOTES
[facility] Surgical - General Surgery Services  [address] [place] [address]  Phone: [phone]  Fax: [phone]     HISTORY & PHYSICAL  General Surgery  Dr. Jean Tierney      Patient Name: Tashi Deluna  YOB: 1960  Author: Ana María Rogel, ANP     Date: 11/14/2024                   SUBJECTIVE:     Chief Complaint/Reason for Admission:   Chief Complaint   Patient presents with    Follow-up     Evac of Rt scrotal hematoma 7/29/24; Lap RIH Repar 2/26/24        History of Present Illness:  Mr. Tashi Deluna is a 64 y.o. male who is 11 weeks post op surgery.  The patient is currently without any complaints.     3.5 mos s/p evacuation of right scrotal post op hematoma, seroma 7/29/2024 per Dr. Tierney.      8.5 months s/p Diagnostic laparoscopy, Laparoscopic reduction of right inguinal hernia (large, incarcerated), and Laparoscopic right inguinal hernia repair with mesh TEPP 2/26/24 per Dr. Tierney. Incisional hernia was deferred at this time due to complexity of right inguinal hernia repair.      Patient Care Team:  Guevara Torres MD as PCP - General (Family Medicine)  Jean Tierney MD as Surgeon (General Surgery)  Kin Hubbard MD as Consulting Physician (Cardiology)  Jus Douglass MD (Endocrinology)  Kyler Mckeon MD (Gastroenterology)    Review of Systems:  12 point ROS negative except as stated in HPI    PAST HISTORY:     Past Medical History:   Diagnosis Date    Atrial fibrillation     CHF (congestive heart failure)     Dysphagia     Fatigue     GERD (gastroesophageal reflux disease)     History of acute pancreatitis     History of anxiety     Incisional hernia of anterior abdominal wall without obstruction or gangrene     Inguinal hernia, bilateral     Neuropathy, diabetic     SOB (shortness of breath)     Type 1 diabetes mellitus     Weakness      Past Surgical History:   Procedure Laterality Date    ABLATION N/A 07/05/2022     Procedure: ABLATION;  Surgeon: Kin Hubbard MD;  Location: [facility] CATH LAB;  Service: Cardiology;  Laterality: N/A;  AV NODE ABLATION W/ ANEST.    CARDIAC ELECTROPHYSIOLOGY STUDY AND ABLATION      CARDIOVERSION      COLONOSCOPY      EVACUATION, HEMATOMA, INGUINAL REGION Right 2024    Procedure: EVACUATION, HEMATOMA, INGUINAL REGION  //right  / Evacuation of right groin/scrotal hematoma/seroma;  Surgeon: Jean Aguiar MD;  Location: [facility] OR;  Service: General;  Laterality: Right;  Evacuation of right groin/scrotal hematoma/seroma    Gastric Ulcer Sx      Dr. aguiar    HERNIA REPAIR Right     Open (6 years old)    INSERTION OF PACEMAKER  2022    REPAIR, HERNIA, INCISIONAL  2024    Procedure: REPAIR, HERNIA, INCISIONAL;  Surgeon: Jean gAuiar MD;  Location: [facility];  Service: General;;  Open abdominal incisional hernia repair with mesh.    REPAIR, HERNIA, INGUINAL Right 2024    Procedure: REPAIR, HERNIA, INGUINAL;  Surgeon: Jean Aguiar MD;  Location: [facility];  Service: General;  Laterality: Right;  Open right inguinal hernia repair with mesh.     Family History   Problem Relation Name Age of Onset    ALS Mother      Cancer Father       Social History     Socioeconomic History    Marital status: Single   Tobacco Use    Smoking status: Former     Current packs/day: 0.00     Types: Cigarettes     Quit date:      Years since quittin.8    Smokeless tobacco: Never   Substance and Sexual Activity    Alcohol use: Never    Drug use: Never     Social Drivers of Health     Financial Resource Strain: Low Risk  (2024)    Overall Financial Resource Strain (CARDIA)     Difficulty of Paying Living Expenses: Not very hard   Food Insecurity: Food Insecurity Present (2024)    Hunger Vital Sign     Worried About Running Out of Food in the Last Year: Sometimes true     Ran Out of Food in the Last Year: Sometimes true   Physical Activity: Unknown (2024)    Exercise  "Vital Sign     Days of Exercise per Week: 0 days   Stress: No Stress Concern Present (11/13/2024)    [employer] [place] [employer] - Occupational Stress Questionnaire     Feeling of Stress : Only a little       MEDICATIONS & ALLERGIES:     Current Outpatient Medications on File Prior to Visit   Medication Sig    amiodarone (PACERONE) 400 MG tablet Take 0.5 tablets (200 mg total) by mouth 2 (two) times daily. Amiodarone 200mg tablets 400mg twice daily x 3 days then 200mg twice daily x 3 days then 200mg daily    cyanocobalamin, vitamin B-12, (VITAMIN B-12 ORAL) Take by mouth.    diphenhydrAMINE (BENADRYL ALLERGY) 25 mg tablet Take 25 mg by mouth daily as needed for Allergies.    ELIQUIS 5 mg Tab Take 5 mg by mouth 2 (two) times daily.    ENTRESTO 24-26 mg per tablet Take 1 tablet by mouth 2 (two) times daily.    ergocalciferol, vitamin D2, (VITAMIN D ORAL) Take by mouth.    famotidine (PEPCID) 10 MG tablet Take 10 mg by mouth 2 (two) times daily.    insulin regular 100 unit/mL Inj injection Inject into the skin 3 (three) times daily before meals. Per sliding scale    LANTUS U-100 INSULIN 100 unit/mL injection Inject 10 Units into the skin Daily. Per sliding scale    magnesium oxide 500 mg Tab Take 1 tablet by mouth once daily.    metoprolol succinate (TOPROL-XL) 100 MG 24 hr tablet Take 100 mg by mouth once daily.    pantoprazole (PROTONIX) 40 MG tablet Take 40 mg by mouth once daily.    vitamin D (VITAMIN D3) 1000 units Tab Take 1,000 Units by mouth once daily.     Current Facility-Administered Medications on File Prior to Visit   Medication    lactated ringers infusion     Review of patient's allergies indicates:   Allergen Reactions    Amoxicillin Rash       OBJECTIVE:     Vitals:    11/14/24 1255   BP: 126/85   Pulse: 100   Weight: 71 kg (156 lb 8.4 oz)   Height: 5' 9" (1.753 m)     Body mass index is 23.11 kg/m².    Physical Exam:  General:  Well developed, well nourished, no acute distress  CVS:  RRR, " S1 and S2 normal, no murmurs, rubs, gallops  Resp:  Lungs clear to auscultation, no wheezes, rales, rhonchi, cough  GI:  Abdomen soft, non-tender, non-distended, normoactive bowel sounds, no masses. Incisional hernia, reducible.  :  Scrotal incision healing well, good granulation tissue, no infection. Less than 1 cm, reducible LIH.   Neuro:  CNII-XII grossly intact  Psych:  Alert and oriented to person, place, and time    Results:  I have independently reviewed all pertinent lab and radiologic studies relevant to general surgery.      VISIT DIAGNOSES:       ICD-10-CM ICD-9-CM   1. Encounter for postoperative care  Z48.89 V58.49   2. Incisional hernia of anterior abdominal wall without obstruction or gangrene  K43.2 553.21   3. Left inguinal hernia  K40.90 550.90       ASSESSMENT/PLAN:     Dr. Tierney applied silver nitrate. Silver nitrate applied to scrotal incision to promote healing.     FU 3 mos, consider discussing incisional hernia repair at that time    Dr. Tierney examined patient, discussed recommendations, obtained informed consent, and answered all questions with patient/family.     MARSHA Ramos      I, MARSHA Bowen, am scribing for, and in the presence of, Jean Tierney MD, performed the above scribed service and the documentation accurately describes the services I performed. I attest to the accuracy of the note.

## 2024-12-11 ENCOUNTER — HOSPITAL ENCOUNTER (EMERGENCY)
Facility: HOSPITAL | Age: 64
Discharge: HOME | End: 2024-12-11
Attending: STUDENT IN AN ORGANIZED HEALTH CARE EDUCATION/TRAINING PROGRAM
Payer: MEDICARE

## 2024-12-11 ENCOUNTER — ANESTHESIA (OUTPATIENT)
Dept: SURGERY | Facility: HOSPITAL | Age: 64
End: 2024-12-11
Payer: MEDICARE

## 2024-12-11 ENCOUNTER — ANESTHESIA EVENT (OUTPATIENT)
Dept: SURGERY | Facility: HOSPITAL | Age: 64
End: 2024-12-11
Payer: MEDICARE

## 2024-12-11 VITALS
HEART RATE: 82 BPM | DIASTOLIC BLOOD PRESSURE: 90 MMHG | TEMPERATURE: 98 F | OXYGEN SATURATION: 99 % | WEIGHT: 160 LBS | RESPIRATION RATE: 18 BRPM | SYSTOLIC BLOOD PRESSURE: 145 MMHG | HEIGHT: 69 IN | BODY MASS INDEX: 23.7 KG/M2

## 2024-12-11 DIAGNOSIS — T18.128A FOOD IMPACTION OF ESOPHAGUS, INITIAL ENCOUNTER: Primary | ICD-10-CM
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
## 2024-12-11 LAB
ALBUMIN SERPL-MCNC: 3.8 G/DL (ref 3.4–4.8)
ALBUMIN/GLOB SERPL: 1.3 RATIO (ref 1.1–2)
ALP SERPL-CCNC: 67 UNIT/L (ref 40–150)
ALT SERPL-CCNC: 5 UNIT/L (ref 0–55)
ANION GAP SERPL CALC-SCNC: 10 MEQ/L
AST SERPL-CCNC: 13 UNIT/L (ref 5–34)
BASOPHILS # BLD AUTO: 0.03 X10(3)/MCL
BASOPHILS NFR BLD AUTO: 0.6 %
BILIRUB SERPL-MCNC: 4.1 MG/DL
BUN SERPL-MCNC: 6.7 MG/DL (ref 8.4–25.7)
CALCIUM SERPL-MCNC: 8.3 MG/DL (ref 8.8–10)
CHLORIDE SERPL-SCNC: 102 MMOL/L (ref 98–107)
CO2 SERPL-SCNC: 24 MMOL/L (ref 23–31)
CREAT SERPL-MCNC: 0.91 MG/DL (ref 0.72–1.25)
CREAT/UREA NIT SERPL: 7
EOSINOPHIL # BLD AUTO: 0.01 X10(3)/MCL (ref 0–0.9)
EOSINOPHIL NFR BLD AUTO: 0.2 %
ERYTHROCYTE [DISTWIDTH] IN BLOOD BY AUTOMATED COUNT: 13.4 % (ref 11.5–17)
GFR SERPLBLD CREATININE-BSD FMLA CKD-EPI: >60 ML/MIN/1.73/M2
GLOBULIN SER-MCNC: 2.9 GM/DL (ref 2.4–3.5)
GLUCOSE SERPL-MCNC: 362 MG/DL (ref 82–115)
HCT VFR BLD AUTO: 41.5 % (ref 42–52)
HGB BLD-MCNC: 14.3 G/DL (ref 14–18)
IMM GRANULOCYTES # BLD AUTO: 0.02 X10(3)/MCL (ref 0–0.04)
IMM GRANULOCYTES NFR BLD AUTO: 0.4 %
LYMPHOCYTES # BLD AUTO: 1.1 X10(3)/MCL (ref 0.6–4.6)
LYMPHOCYTES NFR BLD AUTO: 21.8 %
MCH RBC QN AUTO: 29 PG (ref 27–31)
MCHC RBC AUTO-ENTMCNC: 34.5 G/DL (ref 33–36)
MCV RBC AUTO: 84.2 FL (ref 80–94)
MONOCYTES # BLD AUTO: 0.28 X10(3)/MCL (ref 0.1–1.3)
MONOCYTES NFR BLD AUTO: 5.5 %
NEUTROPHILS # BLD AUTO: 3.61 X10(3)/MCL (ref 2.1–9.2)
NEUTROPHILS NFR BLD AUTO: 71.5 %
NRBC BLD AUTO-RTO: 0 %
PLATELET # BLD AUTO: 138 X10(3)/MCL (ref 130–400)
PLATELETS.RETICULATED NFR BLD AUTO: 3.6 % (ref 0.9–11.2)
PMV BLD AUTO: 9.9 FL (ref 7.4–10.4)
POCT GLUCOSE: 277 MG/DL (ref 70–110)
POTASSIUM SERPL-SCNC: 4 MMOL/L (ref 3.5–5.1)
PROT SERPL-MCNC: 6.7 GM/DL (ref 5.8–7.6)
RBC # BLD AUTO: 4.93 X10(6)/MCL (ref 4.7–6.1)
SODIUM SERPL-SCNC: 136 MMOL/L (ref 136–145)
WBC # BLD AUTO: 5.05 X10(3)/MCL (ref 4.5–11.5)

## 2024-12-11 PROCEDURE — 80053 COMPREHEN METABOLIC PANEL: CPT | Performed by: PHYSICIAN ASSISTANT

## 2024-12-11 PROCEDURE — 63600175 PHARM REV CODE 636 W HCPCS: Performed by: ANESTHESIOLOGY

## 2024-12-11 PROCEDURE — 96374 THER/PROPH/DIAG INJ IV PUSH: CPT

## 2024-12-11 PROCEDURE — 85025 COMPLETE CBC W/AUTO DIFF WBC: CPT | Performed by: PHYSICIAN ASSISTANT

## 2024-12-11 PROCEDURE — 43247 EGD REMOVE FOREIGN BODY: CPT | Performed by: INTERNAL MEDICINE

## 2024-12-11 PROCEDURE — 99285 EMERGENCY DEPT VISIT HI MDM: CPT | Mod: 25

## 2024-12-11 PROCEDURE — 93005 ELECTROCARDIOGRAM TRACING: CPT

## 2024-12-11 PROCEDURE — 63600175 PHARM REV CODE 636 W HCPCS: Mod: JZ,JG | Performed by: PHYSICIAN ASSISTANT

## 2024-12-11 PROCEDURE — 43200 ESOPHAGOSCOPY FLEXIBLE BRUSH: CPT | Performed by: INTERNAL MEDICINE

## 2024-12-11 PROCEDURE — 27201423 OPTIME MED/SURG SUP & DEVICES STERILE SUPPLY: Performed by: INTERNAL MEDICINE

## 2024-12-11 PROCEDURE — 25000003 PHARM REV CODE 250: Performed by: NURSE ANESTHETIST, CERTIFIED REGISTERED

## 2024-12-11 PROCEDURE — 93010 ELECTROCARDIOGRAM REPORT: CPT | Mod: ,,, | Performed by: INTERNAL MEDICINE

## 2024-12-11 PROCEDURE — 63600175 PHARM REV CODE 636 W HCPCS: Performed by: NURSE ANESTHETIST, CERTIFIED REGISTERED

## 2024-12-11 PROCEDURE — 37000008 HC ANESTHESIA 1ST 15 MINUTES: Performed by: INTERNAL MEDICINE

## 2024-12-11 PROCEDURE — 37000009 HC ANESTHESIA EA ADD 15 MINS: Performed by: INTERNAL MEDICINE

## 2024-12-11 PROCEDURE — 63600175 PHARM REV CODE 636 W HCPCS: Performed by: STUDENT IN AN ORGANIZED HEALTH CARE EDUCATION/TRAINING PROGRAM

## 2024-12-11 PROCEDURE — 96375 TX/PRO/DX INJ NEW DRUG ADDON: CPT

## 2024-12-11 RX ORDER — GLUCAGON 1 MG
1 KIT INJECTION
Status: CANCELLED | OUTPATIENT
Start: 2024-12-11

## 2024-12-11 RX ORDER — FENTANYL CITRATE 50 UG/ML
INJECTION, SOLUTION INTRAMUSCULAR; INTRAVENOUS
Status: DISCONTINUED | OUTPATIENT
Start: 2024-12-11 | End: 2024-12-11

## 2024-12-11 RX ORDER — CALCIUM CHLORIDE INJECTION 100 MG/ML
INJECTION, SOLUTION INTRAVENOUS
Status: DISCONTINUED | OUTPATIENT
Start: 2024-12-11 | End: 2024-12-11

## 2024-12-11 RX ORDER — EPHEDRINE SULFATE 50 MG/ML
INJECTION, SOLUTION INTRAVENOUS
Status: DISCONTINUED | OUTPATIENT
Start: 2024-12-11 | End: 2024-12-11

## 2024-12-11 RX ORDER — ONDANSETRON HYDROCHLORIDE 2 MG/ML
4 INJECTION, SOLUTION INTRAVENOUS
Status: COMPLETED | OUTPATIENT
Start: 2024-12-11 | End: 2024-12-11

## 2024-12-11 RX ORDER — FENTANYL CITRATE 50 UG/ML
25 INJECTION, SOLUTION INTRAMUSCULAR; INTRAVENOUS EVERY 5 MIN PRN
Status: CANCELLED | OUTPATIENT
Start: 2024-12-11

## 2024-12-11 RX ORDER — LIDOCAINE HYDROCHLORIDE 10 MG/ML
1 INJECTION, SOLUTION EPIDURAL; INFILTRATION; INTRACAUDAL; PERINEURAL ONCE
Status: CANCELLED | OUTPATIENT
Start: 2024-12-11 | End: 2024-12-11

## 2024-12-11 RX ORDER — LIDOCAINE HYDROCHLORIDE 20 MG/ML
INJECTION, SOLUTION EPIDURAL; INFILTRATION; INTRACAUDAL; PERINEURAL
Status: DISCONTINUED | OUTPATIENT
Start: 2024-12-11 | End: 2024-12-11

## 2024-12-11 RX ORDER — PROPOFOL 10 MG/ML
VIAL (ML) INTRAVENOUS
Status: DISCONTINUED | OUTPATIENT
Start: 2024-12-11 | End: 2024-12-11

## 2024-12-11 RX ORDER — PHENYLEPHRINE HCL IN 0.9% NACL 1 MG/10 ML
SYRINGE (ML) INTRAVENOUS
Status: DISCONTINUED | OUTPATIENT
Start: 2024-12-11 | End: 2024-12-11

## 2024-12-11 RX ORDER — SODIUM CHLORIDE 0.9 % (FLUSH) 0.9 %
10 SYRINGE (ML) INJECTION
Status: CANCELLED | OUTPATIENT
Start: 2024-12-11

## 2024-12-11 RX ORDER — ONDANSETRON HYDROCHLORIDE 2 MG/ML
INJECTION, SOLUTION INTRAVENOUS
Status: DISCONTINUED | OUTPATIENT
Start: 2024-12-11 | End: 2024-12-11

## 2024-12-11 RX ORDER — ACETAMINOPHEN 10 MG/ML
1000 INJECTION, SOLUTION INTRAVENOUS ONCE
Status: CANCELLED | OUTPATIENT
Start: 2024-12-11 | End: 2024-12-11

## 2024-12-11 RX ORDER — FAMOTIDINE 10 MG/ML
20 INJECTION INTRAVENOUS ONCE
Status: CANCELLED | OUTPATIENT
Start: 2024-12-11

## 2024-12-11 RX ORDER — SUCCINYLCHOLINE CHLORIDE 20 MG/ML
INJECTION INTRAMUSCULAR; INTRAVENOUS
Status: DISCONTINUED | OUTPATIENT
Start: 2024-12-11 | End: 2024-12-11

## 2024-12-11 RX ORDER — GLUCAGON 1 MG
1 KIT INJECTION
Status: COMPLETED | OUTPATIENT
Start: 2024-12-11 | End: 2024-12-11

## 2024-12-11 RX ADMIN — LIDOCAINE HYDROCHLORIDE 4 ML: 20 INJECTION, SOLUTION INTRAVENOUS at 10:12

## 2024-12-11 RX ADMIN — Medication 100 MCG: at 10:12

## 2024-12-11 RX ADMIN — SUCCINYLCHOLINE CHLORIDE 120 MG: 20 INJECTION, SOLUTION INTRAMUSCULAR; INTRAVENOUS at 10:12

## 2024-12-11 RX ADMIN — EPHEDRINE SULFATE 25 MG: 50 INJECTION INTRAVENOUS at 10:12

## 2024-12-11 RX ADMIN — CALCIUM CHLORIDE INJECTION 0.5 G: 100 INJECTION, SOLUTION INTRAVENOUS at 10:12

## 2024-12-11 RX ADMIN — SODIUM CHLORIDE: 9 INJECTION, SOLUTION INTRAVENOUS at 10:12

## 2024-12-11 RX ADMIN — ONDANSETRON 4 MG: 2 INJECTION INTRAMUSCULAR; INTRAVENOUS at 08:12

## 2024-12-11 RX ADMIN — PROPOFOL 80 MG: 10 INJECTION, EMULSION INTRAVENOUS at 10:12

## 2024-12-11 RX ADMIN — FENTANYL CITRATE 100 MCG: 50 INJECTION, SOLUTION INTRAMUSCULAR; INTRAVENOUS at 10:12

## 2024-12-11 RX ADMIN — INSULIN HUMAN 4 UNITS: 100 INJECTION, SOLUTION PARENTERAL at 10:12

## 2024-12-11 RX ADMIN — GLUCAGON 1 MG: 1 INJECTION, POWDER, LYOPHILIZED, FOR SOLUTION INTRAMUSCULAR; INTRAVENOUS at 08:12

## 2024-12-11 RX ADMIN — ONDANSETRON 4 MG: 2 INJECTION INTRAMUSCULAR; INTRAVENOUS at 10:12

## 2024-12-12 LAB
POCT GLUCOSE: 223 MG/DL (ref 70–110)
POCT GLUCOSE: 353 MG/DL (ref 70–110)

## 2024-12-12 NOTE — TRANSFER OF CARE
"Anesthesia Transfer of Care Note    Patient: Tashi Deluna    Procedure(s) Performed: Procedure(s) (LRB):  EGD, WITH FOREIGN BODY REMOVAL (N/A)    Patient location: PACU    Anesthesia Type: general    Transport from OR: Transported from OR on room air with adequate spontaneous ventilation    Post pain: adequate analgesia    Post assessment: no apparent anesthetic complications    Post vital signs: stable    Level of consciousness: awake and alert    Nausea/Vomiting: no nausea/vomiting    Complications: none    Transfer of care protocol was followed      Last vitals: Visit Vitals  BP (!) 142/85 (BP Location: Right arm, Patient Position: Lying)   Pulse 78   Temp 36.8 °C (98.2 °F) (Skin)   Resp 14   Ht 5' 9" (1.753 m)   Wt 72.6 kg (160 lb)   SpO2 99%   BMI 23.63 kg/m²     "

## 2024-12-12 NOTE — ANESTHESIA POSTPROCEDURE EVALUATION
Anesthesia Post Evaluation    Patient: Tashi Deluna    Procedure(s) Performed: Procedure(s) (LRB):  EGD, WITH FOREIGN BODY REMOVAL (N/A)    Final Anesthesia Type: general      Patient location during evaluation: PACU  Patient participation: Yes- Able to Participate  Level of consciousness: awake and alert  Post-procedure vital signs: reviewed and stable  Pain management: adequate  Airway patency: patent    PONV status at discharge: No PONV  Anesthetic complications: no      Cardiovascular status: blood pressure returned to baseline  Respiratory status: unassisted and spontaneous ventilation  Hydration status: euvolemic  Follow-up needed               Vitals Value Taken Time   /90 12/11/24 2354   Temp 36.8 °C (98.2 °F) 12/11/24 2307   Pulse 82 12/11/24 2354   Resp 18 12/11/24 2354   SpO2 99 % 12/11/24 2354         Event Time   Out of Recovery 23:14:00         Pain/Davis Score: Davis Score: 9 (12/11/2024 11:04 PM)

## 2024-12-12 NOTE — FIRST PROVIDER EVALUATION
Medical screening examination initiated.  I have conducted a focused provider triage encounter, findings are as follows:    Brief history of present illness:  64-year-old male presents to ED difficulty swallowing.  Patient reports that he was eating [place] and feels like stuck in throat. States able to drink water but feels stuck.     Vitals:    12/11/24 1919   Temp: 97.7 °F (36.5 °C)   TempSrc: Oral       Pertinent physical exam:  Patient is awake and alert and oriented.  Ambulatory to triage.  In no acute distress.      Brief workup plan:  labs, IV    Preliminary workup initiated; this workup will be continued and followed by the physician or advanced practice provider that is assigned to the patient when roomed.

## 2024-12-12 NOTE — PROVATION PATIENT INSTRUCTIONS
Discharge Summary/Instructions after an Endoscopic Procedure  Patient Name: Tashi Deluna  Patient MRN: [id number]  Patient YOB: 1960  Wednesday, December 11, 2024  Samuel De La Garza MD  Dear patient,  As a result of recent federal legislation (The Federal Cures Act), you may   receive lab or pathology results from your procedure in your [facility]   account before your physician is able to contact you. Your physician or   their representative will relay the results to you with their   recommendations at their soonest availability.  Thank you,  RESTRICTIONS:  During your procedure today, you received medications for sedation.  These   medications may affect your judgment, balance and coordination.  Therefore,   for 24 hours, you have the following restrictions:   - DO NOT drive a car, operate machinery, make legal/financial decisions,   sign important papers or drink alcohol.    ACTIVITY:  Today: no heavy lifting, straining or running due to procedural   sedation/anesthesia.  The following day: return to full activity including work.  DIET:  Eat and drink normally unless instructed otherwise.     TREATMENT FOR COMMON SIDE EFFECTS:  - Mild abdominal pain, nausea, belching, bloating or excessive gas:  rest,   eat lightly and use a heating pad.  - Sore Throat: treat with throat lozenges and/or gargle with warm salt   water.  - Because air was used during the procedure, expelling large amounts of air   from your rectum or belching is normal.  - If a bowel prep was taken, you may not have a bowel movement for 1-3 days.    This is normal.  SYMPTOMS TO WATCH FOR AND REPORT TO YOUR PHYSICIAN:  1. Abdominal pain or bloating, other than gas cramps.  2. Chest pain.  3. Back pain.  4. Signs of infection such as: chills or fever occurring within 24 hours   after the procedure.  5. Rectal bleeding, which would show as bright red, maroon, or black stools.   (A tablespoon of blood from the rectum is not serious, especially  if   hemorrhoids are present.)  6. Vomiting.  7. Weakness or dizziness.  GO DIRECTLY TO THE NEAREST EMERGENCY ROOM IF YOU HAVE ANY OF THE FOLLOWING:      Difficulty breathing              Chills and/or fever over 101 F   Persistent vomiting and/or vomiting blood   Severe abdominal pain   Severe chest pain   Black, tarry stools   Bleeding- more than one tablespoon   Any other symptom or condition that you feel may need urgent attention  Your doctor recommends these additional instructions:  If any biopsies were taken, your doctors clinic will contact you in 1 to 2   weeks with any results.  - Patient has a contact number available for emergencies.  The signs and   symptoms of potential delayed complications were discussed with the   patient.  Return to normal activities tomorrow.  Written discharge   instructions were provided to the patient.   - Clear liquid diet today and then would not advance past full liquid diet   until repeat endoscopy with dilation.   - Discharge patient to home.   - Use Protonix (pantoprazole) 40 mg PO BID for 4 months.   - Repeat upper endoscopy in 2-4 weeks to perform dilation when patient is   able to hold eliquis with primary GI.   - The findings and recommendations were discussed with the patient's   family.  For questions, problems or results please call your physician - Samuel De La Garza MD at Work:  [phone].  [facility] ED at [phone]  IF A COMPLICATION OR EMERGENCY SITUATION ARISES AND YOU ARE UNABLE TO REACH   YOUR PHYSICIAN - GO DIRECTLY TO THE EMERGENCY ROOM.  MD Samuel Chun MD  12/11/2024 11:03:50 PM  This report has been verified and signed electronically.  Dear patient,  As a result of recent federal legislation (The Federal Cures Act), you may   receive lab or pathology results from your procedure in your [facility]   account before your physician is able to contact you. Your physician or   their representative will relay  the results to you with their   recommendations at their soonest availability.  Thank you,  PROVATION

## 2024-12-12 NOTE — ED PROVIDER NOTES
Encounter Date: 12/11/2024    SCRIBE #1 NOTE: I, Patrica Syed, am scribing for, and in the presence of,  Vasquez Pro IV, MD. I have scribed the following portions of the note - the EKG reading. Other sections scribed: HPI, ROS, PE.       History     Chief Complaint   Patient presents with    Dysphagia     Pt presents c/o difficulty swallowing. Pt states was eating boudin and feels as if it is still stuck In his throat. Pt attempt to drink water states food in still in throat and water still wants to come up. Pt states hx food bolus and dilation of esophagus. 99% RA in triage. No SOB in triage pt verbal in triage.     64 year old male with history of Afib, CHF, GERD, DM, esophageal food bolus, and esophageal dilation presents to the ED for food bolus. Pt reports that he was eating Boudin today and it got stuck in his throat. He notes that he tried drinking water but cannot keep it down. He complains of intermittent nausea.     The history is provided by the patient. No  was used.     Review of patient's allergies indicates:   Allergen Reactions    Amoxicillin Rash     Past Medical History:   Diagnosis Date    Atrial fibrillation     CHF (congestive heart failure)     Dysphagia     Fatigue     GERD (gastroesophageal reflux disease)     History of acute pancreatitis     History of anxiety     Incisional hernia of anterior abdominal wall without obstruction or gangrene     Inguinal hernia, bilateral     Neuropathy, diabetic     SOB (shortness of breath)     Type 1 diabetes mellitus     Weakness      Past Surgical History:   Procedure Laterality Date    ABLATION N/A 07/05/2022    Procedure: ABLATION;  Surgeon: Kin Hubbard MD;  Location: [facility] CATH LAB;  Service: Cardiology;  Laterality: N/A;  AV NODE ABLATION W/ ANEST.    CARDIAC ELECTROPHYSIOLOGY STUDY AND ABLATION      CARDIOVERSION      COLONOSCOPY  2021    EVACUATION, HEMATOMA, INGUINAL REGION Right 7/29/2024    Procedure: EVACUATION,  HEMATOMA, INGUINAL REGION  //right  / Evacuation of right groin/scrotal hematoma/seroma;  Surgeon: Jean Aguiar MD;  Location: [facility] OR;  Service: General;  Laterality: Right;  Evacuation of right groin/scrotal hematoma/seroma    Gastric Ulcer Sx  2009    Dr. aguiar    HERNIA REPAIR Right     Open (6 years old)    INSERTION OF PACEMAKER  2022    REPAIR, HERNIA, INCISIONAL  2024    Procedure: REPAIR, HERNIA, INCISIONAL;  Surgeon: Jean Aguiar MD;  Location: [facility] OR;  Service: General;;  Open abdominal incisional hernia repair with mesh.    REPAIR, HERNIA, INGUINAL Right 2024    Procedure: REPAIR, HERNIA, INGUINAL;  Surgeon: Jean Aguiar MD;  Location: [facility] OR;  Service: General;  Laterality: Right;  Open right inguinal hernia repair with mesh.     Family History   Problem Relation Name Age of Onset    ALS Mother      Cancer Father       Social History     Tobacco Use    Smoking status: Former     Current packs/day: 0.00     Types: Cigarettes     Quit date:      Years since quittin.9    Smokeless tobacco: Never   Substance Use Topics    Alcohol use: Never    Drug use: Never     Review of Systems   Constitutional:  Negative for chills and fever.   HENT:  Negative for congestion, rhinorrhea and sore throat.    Eyes:  Negative for visual disturbance.   Respiratory:  Negative for cough and shortness of breath.    Cardiovascular:  Negative for chest pain.   Gastrointestinal:  Positive for nausea. Negative for abdominal pain and vomiting.        +food bolus   Genitourinary:  Negative for dysuria and hematuria.   Musculoskeletal:  Negative for joint swelling.   Skin:  Negative for rash.   Neurological:  Negative for weakness.   Psychiatric/Behavioral:  Negative for confusion.    All other systems reviewed and are negative.      Physical Exam     Initial Vitals   BP Pulse Resp Temp SpO2   240 24 19124   (!) 150/103 94 20 97.7 °F  (36.5 °C) 99 %      MAP       --                Physical Exam    Nursing note and vitals reviewed.  Constitutional: He is not diaphoretic. No distress.   Pt is sitting upright.    HENT:   Head: Normocephalic and atraumatic.   Neck: Neck supple.   Normal range of motion.  Cardiovascular:  Normal rate and regular rhythm.           Pulmonary/Chest: Breath sounds normal. No respiratory distress.   Abdominal: Abdomen is soft. He exhibits no distension. There is no abdominal tenderness.   Spitting in emesis bag.    Musculoskeletal:         General: No edema.      Cervical back: Normal range of motion and neck supple.     Neurological: He is alert and oriented to person, place, and time. He has normal strength. No cranial nerve deficit or sensory deficit.   Skin: Skin is warm. Capillary refill takes less than 2 seconds.   Psychiatric: He has a normal mood and affect.         ED Course   Procedures  Labs Reviewed   COMPREHENSIVE METABOLIC PANEL - Abnormal       Result Value    Sodium 136      Potassium 4.0      Chloride 102      CO2 24      Glucose 362 (*)     Blood Urea Nitrogen 6.7 (*)     Creatinine 0.91      Calcium 8.3 (*)     Protein Total 6.7      Albumin 3.8      Globulin 2.9      Albumin/Globulin Ratio 1.3      Bilirubin Total 4.1 (*)     ALP 67      ALT 5      AST 13      eGFR >60      Anion Gap 10.0      BUN/Creatinine Ratio 7     CBC WITH DIFFERENTIAL - Abnormal    WBC 5.05      RBC 4.93      Hgb 14.3      Hct 41.5 (*)     MCV 84.2      MCH 29.0      MCHC 34.5      RDW 13.4      Platelet 138      MPV 9.9      Neut % 71.5      Lymph % 21.8      Mono % 5.5      Eos % 0.2      Basophil % 0.6      Lymph # 1.10      Neut # 3.61      Mono # 0.28      Eos # 0.01      Baso # 0.03      IG# 0.02      IG% 0.4      NRBC% 0.0      IPF 3.6     CBC W/ AUTO DIFFERENTIAL    Narrative:     The following orders were created for panel order CBC auto differential.  Procedure                               Abnormality         Status                      ---------                               -----------         ------                     CBC with Differential[[id number]]       Abnormal            Final result                 Please view results for these tests on the individual orders.     EKG Readings: (Independently Interpreted)   Rhythm: Paced Rhythm (ventricular). Heart Rate: 84. Ectopy: PACs. Conduction: Normal. ST Segments: Normal ST Segments. T Waves: Normal. Clinical Impression: Normal Sinus Rhythm Other Impression: no ischemic changes   EKG performed at 21:40.        Imaging Results    None          Medications   glucagon (human recombinant) injection 1 mg (1 mg Intravenous Given 12/11/24 2000)   ondansetron injection 4 mg (4 mg Intravenous Given 12/11/24 2021)   insulin regular injection 8 Units 0.08 mL (4 Units Intravenous Given 12/11/24 2225)     Medical Decision Making  64-year-old with a history of esophageal stricture food bolus requiring intervention presenting with symptoms of impacted food bolus after eating boudin  Exam as above spitting up but well-appearing airway  Consulted GI who take patient to GI lab for EGD    Differential diagnosis (including but not limited to):   Esophageal stricture, achlasia    Problems Addressed:  Food impaction of esophagus, initial encounter: acute illness or injury that poses a threat to life or bodily functions    Amount and/or Complexity of Data Reviewed  Discussion of management or test interpretation with external provider(s): Discussed with GI Dr. De La Garza - will take to GI lab    Risk  Prescription drug management.  Decision regarding hospitalization.  Emergency major surgery.              Attending Attestation:           Physician Attestation for Scribe:  Physician Attestation Statement for Scribe #1: I, Vasquez Pro IV, MD, reviewed documentation, as scribed by Patrica Syed in my presence, and it is both accurate and complete.             ED Course as of 12/12/24 0251   Wed Dec 11, 2024    2020 Paged GI [TB]      ED Course User Index  [TB] Patrica Syed                           Clinical Impression:  Final diagnoses:  [T18.128A, W44.F3XA] Food impaction of esophagus, initial encounter (Primary)          ED Disposition Condition    Admit Stable                Curet, Vasquez DUONG IV, MD  12/12/24 4186

## 2024-12-12 NOTE — CONSULTS
[facility] Services  Gastroenterology  Consult Note    Patient Name: Tashi Deluna  MRN: [id number]  Admission Date: 12/11/2024  Hospital Length of Stay: 0 days  Code Status: Prior   Attending Provider: No att. providers found   Consulting Provider: Samuel De La Garza MD  Primary Care Physician: Guevara Torres MD  Principal Problem:<principal problem not specified>    Consults  Subjective:     HPI:  64 year old male here with esophageal food impaction.  Patient had esophageal food impaction in 2021 and was supposed to have repeat EGD with eliquis held for dilation, but this was not done it appears, and has had issues prior to this as well requiring emergent EGD in 2020 for esophageal food impaction.  He was on omperazole in the past, but notes he stopped in > 6 months ago and was taking other over the counter antacids, possibly tums and pepcid, he was not sure.  He is on eliquis every 12 hours, last dose earlier today.    Past Medical History:   Diagnosis Date    Atrial fibrillation     CHF (congestive heart failure)     Dysphagia     Fatigue     GERD (gastroesophageal reflux disease)     History of acute pancreatitis     History of anxiety     Incisional hernia of anterior abdominal wall without obstruction or gangrene     Inguinal hernia, bilateral     Neuropathy, diabetic     SOB (shortness of breath)     Type 1 diabetes mellitus     Weakness        Past Surgical History:   Procedure Laterality Date    ABLATION N/A 07/05/2022    Procedure: ABLATION;  Surgeon: Kin Hubbard MD;  Location: [facility] CATH LAB;  Service: Cardiology;  Laterality: N/A;  AV NODE ABLATION W/ ANEST.    CARDIAC ELECTROPHYSIOLOGY STUDY AND ABLATION      CARDIOVERSION      COLONOSCOPY  2021    EVACUATION, HEMATOMA, INGUINAL REGION Right 7/29/2024    Procedure: EVACUATION, HEMATOMA, INGUINAL REGION  //right  / Evacuation of right groin/scrotal hematoma/seroma;  Surgeon: Jean Tierney MD;  Location: [facility] OR;  Service:  General;  Laterality: Right;  Evacuation of right groin/scrotal hematoma/seroma    Gastric Ulcer Sx  2009    Dr. aguiar    HERNIA REPAIR Right     Open (6 years old)    INSERTION OF PACEMAKER  2022    REPAIR, HERNIA, INCISIONAL  2024    Procedure: REPAIR, HERNIA, INCISIONAL;  Surgeon: Jean Aguiar MD;  Location: [facility] OR;  Service: General;;  Open abdominal incisional hernia repair with mesh.    REPAIR, HERNIA, INGUINAL Right 2024    Procedure: REPAIR, HERNIA, INGUINAL;  Surgeon: Jean Aguiar MD;  Location: [facility] OR;  Service: General;  Laterality: Right;  Open right inguinal hernia repair with mesh.       Review of patient's allergies indicates:   Allergen Reactions    Amoxicillin Rash     Family History       Problem Relation (Age of Onset)    ALS Mother    Cancer Father          Tobacco Use    Smoking status: Former     Current packs/day: 0.00     Types: Cigarettes     Quit date:      Years since quittin.9    Smokeless tobacco: Never   Substance and Sexual Activity    Alcohol use: Never    Drug use: Never    Sexual activity: Not on file     Review of Systems   Constitutional:  Negative for chills and fever.   HENT:  Negative for mouth sores.    Eyes:  Negative for visual disturbance.   Respiratory:  Negative for cough and shortness of breath.    Cardiovascular:  Negative for chest pain.   Gastrointestinal:  Positive for nausea and vomiting. Negative for abdominal pain, constipation and diarrhea.   Endocrine: Negative for cold intolerance and heat intolerance.   Genitourinary:  Negative for frequency and urgency.   Musculoskeletal:  Negative for back pain.   Skin:  Negative for rash.   Neurological:  Negative for headaches.   Psychiatric/Behavioral:  Negative for agitation and behavioral problems.      Objective:     Vital Signs (Most Recent):  Temp: 97.5 °F (36.4 °C) (24)  Pulse: 80 (24)  Resp: 17 (24)  BP: (!) 146/100 (24)  SpO2: 99 %  (room air) (12/11/24 2213) Vital Signs (24h Range):  Temp:  [97.5 °F (36.4 °C)-97.7 °F (36.5 °C)] 97.5 °F (36.4 °C)  Pulse:  [80-94] 80  Resp:  [17-20] 17  SpO2:  [99 %] 99 %  BP: (146-150)/(100-103) 146/100     Weight: 72.6 kg (160 lb) (12/11/24 1920)  Body mass index is 23.63 kg/m².    No intake or output data in the 24 hours ending 12/11/24 2221    Lines/Drains/Airways       Peripheral Intravenous Line  Duration                  Peripheral IV - Single Lumen 12/11/24 2000 20 G Left Antecubital <1 day                    Physical Exam  Constitutional:       General: He is not in acute distress.     Appearance: He is well-developed. He is not diaphoretic.   HENT:      Head: Normocephalic and atraumatic.      Right Ear: External ear normal.      Left Ear: External ear normal.      Nose: Nose normal.   Eyes:      General: No scleral icterus.        Right eye: No discharge.         Left eye: No discharge.      Conjunctiva/sclera: Conjunctivae normal.   Cardiovascular:      Rate and Rhythm: Normal rate and regular rhythm.      Heart sounds: No murmur heard.     No friction rub.   Pulmonary:      Effort: Pulmonary effort is normal. No respiratory distress.      Breath sounds: Normal breath sounds. No wheezing.   Abdominal:      General: Bowel sounds are normal. There is no distension.      Palpations: Abdomen is soft.      Tenderness: There is no abdominal tenderness.   Musculoskeletal:         General: No deformity.      Cervical back: Normal range of motion and neck supple.   Skin:     General: Skin is warm.   Neurological:      Mental Status: He is alert and oriented to person, place, and time.   Psychiatric:         Behavior: Behavior normal.         Significant Labs:  All pertinent lab results from the last 24 hours have been reviewed.    Significant Imaging:  Imaging results within the past 24 hours have been reviewed.    Assessment/Plan:     There are no hospital problems to display for this patient.    Esophageal  food impaction, not able to tolerate secretions, urgent upper endoscopy with anesthesia support.     Procedure risks and benefits were discussed in detail today and an opportunity to answer patient/family questions was provided.        Thank you for your consult.    Samuel De La Garza MD  Gastroenterology  [facility] - Periop Services

## 2024-12-12 NOTE — ANESTHESIA PROCEDURE NOTES
Intubation    Date/Time: 12/11/2024 10:32 PM    Performed by: Adalid Sellers CRNA  Authorized by: Mt Sanchez DO    Intubation:     Induction:  Rapid sequence induction    Intubated:  Postinduction    Mask Ventilation:  Not attempted    Attempts:  1    Attempted By:  CRNA    Method of Intubation:  Direct    Blade:  Mendez 2    Laryngeal View Grade: Grade IIA - cords partially seen      Difficult Airway Encountered?: No      Complications:  None    Airway Device:  Oral endotracheal tube    Airway Device Size:  7.5    Style/Cuff Inflation:  Cuffed    Inflation Amount (mL):  6    Tube secured:  23    Secured at:  The lips    Placement Verified By:  Capnometry    Complicating Factors:  None    Findings Post-Intubation:  BS equal bilateral and atraumatic/condition of teeth unchanged

## 2024-12-12 NOTE — ANESTHESIA PREPROCEDURE EVALUATION
12/11/2024  Tashi Deluna is a 64 y.o., male.      Pre-op Assessment    I have reviewed the Patient Summary Reports.     I have reviewed the Nursing Notes. I have reviewed the NPO Status.   I have reviewed the Medications.     Review of Systems  Anesthesia Hx:  No problems with previous Anesthesia                Social:  Former Smoker Quit smoking 1992      Cardiovascular:    Pacemaker (pacer, hx ablation)  Denies Hypertension.   Denies MI.     Dysrhythmias (a-fib with RVR) atrial fibrillation  CHF        Hx hypotension        Shortness of Breath       Congestive Heart Failure (CHF)                  Atrial Fibrillation     Pulmonary:    Denies COPD.  Denies Asthma.  Shortness of breath                  Renal/:   Denies Chronic Renal Disease. no renal calculi               Hepatic/GI:     GERD, well controlled Denies Liver Disease.  Denies Hepatitis.       Gerd          Neurological:    Denies CVA.    Denies Seizures.          Peripheral Neuropathy                          Endocrine:  Diabetes, poorly controlled, type 1 Denies Hypothyroidism.  Denies Hyperthyroidism.  Diabetes                    Denies Obesity / BMI > 30  Psych:   anxiety                 Physical Exam  General: Well nourished, Cooperative, Alert and Oriented    Airway:  Mallampati: I   Mouth Opening: Normal  TM Distance: Normal  Tongue: Normal  Neck ROM: Normal ROM    Dental:  Intact        Anesthesia Plan  Type of Anesthesia, risks & benefits discussed:    Anesthesia Type: Gen ETT  Intra-op Monitoring Plan: Standard ASA Monitors  Induction:  IV  Airway Plan: Video  Informed Consent: Informed consent signed with the Patient and all parties understand the risks and agree with anesthesia plan.  All questions answered.   ASA Score: 3  Day of Surgery Review of History & Physical: H&P Update referred to the surgeon/provider.    Ready For Surgery  From Anesthesia Perspective.     .

## 2024-12-13 LAB
OHS QRS DURATION: 130 MS
OHS QTC CALCULATION: 522 MS

## 2025-02-19 NOTE — PROGRESS NOTES
[facility] Surgical - General Surgery Services  [address] [place] [id number]  Phone: [phone]  Fax: [phone]     HISTORY & PHYSICAL  General Surgery  Dr. Jean Tierney      Patient Name: Tashi Deluna  YOB: 1960  Author: Ana María Rogel, ANP     Date: 02/20/2025                   SUBJECTIVE:     Chief Complaint/Reason for Admission:Incisional hernia, Left Inguinal hernia       History of Present Illness:  Mr. Tashi Deluna is a 64 y.o. male who presents to clinic for surgical evaluation of abdominal ventral hernia.   He started noticing a bulge to abdominal wall.  It occasionally causes some pain and discomfort.   Denies any changes to bowel / bladder habits. He denies CP/SOB or fever/chills.     Positive symptoms:   + abdominal bulge to midline, + left groin bulge     S/p evacuation of right scrotal post op hematoma, seroma 7/29/2024 per Dr. Tierney.      S/p Diagnostic laparoscopy, Laparoscopic reduction of right inguinal hernia (large, incarcerated), and Laparoscopic right inguinal hernia repair with mesh TEPP 2/26/24 per Dr. Tierney. Incisional hernia was deferred at this time due to complexity of right inguinal hernia repair.      Patient Care Team:  Guevara Torres MD as PCP - General (Family Medicine)  Jean Tierney MD as Surgeon (General Surgery)  Kin Hubbard MD as Consulting Physician (Cardiology)  Jus Douglass MD (Endocrinology)  Kyler Mckeon MD (Gastroenterology)    Review of Systems:  12 point ROS negative except as stated in HPI    PAST HISTORY:     Past Medical History:   Diagnosis Date    Atrial fibrillation     CHF (congestive heart failure)     Dysphagia     Fatigue     GERD (gastroesophageal reflux disease)     History of acute pancreatitis     History of anxiety     Incisional hernia of anterior abdominal wall without obstruction or gangrene     Inguinal hernia, bilateral     Neuropathy, diabetic     SOB  (shortness of breath)     Type 1 diabetes mellitus     Weakness      Past Surgical History:   Procedure Laterality Date    ABLATION N/A 2022    Procedure: ABLATION;  Surgeon: Kin Hubbard MD;  Location: [facility] CATH LAB;  Service: Cardiology;  Laterality: N/A;  AV NODE ABLATION W/ ANEST.    CARDIAC ELECTROPHYSIOLOGY STUDY AND ABLATION      CARDIOVERSION      COLONOSCOPY      EGD, WITH FOREIGN BODY REMOVAL N/A 2024    Procedure: EGD, WITH FOREIGN BODY REMOVAL;  Surgeon: Samuel De La Garza MD;  Location: [facility] OR;  Service: Gastroenterology;  Laterality: N/A;    EVACUATION, HEMATOMA, INGUINAL REGION Right 2024    Procedure: EVACUATION, HEMATOMA, INGUINAL REGION  //right  / Evacuation of right groin/scrotal hematoma/seroma;  Surgeon: Jean Aguiar MD;  Location: [facility] OR;  Service: General;  Laterality: Right;  Evacuation of right groin/scrotal hematoma/seroma    Gastric Ulcer Sx      Dr. aguiar    HERNIA REPAIR Right     Open (6 years old)    INSERTION OF PACEMAKER  2022    REPAIR, HERNIA, INCISIONAL  2024    Procedure: REPAIR, HERNIA, INCISIONAL;  Surgeon: Jean Aguiar MD;  Location: [facility];  Service: General;;  Open abdominal incisional hernia repair with mesh.    REPAIR, HERNIA, INGUINAL Right 2024    Procedure: REPAIR, HERNIA, INGUINAL;  Surgeon: Jean Aguiar MD;  Location: [facility];  Service: General;  Laterality: Right;  Open right inguinal hernia repair with mesh.     Family History   Problem Relation Name Age of Onset    ALS Mother      Cancer Father       Social History     Socioeconomic History    Marital status: Single   Tobacco Use    Smoking status: Former     Current packs/day: 0.00     Types: Cigarettes     Quit date:      Years since quittin.1    Smokeless tobacco: Never   Substance and Sexual Activity    Alcohol use: Never    Drug use: Never     Social Drivers of Health     Financial Resource Strain: Low Risk  (2024)    Overall  Financial Resource Strain (CARDIA)     Difficulty of Paying Living Expenses: Not very hard   Food Insecurity: Food Insecurity Present (11/13/2024)    Hunger Vital Sign     Worried About Running Out of Food in the Last Year: Sometimes true     Ran Out of Food in the Last Year: Sometimes true   Physical Activity: Unknown (11/13/2024)    Exercise Vital Sign     Days of Exercise per Week: 0 days   Stress: No Stress Concern Present (11/13/2024)    [employer] [place] [employer] - Occupational Stress Questionnaire     Feeling of Stress : Only a little       MEDICATIONS & ALLERGIES:     Current Outpatient Medications on File Prior to Visit   Medication Sig    amiodarone (PACERONE) 400 MG tablet Take 0.5 tablets (200 mg total) by mouth 2 (two) times daily. Amiodarone 200mg tablets 400mg twice daily x 3 days then 200mg twice daily x 3 days then 200mg daily    cyanocobalamin, vitamin B-12, (VITAMIN B-12 ORAL) Take by mouth.    diphenhydrAMINE (BENADRYL ALLERGY) 25 mg tablet Take 25 mg by mouth daily as needed for Allergies.    ELIQUIS 5 mg Tab Take 5 mg by mouth 2 (two) times daily.    ENTRESTO 24-26 mg per tablet Take 1 tablet by mouth 2 (two) times daily.    ergocalciferol, vitamin D2, (VITAMIN D ORAL) Take by mouth.    famotidine (PEPCID) 10 MG tablet Take 10 mg by mouth 2 (two) times daily.    insulin regular 100 unit/mL Inj injection Inject into the skin 3 (three) times daily before meals. Per sliding scale    LANTUS U-100 INSULIN 100 unit/mL injection Inject 10 Units into the skin Daily. Per sliding scale    magnesium oxide 500 mg Tab Take 1 tablet by mouth once daily.    metoprolol succinate (TOPROL-XL) 100 MG 24 hr tablet Take 100 mg by mouth once daily.    pantoprazole (PROTONIX) 40 MG tablet Take 40 mg by mouth once daily.    vitamin D (VITAMIN D3) 1000 units Tab Take 1,000 Units by mouth once daily.     Current Facility-Administered Medications on File Prior to Visit   Medication    lactated ringers  "infusion     Review of patient's allergies indicates:   Allergen Reactions    Amoxicillin Rash       OBJECTIVE:     Vitals:    02/20/25 0946 02/20/25 0948   BP: (!) 159/92 (!) 169/88   Pulse: 86 88   Weight: 73 kg (161 lb)    Height: 5' 9" (1.753 m)      Body mass index is 23.78 kg/m².    Physical Exam:  General:  Well developed, well nourished, no acute distress  HEENT:  Normocephalic, atraumatic, PERRL, EOMI, clear sclera, ears normal, neck supple, throat clear without erythema or exudates  CVS:  RRR, S1 and S2 normal, no murmurs, rubs, gallops  Resp:  Lungs clear to auscultation, no wheezes, rales, rhonchi, cough  GI: Midline reducible incisional hernia, reducible left inguinal hernia.   :  Deferred  MSK:  No muscle atrophy, cyanosis, peripheral edema, full range of motion  Skin:  No rashes, ulcers, erythema  Neuro:  CNII-XII grossly intact  Psych:  Alert and oriented to person, place, and time    Results:  I have independently reviewed all pertinent lab and radiologic studies relevant to general surgery.      VISIT DIAGNOSES:       ICD-10-CM ICD-9-CM   1. Incisional hernia of anterior abdominal wall without obstruction or gangrene  K43.2 553.21   2. Left inguinal hernia  K40.90 550.90       ASSESSMENT/PLAN:       Plan:  Open incisional hernia repair with component separation, open left inguinal hernia repair with open [place] approach. Could also consider laparoscopic approach, operative findings to dictate procedure performed.     Pre op cardiac clearance, labs, EKG, permission to hold blood thinner    Dr. Tienrey examined patient, discussed recommendations, obtained informed consent, and answered all questions with patient/family.     Counseling included differential diagnoses, treatment options, risks and benefits, lifestyle changes, and prognosis.  The patient was interactive, and verbalized understanding. Patient was able to ask questions and wishes to proceed.      Ana María Rogel, ANP         I, " MARSHA Bowen, am scribing for, and in the presence of, Jean Tierney MD, performed the above scribed service and the documentation accurately describes the services I performed. I attest to the accuracy of the note.

## 2025-02-20 ENCOUNTER — OFFICE VISIT (OUTPATIENT)
Dept: SURGERY | Facility: CLINIC | Age: 65
End: 2025-02-20
Payer: MEDICARE

## 2025-02-20 VITALS
DIASTOLIC BLOOD PRESSURE: 88 MMHG | HEIGHT: 69 IN | WEIGHT: 161 LBS | BODY MASS INDEX: 23.85 KG/M2 | HEART RATE: 88 BPM | SYSTOLIC BLOOD PRESSURE: 169 MMHG

## 2025-02-20 DIAGNOSIS — K40.90 LEFT INGUINAL HERNIA: ICD-10-CM

## 2025-02-20 DIAGNOSIS — K43.2 INCISIONAL HERNIA OF ANTERIOR ABDOMINAL WALL WITHOUT OBSTRUCTION OR GANGRENE: Primary | ICD-10-CM

## 2025-02-20 RX ORDER — ROSUVASTATIN CALCIUM 20 MG/1
20 TABLET, COATED ORAL NIGHTLY
COMMUNITY

## 2025-02-21 DIAGNOSIS — K43.2 INCISIONAL HERNIA OF ANTERIOR ABDOMINAL WALL WITHOUT OBSTRUCTION OR GANGRENE: ICD-10-CM

## 2025-02-21 DIAGNOSIS — K40.90 LEFT INGUINAL HERNIA: Primary | ICD-10-CM

## 2025-02-24 ENCOUNTER — TELEPHONE (OUTPATIENT)
Dept: SURGERY | Facility: CLINIC | Age: 65
End: 2025-02-24
Payer: MEDICARE

## 2025-02-28 ENCOUNTER — LAB VISIT (OUTPATIENT)
Dept: LAB | Facility: HOSPITAL | Age: 65
End: 2025-02-28
Attending: NURSE PRACTITIONER
Payer: MEDICARE

## 2025-02-28 DIAGNOSIS — I48.0 PAROXYSMAL ATRIAL FIBRILLATION: ICD-10-CM

## 2025-02-28 DIAGNOSIS — E10.9 DIABETES MELLITUS TYPE I: Primary | ICD-10-CM

## 2025-02-28 LAB
ALBUMIN SERPL-MCNC: 3.7 G/DL (ref 3.4–4.8)
ALBUMIN/GLOB SERPL: 1.3 RATIO (ref 1.1–2)
ALP SERPL-CCNC: 71 UNIT/L (ref 40–150)
ALT SERPL-CCNC: 14 UNIT/L (ref 0–55)
ANION GAP SERPL CALC-SCNC: 9 MEQ/L
AST SERPL-CCNC: 18 UNIT/L (ref 5–34)
BILIRUB SERPL-MCNC: 3.9 MG/DL
BUN SERPL-MCNC: 10.3 MG/DL (ref 8.4–25.7)
CALCIUM SERPL-MCNC: 8.9 MG/DL (ref 8.8–10)
CHLORIDE SERPL-SCNC: 104 MMOL/L (ref 98–107)
CHOLEST SERPL-MCNC: 124 MG/DL
CHOLEST/HDLC SERPL: 2 {RATIO} (ref 0–5)
CO2 SERPL-SCNC: 27 MMOL/L (ref 23–31)
CREAT SERPL-MCNC: 0.77 MG/DL (ref 0.72–1.25)
CREAT/UREA NIT SERPL: 13
GFR SERPLBLD CREATININE-BSD FMLA CKD-EPI: >60 ML/MIN/1.73/M2
GLOBULIN SER-MCNC: 2.9 GM/DL (ref 2.4–3.5)
GLUCOSE SERPL-MCNC: 187 MG/DL (ref 82–115)
HDLC SERPL-MCNC: 59 MG/DL (ref 35–60)
LDLC SERPL CALC-MCNC: 55 MG/DL (ref 50–140)
POTASSIUM SERPL-SCNC: 3.9 MMOL/L (ref 3.5–5.1)
PROT SERPL-MCNC: 6.6 GM/DL (ref 5.8–7.6)
SODIUM SERPL-SCNC: 140 MMOL/L (ref 136–145)
TRIGL SERPL-MCNC: 49 MG/DL (ref 34–140)
VLDLC SERPL CALC-MCNC: 10 MG/DL

## 2025-02-28 PROCEDURE — 80061 LIPID PANEL: CPT

## 2025-02-28 PROCEDURE — 36415 COLL VENOUS BLD VENIPUNCTURE: CPT

## 2025-02-28 PROCEDURE — 80053 COMPREHEN METABOLIC PANEL: CPT

## 2025-04-11 ENCOUNTER — TELEPHONE (OUTPATIENT)
Dept: SURGERY | Facility: CLINIC | Age: 65
End: 2025-04-11
Payer: MEDICARE

## 2025-04-11 NOTE — TELEPHONE ENCOUNTER
----- Message from Med Assistant Valero sent at 4/8/2025  9:30 AM CDT -----  Regarding: RE: FU CX CLEARANCE  Spoke to nichol. Stated didn't have cx. I emailed it to [email or web address] FU on request.  ----- Message -----  From: Marylu Pope MA  Sent: 4/8/2025  12:00 AM CDT  To: Marylu Pope MA; Kelsy Pena Staff  Subject: RE: FU CX CLEARANCE                              Spoke to [facility] --- resent request stated they never rec'd t. Refaxed it whille on phone w/ jack. FU on request  ----- Message -----  From: Marylu Pope MA  Sent: 4/2/2025  12:00 AM CDT  To: Kelsy Pena Staff  Subject: RE: FU CX CLEARANCE                              Spoke to [facility] resent cardiac cx. FU on request.  ----- Message -----  From: Marylu Pope MA  Sent: 3/13/2025  12:00 AM CDT  To: Kelsy Pena Staff  Subject: FU CX CLEARANCE                                  Ensure we get cardiac clearance .. Letter sent to RENITA Hubbard 2.20.25

## 2025-04-11 NOTE — TELEPHONE ENCOUNTER
Cardiac cx in , hold eliquis 48 hr prior to procedure and can continue once bleeding risk decreased. Reminder to call pt to hold eliquis

## 2025-04-14 ENCOUNTER — TELEPHONE (OUTPATIENT)
Dept: SURGERY | Facility: CLINIC | Age: 65
End: 2025-04-14
Payer: MEDICARE

## 2025-04-14 ENCOUNTER — HOSPITAL ENCOUNTER (OUTPATIENT)
Dept: CARDIOLOGY | Facility: HOSPITAL | Age: 65
Discharge: HOME | End: 2025-04-14
Attending: SURGERY
Payer: MEDICARE

## 2025-04-14 DIAGNOSIS — Z01.818 PREOP EXAMINATION: ICD-10-CM

## 2025-04-14 DIAGNOSIS — Z01.818 PREOP EXAMINATION: Primary | ICD-10-CM

## 2025-04-14 PROCEDURE — 93010 ELECTROCARDIOGRAM REPORT: CPT | Mod: ,,, | Performed by: STUDENT IN AN ORGANIZED HEALTH CARE EDUCATION/TRAINING PROGRAM

## 2025-04-14 PROCEDURE — 93005 ELECTROCARDIOGRAM TRACING: CPT

## 2025-04-14 NOTE — TELEPHONE ENCOUNTER
Pt called back . Stated he will get labs and EKG done at [facility]. Reminder set to FU also reminded him to hold his eliquis starting Sunday.

## 2025-04-14 NOTE — TELEPHONE ENCOUNTER
Called pt to remind pre op testing no answer LVM and instructed to call clinic back. Reminder to FU w/ pt

## 2025-04-14 NOTE — TELEPHONE ENCOUNTER
----- Message from Med Assistant Valero sent at 2/20/2025  4:33 PM CST -----  Regarding: PRE-OP reminder  Dx lap, open inc, ex lap, left lap ing. @ [facility] 4.23 pt will need PRE-OP test.

## 2025-04-15 LAB
OHS QRS DURATION: 136 MS
OHS QTC CALCULATION: 495 MS

## 2025-04-17 ENCOUNTER — TELEPHONE (OUTPATIENT)
Dept: SURGERY | Facility: CLINIC | Age: 65
End: 2025-04-17
Payer: MEDICARE

## 2025-04-17 NOTE — TELEPHONE ENCOUNTER
----- Message from Med Assistant Valero sent at 4/11/2025 11:18 AM CDT -----  Regarding: RE: FU CX CLEARANCE  Cardiac cx in , hold eliquis 48 hr prior to procedure and can continue once bleeding risk decreased. Reminder to call pt to hold eliquis  ----- Message -----  From: Marylu Pope MA  Sent: 4/11/2025  12:00 AM CDT  To: Kelsy Pena Staff  Subject: RE: FU CX CLEARANCE                              Spoke to nichol. Stated didn't have cx. I emailed it to [email or web address] FU on request.  ----- Message -----  From: Marylu Pope MA  Sent: 4/8/2025  12:00 AM CDT  To: Marylu Pope MA; Kelsy Pena Staff  Subject: RE: FU CX CLEARANCE                              Spoke to [facility] --- resent request stated they never rec'd t. Refaxed it whille on phone w/ jack. FU on request  ----- Message -----  From: Marylu Pope MA  Sent: 4/2/2025  12:00 AM CDT  To: Kelsy Pena Staff  Subject: RE: FU CX CLEARANCE                              Spoke to [facility] resent cardiac cx. FU on request.  ----- Message -----  From: Marylu Pope MA  Sent: 3/13/2025  12:00 AM CDT  To: Kelsy Pena Staff  Subject: FU CX CLEARANCE                                  Ensure we get cardiac clearance .. Letter sent to RENITA Hubbard 2.20.25

## 2025-04-17 NOTE — TELEPHONE ENCOUNTER
Rec'd message from pedro luis, attempted to reach pt again and apologize for the miscommunication. Left message for pt in regards to holding eliquis.

## 2025-04-23 ENCOUNTER — ANESTHESIA EVENT (OUTPATIENT)
Dept: SURGERY | Facility: HOSPITAL | Age: 65
End: 2025-04-23
Payer: MEDICARE

## 2025-04-23 ENCOUNTER — HOSPITAL ENCOUNTER (INPATIENT)
Facility: HOSPITAL | Age: 65
LOS: 9 days | Discharge: NURSING, REHAB OR LONG-TERM CARE | DRG: 351 | End: 2025-05-02
Attending: SURGERY | Admitting: SURGERY
Payer: MEDICARE

## 2025-04-23 ENCOUNTER — ANESTHESIA (OUTPATIENT)
Dept: SURGERY | Facility: HOSPITAL | Age: 65
End: 2025-04-23
Payer: MEDICARE

## 2025-04-23 DIAGNOSIS — K40.90 LEFT INGUINAL HERNIA: ICD-10-CM

## 2025-04-23 DIAGNOSIS — N99.840 POSTOPERATIVE HEMATOMA INVOLVING GENITOURINARY SYSTEM FOLLOWING GENITOURINARY PROCEDURE: ICD-10-CM

## 2025-04-23 DIAGNOSIS — K43.2 INCISIONAL HERNIA OF ANTERIOR ABDOMINAL WALL WITHOUT OBSTRUCTION OR GANGRENE: Primary | ICD-10-CM

## 2025-04-23 DIAGNOSIS — L76.32 POSTOPERATIVE HEMATOMA OF SUBCUTANEOUS TISSUE FOLLOWING NON-DERMATOLOGIC PROCEDURE: ICD-10-CM

## 2025-04-23 LAB — POCT GLUCOSE: 105 MG/DL (ref 70–110)

## 2025-04-23 PROCEDURE — 36000708 HC OR TIME LEV III 1ST 15 MIN: Performed by: SURGERY

## 2025-04-23 PROCEDURE — 63600175 PHARM REV CODE 636 W HCPCS: Performed by: NURSE PRACTITIONER

## 2025-04-23 PROCEDURE — 71000033 HC RECOVERY, INTIAL HOUR: Performed by: SURGERY

## 2025-04-23 PROCEDURE — 0YU64JZ SUPPLEMENT LEFT INGUINAL REGION WITH SYNTHETIC SUBSTITUTE, PERCUTANEOUS ENDOSCOPIC APPROACH: ICD-10-PCS | Performed by: SURGERY

## 2025-04-23 PROCEDURE — C1781 MESH (IMPLANTABLE): HCPCS | Performed by: SURGERY

## 2025-04-23 PROCEDURE — C1727 CATH, BAL TIS DIS, NON-VAS: HCPCS | Performed by: SURGERY

## 2025-04-23 PROCEDURE — 71000015 HC POSTOP RECOV 1ST HR: Performed by: SURGERY

## 2025-04-23 PROCEDURE — 11000001 HC ACUTE MED/SURG PRIVATE ROOM

## 2025-04-23 PROCEDURE — 36000709 HC OR TIME LEV III EA ADD 15 MIN: Performed by: SURGERY

## 2025-04-23 PROCEDURE — 63600175 PHARM REV CODE 636 W HCPCS: Performed by: ANESTHESIOLOGY

## 2025-04-23 PROCEDURE — 88302 TISSUE EXAM BY PATHOLOGIST: CPT

## 2025-04-23 PROCEDURE — 27201423 OPTIME MED/SURG SUP & DEVICES STERILE SUPPLY: Performed by: SURGERY

## 2025-04-23 PROCEDURE — 25000003 PHARM REV CODE 250

## 2025-04-23 PROCEDURE — C1729 CATH, DRAINAGE: HCPCS | Performed by: SURGERY

## 2025-04-23 PROCEDURE — 88304 TISSUE EXAM BY PATHOLOGIST: CPT | Performed by: SURGERY

## 2025-04-23 PROCEDURE — 63600175 PHARM REV CODE 636 W HCPCS

## 2025-04-23 PROCEDURE — 37000008 HC ANESTHESIA 1ST 15 MINUTES: Performed by: SURGERY

## 2025-04-23 PROCEDURE — 25000003 PHARM REV CODE 250: Performed by: NURSE PRACTITIONER

## 2025-04-23 PROCEDURE — 0WUF4JZ SUPPLEMENT ABDOMINAL WALL WITH SYNTHETIC SUBSTITUTE, PERCUTANEOUS ENDOSCOPIC APPROACH: ICD-10-PCS | Performed by: SURGERY

## 2025-04-23 PROCEDURE — 37000009 HC ANESTHESIA EA ADD 15 MINS: Performed by: SURGERY

## 2025-04-23 PROCEDURE — 25000003 PHARM REV CODE 250: Performed by: SURGERY

## 2025-04-23 DEVICE — OPTIMIZED COMPOSITE MESH, POLYESTER WITH ABSORBABLE COLLAGEN FILM
Type: IMPLANTABLE DEVICE | Site: ABDOMEN | Status: FUNCTIONAL
Brand: PARIETEX

## 2025-04-23 DEVICE — 3DMAX MESH, 10.8 CM X 16.0 CM (4.3" X 6.3"), LEFT, LARGE
Type: IMPLANTABLE DEVICE | Site: INGUINAL | Status: FUNCTIONAL
Brand: 3DMAX

## 2025-04-23 RX ORDER — LABETALOL HYDROCHLORIDE 5 MG/ML
10 INJECTION, SOLUTION INTRAVENOUS
Status: DISCONTINUED | OUTPATIENT
Start: 2025-04-23 | End: 2025-05-02 | Stop reason: HOSPADM

## 2025-04-23 RX ORDER — FAMOTIDINE 10 MG/1
10 TABLET ORAL 2 TIMES DAILY
Status: DISCONTINUED | OUTPATIENT
Start: 2025-04-24 | End: 2025-04-23 | Stop reason: SDUPTHER

## 2025-04-23 RX ORDER — ONDANSETRON 4 MG/1
4 TABLET, ORALLY DISINTEGRATING ORAL
Status: DISCONTINUED | OUTPATIENT
Start: 2025-04-23 | End: 2025-04-23 | Stop reason: HOSPADM

## 2025-04-23 RX ORDER — PROCHLORPERAZINE EDISYLATE 5 MG/ML
5 INJECTION INTRAMUSCULAR; INTRAVENOUS EVERY 6 HOURS PRN
Status: DISCONTINUED | OUTPATIENT
Start: 2025-04-23 | End: 2025-05-02 | Stop reason: HOSPADM

## 2025-04-23 RX ORDER — ONDANSETRON HYDROCHLORIDE 2 MG/ML
4 INJECTION, SOLUTION INTRAVENOUS DAILY PRN
Status: DISCONTINUED | OUTPATIENT
Start: 2025-04-23 | End: 2025-04-23 | Stop reason: HOSPADM

## 2025-04-23 RX ORDER — LIDOCAINE HYDROCHLORIDE 10 MG/ML
1 INJECTION, SOLUTION EPIDURAL; INFILTRATION; INTRACAUDAL; PERINEURAL ONCE
Status: DISCONTINUED | OUTPATIENT
Start: 2025-04-23 | End: 2025-04-23 | Stop reason: HOSPADM

## 2025-04-23 RX ORDER — AMIODARONE HYDROCHLORIDE 200 MG/1
200 TABLET ORAL 2 TIMES DAILY
Status: DISCONTINUED | OUTPATIENT
Start: 2025-04-24 | End: 2025-05-02 | Stop reason: HOSPADM

## 2025-04-23 RX ORDER — ENOXAPARIN SODIUM 100 MG/ML
40 INJECTION SUBCUTANEOUS DAILY
Status: DISCONTINUED | OUTPATIENT
Start: 2025-04-24 | End: 2025-04-24

## 2025-04-23 RX ORDER — METOPROLOL SUCCINATE 50 MG/1
100 TABLET, EXTENDED RELEASE ORAL DAILY
Status: DISCONTINUED | OUTPATIENT
Start: 2025-04-24 | End: 2025-05-02 | Stop reason: HOSPADM

## 2025-04-23 RX ORDER — ACETAMINOPHEN 10 MG/ML
INJECTION, SOLUTION INTRAVENOUS
Status: DISCONTINUED | OUTPATIENT
Start: 2025-04-23 | End: 2025-04-23

## 2025-04-23 RX ORDER — MEPERIDINE HYDROCHLORIDE 25 MG/ML
12.5 INJECTION INTRAMUSCULAR; INTRAVENOUS; SUBCUTANEOUS EVERY 10 MIN PRN
Status: DISCONTINUED | OUTPATIENT
Start: 2025-04-23 | End: 2025-04-23 | Stop reason: HOSPADM

## 2025-04-23 RX ORDER — HYDROMORPHONE HYDROCHLORIDE 2 MG/ML
0.4 INJECTION, SOLUTION INTRAMUSCULAR; INTRAVENOUS; SUBCUTANEOUS EVERY 5 MIN PRN
Status: DISCONTINUED | OUTPATIENT
Start: 2025-04-23 | End: 2025-04-23 | Stop reason: HOSPADM

## 2025-04-23 RX ORDER — ENOXAPARIN SODIUM 100 MG/ML
40 INJECTION SUBCUTANEOUS DAILY
Status: CANCELLED | OUTPATIENT
Start: 2025-04-24

## 2025-04-23 RX ORDER — PROCHLORPERAZINE EDISYLATE 5 MG/ML
5 INJECTION INTRAMUSCULAR; INTRAVENOUS EVERY 30 MIN PRN
Status: DISCONTINUED | OUTPATIENT
Start: 2025-04-23 | End: 2025-04-23 | Stop reason: HOSPADM

## 2025-04-23 RX ORDER — METHOCARBAMOL 100 MG/ML
1000 INJECTION, SOLUTION INTRAMUSCULAR; INTRAVENOUS ONCE
Status: COMPLETED | OUTPATIENT
Start: 2025-04-23 | End: 2025-04-23

## 2025-04-23 RX ORDER — HYOSCYAMINE SULFATE 0.12 MG/1
0.12 TABLET SUBLINGUAL EVERY 4 HOURS PRN
Status: DISCONTINUED | OUTPATIENT
Start: 2025-04-23 | End: 2025-05-02 | Stop reason: HOSPADM

## 2025-04-23 RX ORDER — HYDROMORPHONE HYDROCHLORIDE 2 MG/ML
0.2 INJECTION, SOLUTION INTRAMUSCULAR; INTRAVENOUS; SUBCUTANEOUS EVERY 5 MIN PRN
Status: DISCONTINUED | OUTPATIENT
Start: 2025-04-23 | End: 2025-04-23 | Stop reason: HOSPADM

## 2025-04-23 RX ORDER — MIDAZOLAM HYDROCHLORIDE 2 MG/2ML
1 INJECTION, SOLUTION INTRAMUSCULAR; INTRAVENOUS ONCE AS NEEDED
Status: COMPLETED | OUTPATIENT
Start: 2025-04-23 | End: 2025-04-23

## 2025-04-23 RX ORDER — GABAPENTIN 300 MG/1
300 CAPSULE ORAL
Status: DISCONTINUED | OUTPATIENT
Start: 2025-04-23 | End: 2025-04-23 | Stop reason: HOSPADM

## 2025-04-23 RX ORDER — SODIUM CHLORIDE, SODIUM LACTATE, POTASSIUM CHLORIDE, CALCIUM CHLORIDE 600; 310; 30; 20 MG/100ML; MG/100ML; MG/100ML; MG/100ML
INJECTION, SOLUTION INTRAVENOUS CONTINUOUS
Status: CANCELLED | OUTPATIENT
Start: 2025-04-23

## 2025-04-23 RX ORDER — FENTANYL CITRATE 50 UG/ML
INJECTION, SOLUTION INTRAMUSCULAR; INTRAVENOUS
Status: DISCONTINUED | OUTPATIENT
Start: 2025-04-23 | End: 2025-04-23

## 2025-04-23 RX ORDER — HYDROCODONE BITARTRATE AND ACETAMINOPHEN 7.5; 325 MG/1; MG/1
1 TABLET ORAL EVERY 6 HOURS PRN
Refills: 0 | Status: DISCONTINUED | OUTPATIENT
Start: 2025-04-23 | End: 2025-05-02 | Stop reason: HOSPADM

## 2025-04-23 RX ORDER — IBUPROFEN 200 MG
24 TABLET ORAL
Status: DISCONTINUED | OUTPATIENT
Start: 2025-04-23 | End: 2025-04-24

## 2025-04-23 RX ORDER — BUPIVACAINE HYDROCHLORIDE AND EPINEPHRINE 2.5; 5 MG/ML; UG/ML
INJECTION, SOLUTION EPIDURAL; INFILTRATION; INTRACAUDAL; PERINEURAL
Status: DISCONTINUED | OUTPATIENT
Start: 2025-04-23 | End: 2025-04-23 | Stop reason: HOSPADM

## 2025-04-23 RX ORDER — HYDRALAZINE HYDROCHLORIDE 20 MG/ML
10 INJECTION INTRAMUSCULAR; INTRAVENOUS EVERY 4 HOURS PRN
Status: DISCONTINUED | OUTPATIENT
Start: 2025-04-23 | End: 2025-05-02 | Stop reason: HOSPADM

## 2025-04-23 RX ORDER — ONDANSETRON HYDROCHLORIDE 2 MG/ML
4 INJECTION, SOLUTION INTRAVENOUS EVERY 6 HOURS PRN
Status: DISCONTINUED | OUTPATIENT
Start: 2025-04-23 | End: 2025-05-02 | Stop reason: HOSPADM

## 2025-04-23 RX ORDER — BUPIVACAINE HYDROCHLORIDE AND EPINEPHRINE 2.5; 5 MG/ML; UG/ML
INJECTION, SOLUTION EPIDURAL; INFILTRATION; INTRACAUDAL; PERINEURAL
Status: DISPENSED
Start: 2025-04-23 | End: 2025-04-23

## 2025-04-23 RX ORDER — LEVOFLOXACIN 5 MG/ML
500 INJECTION, SOLUTION INTRAVENOUS
Status: DISCONTINUED | OUTPATIENT
Start: 2025-04-23 | End: 2025-04-23 | Stop reason: HOSPADM

## 2025-04-23 RX ORDER — MUPIROCIN 20 MG/G
OINTMENT TOPICAL 2 TIMES DAILY
Status: DISPENSED | OUTPATIENT
Start: 2025-04-23 | End: 2025-04-28

## 2025-04-23 RX ORDER — ONDANSETRON 4 MG/1
4 TABLET, ORALLY DISINTEGRATING ORAL
Status: CANCELLED | OUTPATIENT
Start: 2025-04-23 | End: 2025-04-23

## 2025-04-23 RX ORDER — PHENYLEPHRINE HYDROCHLORIDE 10 MG/ML
INJECTION INTRAVENOUS
Status: DISCONTINUED | OUTPATIENT
Start: 2025-04-23 | End: 2025-04-23

## 2025-04-23 RX ORDER — SODIUM CHLORIDE, SODIUM LACTATE, POTASSIUM CHLORIDE, CALCIUM CHLORIDE 600; 310; 30; 20 MG/100ML; MG/100ML; MG/100ML; MG/100ML
INJECTION, SOLUTION INTRAVENOUS CONTINUOUS
Status: DISCONTINUED | OUTPATIENT
Start: 2025-04-23 | End: 2025-04-24

## 2025-04-23 RX ORDER — TRAMADOL HYDROCHLORIDE 50 MG/1
50 TABLET, FILM COATED ORAL EVERY 4 HOURS PRN
Status: DISCONTINUED | OUTPATIENT
Start: 2025-04-23 | End: 2025-05-02 | Stop reason: HOSPADM

## 2025-04-23 RX ORDER — GLUCAGON 1 MG
1 KIT INJECTION
Status: DISCONTINUED | OUTPATIENT
Start: 2025-04-23 | End: 2025-04-24

## 2025-04-23 RX ORDER — ONDANSETRON HYDROCHLORIDE 2 MG/ML
INJECTION, SOLUTION INTRAMUSCULAR; INTRAVENOUS
Status: DISCONTINUED | OUTPATIENT
Start: 2025-04-23 | End: 2025-04-23

## 2025-04-23 RX ORDER — DIPHENHYDRAMINE HYDROCHLORIDE 50 MG/ML
25 INJECTION, SOLUTION INTRAMUSCULAR; INTRAVENOUS EVERY 6 HOURS PRN
Status: DISCONTINUED | OUTPATIENT
Start: 2025-04-23 | End: 2025-05-02 | Stop reason: HOSPADM

## 2025-04-23 RX ORDER — LABETALOL HYDROCHLORIDE 5 MG/ML
INJECTION, SOLUTION INTRAVENOUS
Status: DISCONTINUED | OUTPATIENT
Start: 2025-04-23 | End: 2025-04-23

## 2025-04-23 RX ORDER — GABAPENTIN 300 MG/1
300 CAPSULE ORAL
Status: CANCELLED | OUTPATIENT
Start: 2025-04-23 | End: 2025-04-23

## 2025-04-23 RX ORDER — SODIUM CHLORIDE, SODIUM GLUCONATE, SODIUM ACETATE, POTASSIUM CHLORIDE AND MAGNESIUM CHLORIDE 30; 37; 368; 526; 502 MG/100ML; MG/100ML; MG/100ML; MG/100ML; MG/100ML
INJECTION, SOLUTION INTRAVENOUS CONTINUOUS
Status: DISCONTINUED | OUTPATIENT
Start: 2025-04-23 | End: 2025-04-26

## 2025-04-23 RX ORDER — HEPARIN SODIUM 5000 [USP'U]/ML
5000 INJECTION, SOLUTION INTRAVENOUS; SUBCUTANEOUS ONCE
Status: COMPLETED | OUTPATIENT
Start: 2025-04-23 | End: 2025-04-23

## 2025-04-23 RX ORDER — IPRATROPIUM BROMIDE AND ALBUTEROL SULFATE 2.5; .5 MG/3ML; MG/3ML
3 SOLUTION RESPIRATORY (INHALATION)
Status: DISCONTINUED | OUTPATIENT
Start: 2025-04-23 | End: 2025-04-23 | Stop reason: HOSPADM

## 2025-04-23 RX ORDER — ROCURONIUM BROMIDE 10 MG/ML
INJECTION, SOLUTION INTRAVENOUS
Status: DISCONTINUED | OUTPATIENT
Start: 2025-04-23 | End: 2025-04-23

## 2025-04-23 RX ORDER — GLUCAGON 1 MG
1 KIT INJECTION
Status: DISCONTINUED | OUTPATIENT
Start: 2025-04-23 | End: 2025-04-23 | Stop reason: HOSPADM

## 2025-04-23 RX ORDER — MORPHINE SULFATE 4 MG/ML
2 INJECTION, SOLUTION INTRAMUSCULAR; INTRAVENOUS
Status: DISCONTINUED | OUTPATIENT
Start: 2025-04-23 | End: 2025-05-02 | Stop reason: HOSPADM

## 2025-04-23 RX ORDER — HYDROCODONE BITARTRATE AND ACETAMINOPHEN 5; 325 MG/1; MG/1
1 TABLET ORAL EVERY 4 HOURS PRN
Status: DISCONTINUED | OUTPATIENT
Start: 2025-04-23 | End: 2025-04-23

## 2025-04-23 RX ORDER — CLONIDINE 0.1 MG/24H
1 PATCH, EXTENDED RELEASE TRANSDERMAL ONCE AS NEEDED
Status: DISCONTINUED | OUTPATIENT
Start: 2025-04-23 | End: 2025-05-02 | Stop reason: HOSPADM

## 2025-04-23 RX ORDER — IBUPROFEN 200 MG
16 TABLET ORAL
Status: DISCONTINUED | OUTPATIENT
Start: 2025-04-23 | End: 2025-04-24

## 2025-04-23 RX ORDER — PROPOFOL 10 MG/ML
VIAL (ML) INTRAVENOUS
Status: DISCONTINUED | OUTPATIENT
Start: 2025-04-23 | End: 2025-04-23

## 2025-04-23 RX ORDER — PROMETHAZINE HYDROCHLORIDE 25 MG/ML
12.5 INJECTION, SOLUTION INTRAMUSCULAR; INTRAVENOUS EVERY 6 HOURS PRN
Status: DISCONTINUED | OUTPATIENT
Start: 2025-04-23 | End: 2025-05-02 | Stop reason: HOSPADM

## 2025-04-23 RX ORDER — ACETAMINOPHEN 10 MG/ML
1000 INJECTION, SOLUTION INTRAVENOUS EVERY 8 HOURS
Status: DISPENSED | OUTPATIENT
Start: 2025-04-23 | End: 2025-04-24

## 2025-04-23 RX ORDER — INSULIN ASPART 100 [IU]/ML
1-10 INJECTION, SOLUTION INTRAVENOUS; SUBCUTANEOUS
Status: DISCONTINUED | OUTPATIENT
Start: 2025-04-23 | End: 2025-04-24

## 2025-04-23 RX ORDER — KETOROLAC TROMETHAMINE 30 MG/ML
15 INJECTION, SOLUTION INTRAMUSCULAR; INTRAVENOUS EVERY 6 HOURS
Status: DISPENSED | OUTPATIENT
Start: 2025-04-23 | End: 2025-04-26

## 2025-04-23 RX ORDER — PANTOPRAZOLE SODIUM 40 MG/1
40 TABLET, DELAYED RELEASE ORAL DAILY
Status: DISCONTINUED | OUTPATIENT
Start: 2025-04-24 | End: 2025-05-02 | Stop reason: HOSPADM

## 2025-04-23 RX ORDER — ACETAMINOPHEN 500 MG
1000 TABLET ORAL
Status: CANCELLED | OUTPATIENT
Start: 2025-04-23 | End: 2025-04-23

## 2025-04-23 RX ORDER — SODIUM CHLORIDE, SODIUM LACTATE, POTASSIUM CHLORIDE, CALCIUM CHLORIDE 600; 310; 30; 20 MG/100ML; MG/100ML; MG/100ML; MG/100ML
INJECTION, SOLUTION INTRAVENOUS CONTINUOUS
Status: DISCONTINUED | OUTPATIENT
Start: 2025-04-23 | End: 2025-04-29

## 2025-04-23 RX ORDER — CELECOXIB 200 MG/1
200 CAPSULE ORAL
Status: DISCONTINUED | OUTPATIENT
Start: 2025-04-23 | End: 2025-04-23 | Stop reason: HOSPADM

## 2025-04-23 RX ORDER — LORAZEPAM 2 MG/ML
0.25 INJECTION INTRAMUSCULAR ONCE AS NEEDED
Status: DISCONTINUED | OUTPATIENT
Start: 2025-04-23 | End: 2025-04-23 | Stop reason: HOSPADM

## 2025-04-23 RX ORDER — MIDAZOLAM HYDROCHLORIDE 2 MG/2ML
2 INJECTION, SOLUTION INTRAMUSCULAR; INTRAVENOUS
Status: DISCONTINUED | OUTPATIENT
Start: 2025-04-23 | End: 2025-04-23 | Stop reason: HOSPADM

## 2025-04-23 RX ORDER — ONDANSETRON 4 MG/1
8 TABLET, ORALLY DISINTEGRATING ORAL EVERY 6 HOURS PRN
Status: DISCONTINUED | OUTPATIENT
Start: 2025-04-23 | End: 2025-04-23 | Stop reason: HOSPADM

## 2025-04-23 RX ORDER — MIDAZOLAM HYDROCHLORIDE 2 MG/2ML
2 INJECTION, SOLUTION INTRAMUSCULAR; INTRAVENOUS
Status: CANCELLED | OUTPATIENT
Start: 2025-04-23

## 2025-04-23 RX ORDER — LIDOCAINE HYDROCHLORIDE 10 MG/ML
INJECTION, SOLUTION EPIDURAL; INFILTRATION; INTRACAUDAL; PERINEURAL
Status: DISCONTINUED | OUTPATIENT
Start: 2025-04-23 | End: 2025-04-23

## 2025-04-23 RX ORDER — KETOROLAC TROMETHAMINE 30 MG/ML
INJECTION, SOLUTION INTRAMUSCULAR; INTRAVENOUS
Status: DISCONTINUED | OUTPATIENT
Start: 2025-04-23 | End: 2025-04-23

## 2025-04-23 RX ORDER — LEVOFLOXACIN 5 MG/ML
500 INJECTION, SOLUTION INTRAVENOUS
Status: CANCELLED | OUTPATIENT
Start: 2025-04-23

## 2025-04-23 RX ORDER — ACETAMINOPHEN 500 MG
1000 TABLET ORAL
Status: DISCONTINUED | OUTPATIENT
Start: 2025-04-23 | End: 2025-04-23 | Stop reason: HOSPADM

## 2025-04-23 RX ORDER — DEXAMETHASONE SODIUM PHOSPHATE 4 MG/ML
INJECTION, SOLUTION INTRA-ARTICULAR; INTRALESIONAL; INTRAMUSCULAR; INTRAVENOUS; SOFT TISSUE
Status: DISCONTINUED | OUTPATIENT
Start: 2025-04-23 | End: 2025-04-23

## 2025-04-23 RX ORDER — CELECOXIB 200 MG/1
200 CAPSULE ORAL
Status: CANCELLED | OUTPATIENT
Start: 2025-04-23 | End: 2025-04-23

## 2025-04-23 RX ADMIN — SODIUM CHLORIDE, POTASSIUM CHLORIDE, SODIUM LACTATE AND CALCIUM CHLORIDE: 600; 310; 30; 20 INJECTION, SOLUTION INTRAVENOUS at 02:04

## 2025-04-23 RX ADMIN — FENTANYL CITRATE 25 MCG: 50 INJECTION, SOLUTION INTRAMUSCULAR; INTRAVENOUS at 12:04

## 2025-04-23 RX ADMIN — ROCURONIUM BROMIDE 50 MG: 10 INJECTION, SOLUTION INTRAVENOUS at 11:04

## 2025-04-23 RX ADMIN — SODIUM CHLORIDE, POTASSIUM CHLORIDE, SODIUM LACTATE AND CALCIUM CHLORIDE: 600; 310; 30; 20 INJECTION, SOLUTION INTRAVENOUS at 11:04

## 2025-04-23 RX ADMIN — ONDANSETRON 4 MG: 2 INJECTION INTRAMUSCULAR; INTRAVENOUS at 06:04

## 2025-04-23 RX ADMIN — HYDROMORPHONE HYDROCHLORIDE 0.4 MG: 2 INJECTION INTRAMUSCULAR; INTRAVENOUS; SUBCUTANEOUS at 04:04

## 2025-04-23 RX ADMIN — ONDANSETRON 4 MG: 2 INJECTION INTRAMUSCULAR; INTRAVENOUS at 12:04

## 2025-04-23 RX ADMIN — PHENYLEPHRINE HYDROCHLORIDE 25 MCG: 10 INJECTION INTRAVENOUS at 12:04

## 2025-04-23 RX ADMIN — KETOROLAC TROMETHAMINE 15 MG: 30 INJECTION, SOLUTION INTRAMUSCULAR; INTRAVENOUS at 05:04

## 2025-04-23 RX ADMIN — PHENYLEPHRINE HYDROCHLORIDE 100 MCG: 10 INJECTION INTRAVENOUS at 12:04

## 2025-04-23 RX ADMIN — HYDROCODONE BITARTRATE AND ACETAMINOPHEN 1 TABLET: 7.5; 325 TABLET ORAL at 08:04

## 2025-04-23 RX ADMIN — METHOCARBAMOL 1000 MG: 100 INJECTION INTRAMUSCULAR; INTRAVENOUS at 03:04

## 2025-04-23 RX ADMIN — KETOROLAC TROMETHAMINE 30 MG: 30 INJECTION, SOLUTION INTRAMUSCULAR at 01:04

## 2025-04-23 RX ADMIN — DEXAMETHASONE SODIUM PHOSPHATE 4 MG: 4 INJECTION, SOLUTION INTRA-ARTICULAR; INTRALESIONAL; INTRAMUSCULAR; INTRAVENOUS; SOFT TISSUE at 12:04

## 2025-04-23 RX ADMIN — HYDROMORPHONE HYDROCHLORIDE 0.4 MG: 2 INJECTION INTRAMUSCULAR; INTRAVENOUS; SUBCUTANEOUS at 03:04

## 2025-04-23 RX ADMIN — LABETALOL HYDROCHLORIDE 5 MG: 5 INJECTION, SOLUTION INTRAVENOUS at 03:04

## 2025-04-23 RX ADMIN — PROPOFOL 170 MG: 10 INJECTION, EMULSION INTRAVENOUS at 11:04

## 2025-04-23 RX ADMIN — FENTANYL CITRATE 25 MCG: 50 INJECTION, SOLUTION INTRAMUSCULAR; INTRAVENOUS at 01:04

## 2025-04-23 RX ADMIN — FENTANYL CITRATE 50 MCG: 50 INJECTION, SOLUTION INTRAMUSCULAR; INTRAVENOUS at 11:04

## 2025-04-23 RX ADMIN — LEVOFLOXACIN 500 MG: 5 INJECTION, SOLUTION INTRAVENOUS at 11:04

## 2025-04-23 RX ADMIN — SUGAMMADEX 200 MG: 100 INJECTION, SOLUTION INTRAVENOUS at 02:04

## 2025-04-23 RX ADMIN — LIDOCAINE HYDROCHLORIDE 50 MG: 10 INJECTION, SOLUTION EPIDURAL; INFILTRATION; INTRACAUDAL; PERINEURAL at 11:04

## 2025-04-23 RX ADMIN — HEPARIN SODIUM 5000 UNITS: 5000 INJECTION, SOLUTION INTRAVENOUS; SUBCUTANEOUS at 11:04

## 2025-04-23 RX ADMIN — MIDAZOLAM HYDROCHLORIDE 1 MG: 1 INJECTION, SOLUTION INTRAMUSCULAR; INTRAVENOUS at 11:04

## 2025-04-23 RX ADMIN — FENTANYL CITRATE 25 MCG: 50 INJECTION, SOLUTION INTRAMUSCULAR; INTRAVENOUS at 02:04

## 2025-04-23 RX ADMIN — ROCURONIUM BROMIDE 10 MG: 10 INJECTION, SOLUTION INTRAVENOUS at 12:04

## 2025-04-23 RX ADMIN — PROPOFOL 50 MG: 10 INJECTION, EMULSION INTRAVENOUS at 12:04

## 2025-04-23 RX ADMIN — HYOSCYAMINE SULFATE 0.12 MG: 0.12 TABLET SUBLINGUAL at 08:04

## 2025-04-23 RX ADMIN — ACETAMINOPHEN 1000 MG: 10 INJECTION, SOLUTION INTRAVENOUS at 12:04

## 2025-04-23 RX ADMIN — FENTANYL CITRATE 25 MCG: 50 INJECTION, SOLUTION INTRAMUSCULAR; INTRAVENOUS at 03:04

## 2025-04-23 RX ADMIN — LABETALOL HYDROCHLORIDE 5 MG: 5 INJECTION, SOLUTION INTRAVENOUS at 12:04

## 2025-04-23 RX ADMIN — PHENYLEPHRINE HYDROCHLORIDE 50 MCG: 10 INJECTION INTRAVENOUS at 12:04

## 2025-04-23 NOTE — PROGRESS NOTES
Addended by: CELESTE KANG on: 2/23/2024 03:54 PM     Modules accepted: Orders     Pt Hx and procedure discussed in detail. Post op orders reviewed. Pt attached to v/s machine and vitals assessed. Procedure site, KEESHA drain, blair, and IV sites assessed and intact. Pre-op symptoms compared to post op symptoms. Family at bedside. Everyone understands pt info with no further questions.

## 2025-04-23 NOTE — ANESTHESIA PROCEDURE NOTES
Intubation    Date/Time: 4/23/2025 11:54 AM    Performed by: May Tovar CRNA  Authorized by: Campos Qureshi Jr., MD    Intubation:     Induction:  Intravenous    Intubated:  Postinduction    Mask Ventilation:  Easy mask    Attempts:  1    Attempted By:  CRNA    Method of Intubation:  Direct    Blade:  Mendez 2    Laryngeal View Grade: Grade IIA - cords partially seen      Difficult Airway Encountered?: No      Complications:  None    Airway Device:  Oral endotracheal tube    Airway Device Size:  7.0    Style/Cuff Inflation:  Cuffed (inflated to minimal occlusive pressure)    Inflation Amount (mL):  6    Tube secured:  22    Secured at:  The lips    Placement Verified By:  Capnometry    Complicating Factors:  None    Findings Post-Intubation:  BS equal bilateral and atraumatic/condition of teeth unchanged

## 2025-04-23 NOTE — ANESTHESIA POSTPROCEDURE EVALUATION
Anesthesia Post Evaluation    Patient: Tashi Deluna    Procedure(s) Performed: Procedure(s) (LRB):  OPEN INCISIONAL HERNIA REPAIR WITH COMPONENT SEPARATION WITH PLACEMENT OF 20 X 30 CM PARIETEX MESH (N/A)  LAPAROSCOPY, DIAGNOSTIC (N/A)  REPAIR, HERNIA, INGUINAL, LAPAROSCOPIC (Left)    Final Anesthesia Type: general      Patient location during evaluation: PACU  Patient participation: No - Unable to Participate, Sedation  Level of consciousness: sedated  Post-procedure vital signs: reviewed and stable  Pain management: adequate  Airway patency: patent  MINNIE mitigation strategies: Multimodal analgesia, Verification of full reversal of neuromuscular block and Postoperative administration of CPAP, nasopharyngeal airway, or oral appliance in the postanesthesia care unit (PACU)  PONV status at discharge: No PONV  Anesthetic complications: no      Cardiovascular status: blood pressure returned to baseline and stable  Respiratory status: unassisted, spontaneous ventilation and nasal cannula  Hydration status: euvolemic  Follow-up not needed.              Vitals Value Taken Time   /113 04/23/25 15:25   Temp 36.4 04/23/25 15:28   Pulse 85 04/23/25 15:28   Resp 12 04/23/25 15:28   SpO2 100 % 04/23/25 15:28   Vitals shown include unfiled device data.      No case tracking events are documented in the log.      Pain/Davis Score: Davis Score: 3 (4/23/2025  3:21 PM)

## 2025-04-23 NOTE — PLAN OF CARE
Problem: Comorbidity Management  Goal: Maintenance of Heart Failure Symptom Control  Outcome: Progressing     Problem: Fall Injury Risk  Goal: Absence of Fall and Fall-Related Injury  Outcome: Progressing     Problem: Infection  Goal: Absence of Infection Signs and Symptoms  Outcome: Progressing     Problem: Pain Acute  Goal: Optimal Pain Control and Function  Outcome: Progressing     Problem: Diabetes Comorbidity  Goal: Blood Glucose Level Within Targeted Range  Outcome: Progressing     Problem: Adult Inpatient Plan of Care  Goal: Plan of Care Review  Outcome: Progressing  Goal: Patient-Specific Goal (Individualized)  Outcome: Progressing  Goal: Absence of Hospital-Acquired Illness or Injury  Outcome: Progressing  Goal: Optimal Comfort and Wellbeing  Outcome: Progressing  Goal: Readiness for Transition of Care  Outcome: Progressing     Problem: Wound  Goal: Optimal Coping  Outcome: Progressing  Goal: Optimal Functional Ability  Outcome: Progressing  Goal: Absence of Infection Signs and Symptoms  Outcome: Progressing  Goal: Improved Oral Intake  Outcome: Progressing  Goal: Optimal Pain Control and Function  Outcome: Progressing  Goal: Skin Health and Integrity  Outcome: Progressing  Goal: Optimal Wound Healing  Outcome: Progressing

## 2025-04-23 NOTE — BRIEF OP NOTE
[facility] Surgical - Periop Services  Brief Operative Note    SUMMARY     Surgery Date: 4/23/2025     Surgeons and Role:     * Jean Tierney MD - Primary    Assisting : MARSHA Ramos      Pre-op Diagnosis:  Left inguinal hernia [K40.90]  Incisional hernia of anterior abdominal wall without obstruction or gangrene [K43.2]    Post-op Diagnosis:  Post-Op Diagnosis Codes:     * Left inguinal hernia [K40.90]     * Incisional hernia of anterior abdominal wall without obstruction or gangrene [K43.2]    Procedure(s) (LRB):  OPEN INCISIONAL HERNIA REPAIR WITH COMPONENT SEPARATION WITH PLACEMENT OF 20 X 30 CM PARIETEX MESH (N/A)  LAPAROSCOPY, DIAGNOSTIC (N/A)  REPAIR, HERNIA, INGUINAL, LAPAROSCOPIC (Left)    Anesthesia: General    Implants:  Implant Name Type Inv. Item Serial No.  Lot No. LRB No. Used Action   MESH POLY 3DMAX LG LEFT - [id number]  MESH POLY 3DMAX LG LEFT  [employer] [id number] Left 1 Implanted   MESH PARIETEX RECT 30 X 20CM - [id number]  MESH PARIETEX RECT 30 X 20CM  [employer] [id number] N/A 1 Implanted       Operative Findings: dictated per surgeon    Estimated Blood Loss: 75 mL    Estimated Blood Loss has been documented.         Specimens:   Specimen (24h ago, onward)       Start     Ordered    04/23/25 1353  Specimen to Pathology, Dermatology  RELEASE UPON ORDERING        References:    Click here for ordering Quick Tip   Question Answer Comment   Procedure Type: Dermatology and skin neoplasms    Release to patient Immediate        04/23/25 1353    04/23/25 1353  Specimen to Pathology  RELEASE UPON ORDERING        References:    Click here for ordering Quick Tip   Question:  Release to patient  Answer:  Immediate    04/23/25 1353                  ID Type Source Tests Collected by Time Destination   A : not umbilical, incisional hernia Tissue Umbilical Hernia  SPECIMEN TO PATHOLOGY Jean Tierney MD 4/23/2025 1333    B : abdominal skin Skin Skin SPECIMEN TO PATHOLOGY,  DERMATOLOGY Jean Tierney MD 4/23/2025 1349        [id number]

## 2025-04-23 NOTE — ANESTHESIA PREPROCEDURE EVALUATION
"                                                                                                             04/23/2025  Tashi Deluna is a 65 y.o., male with AFib status post pacemaker and ablation presents as an outpatient for diagnostic laparoscopy with incisional and left inguinal hernia repair.    Transthoracic echo 2022   EF 48% with grade 2 diastolic dysfunction   Mild MR/TR  PA systolic pressure 14  Last 3 sets of Vitals        2/20/2025     9:46 AM 4/21/2025     9:57 AM 4/23/2025    10:53 AM   Vitals - 1 value per visit   SYSTOLIC 159  157   DIASTOLIC 92  95   Pulse 86  85   Temp   36.7 °C (98.1 °F)   Resp   18   SPO2   99 %   Weight (lb) 161 160    Weight (kg) 73.029 72.576    Height 5' 9" (1.753 m) 5' 9" (1.753 m)    BMI (Calculated) 23.8 23.6          Lab Results   Component Value Date    WBC 3.77 (L) 04/14/2025    HGB 13.6 (L) 04/14/2025    HCT 39.3 (L) 04/14/2025    MCV 84.3 04/14/2025     04/14/2025            Chemistry        Component Value Date/Time     04/14/2025 1254    K 3.9 04/14/2025 1254     04/14/2025 1254    CO2 30 04/14/2025 1254    BUN 7.5 (L) 04/14/2025 1254    CREATININE 0.80 04/14/2025 1254        Component Value Date/Time    CALCIUM 8.4 (L) 04/14/2025 1254    ALKPHOS 80 04/14/2025 1254    AST 17 04/14/2025 1254    ALT 15 04/14/2025 1254    BILITOT 2.7 (H) 04/14/2025 1254    EGFRNONAA >60 05/19/2022 1851        Pre-op Assessment    I have reviewed the Patient Summary Reports.    I have reviewed the NPO Status.   I have reviewed the Medications.     Review of Systems  Anesthesia Hx:               Denies Personal Hx of Anesthesia complications.                    Social:  Non-Smoker       Hematology/Oncology:       -- Anemia:                                  Cardiovascular:         Dysrhythmias atrial fibrillation             Functional Capacity 3 METS                         Hepatic/GI:     GERD, well controlled                Endocrine:  Diabetes, type 1, using " insulin    Diabetes, Type 2 Diabetes  , Complications include Diabetic Neuropathy                        Physical Exam  General: Well nourished, Cooperative, Alert and Oriented    Airway:  Mallampati: II   Mouth Opening: Normal  TM Distance: Normal  Tongue: Normal  Neck ROM: Normal ROM    Dental:  Intact    Chest/Lungs:  Clear to auscultation, Normal Respiratory Rate    Heart:  Rate: Normal  Rhythm: Regular Rhythm        Anesthesia Plan  Type of Anesthesia, risks & benefits discussed:    Anesthesia Type: Gen ETT  Intra-op Monitoring Plan: Standard ASA Monitors  Post Op Pain Control Plan: multimodal analgesia and IV/PO Opioids PRN  Induction:  IV  Airway Plan: Direct  Informed Consent: Informed consent signed with the Patient and all parties understand the risks and agree with anesthesia plan.  All questions answered.   ASA Score: 3  Day of Surgery Review of History & Physical: H&P Update referred to the surgeon/provider.    Ready For Surgery From Anesthesia Perspective.     .

## 2025-04-23 NOTE — H&P
[facility] Surgical - Periop Services  General Surgery  History & Physical    Patient Name: Tashi Deluna  MRN: [id number]  Admission Date: 4/23/25  Attending Physician: Jean Tierney MD   Primary Care Provider: Guevara Torres MD    Patient information was obtained from patient and past medical records.     Subjective:     Chief Complaint/Reason for Admission:Incisional hernia, Left Inguinal hernia      History of Present Illness:  Mr. Tashi Deluna is a 65 y.o. male who presents to clinic for surgical repair of abdominal ventral hernia and left inguinal hernia.   He started noticing a bulge to abdominal wall.  It occasionally causes some pain and discomfort.   Denies any changes to bowel / bladder habits. He denies CP/SOB or fever/chills.      Positive symptoms:   + abdominal bulge to midline, + left groin bulge     S/p evacuation of right scrotal post op hematoma, seroma 7/29/2024 per Dr. Tierney.      S/p Diagnostic laparoscopy, Laparoscopic reduction of right inguinal hernia (large, incarcerated), and Laparoscopic right inguinal hernia repair with mesh TEPP 2/26/24 per Dr. Tierney. Incisional hernia was deferred at this time due to complexity of right inguinal hernia repair.      Patient Care Team:  Guevara Torres MD as PCP - General (Family Medicine)  Jean Tierney MD as Surgeon (General Surgery)  Kin Hubbard MD as Consulting Physician (Cardiology)  Jus Douglass MD (Endocrinology)  Kyler Mckeon MD (Gastroenterology)    Current Facility-Administered Medications on File Prior to Encounter   Medication    lactated ringers infusion     Current Outpatient Medications on File Prior to Encounter   Medication Sig    ENTRESTO 24-26 mg per tablet Take 1 tablet by mouth 2 (two) times daily.    ergocalciferol, vitamin D2, (VITAMIN D ORAL) Take by mouth.    insulin regular 100 unit/mL Inj injection Inject into the skin 3 (three) times daily before meals. Per sliding scale     LANTUS U-100 INSULIN 100 unit/mL injection Inject 10 Units into the skin Daily. Per sliding scale    metoprolol succinate (TOPROL-XL) 100 MG 24 hr tablet Take 100 mg by mouth once daily.    pantoprazole (PROTONIX) 40 MG tablet Take 40 mg by mouth once daily.    rosuvastatin (CRESTOR) 20 MG tablet Take 20 mg by mouth every evening.    vitamin D (VITAMIN D3) 1000 units Tab Take 1,000 Units by mouth once daily.    amiodarone (PACERONE) 400 MG tablet Take 0.5 tablets (200 mg total) by mouth 2 (two) times daily. Amiodarone 200mg tablets 400mg twice daily x 3 days then 200mg twice daily x 3 days then 200mg daily    cyanocobalamin, vitamin B-12, (VITAMIN B-12 ORAL) Take by mouth.    diphenhydrAMINE (BENADRYL ALLERGY) 25 mg tablet Take 25 mg by mouth daily as needed for Allergies.    ELIQUIS 5 mg Tab Take 5 mg by mouth 2 (two) times daily.    famotidine (PEPCID) 10 MG tablet Take 10 mg by mouth 2 (two) times daily.    magnesium oxide 500 mg Tab Take 1 tablet by mouth once daily.       Review of patient's allergies indicates:   Allergen Reactions    Amoxicillin Rash       Past Medical History:   Diagnosis Date    Atrial fibrillation     CHF (congestive heart failure)     Dysphagia     Fatigue     GERD (gastroesophageal reflux disease)     History of acute pancreatitis     History of anxiety     Incisional hernia of anterior abdominal wall without obstruction or gangrene     Inguinal hernia, bilateral     Neuropathy, diabetic     SOB (shortness of breath)     Type 1 diabetes mellitus     Weakness      Past Surgical History:   Procedure Laterality Date    ABLATION N/A 07/05/2022    Procedure: ABLATION;  Surgeon: Kin Hubbard MD;  Location: [facility] CATH LAB;  Service: Cardiology;  Laterality: N/A;  AV NODE ABLATION W/ ANEST.    CARDIAC ELECTROPHYSIOLOGY STUDY AND ABLATION      CARDIOVERSION      COLONOSCOPY  2021    EGD, WITH FOREIGN BODY REMOVAL N/A 12/11/2024    Procedure: EGD, WITH FOREIGN BODY REMOVAL;  Surgeon: Samuel De La Garza  MD BEATRICE;  Location: [facility] OR;  Service: Gastroenterology;  Laterality: N/A;    EVACUATION, HEMATOMA, INGUINAL REGION Right 2024    Procedure: EVACUATION, HEMATOMA, INGUINAL REGION  //right  / Evacuation of right groin/scrotal hematoma/seroma;  Surgeon: Jean Aguiar MD;  Location: [facility] OR;  Service: General;  Laterality: Right;  Evacuation of right groin/scrotal hematoma/seroma    Gastric Ulcer Sx      Dr. aguiar    HERNIA REPAIR Right     Open (6 years old)    INSERTION OF PACEMAKER  2022    REPAIR, HERNIA, INCISIONAL  2024    Procedure: REPAIR, HERNIA, INCISIONAL;  Surgeon: Jean Aguiar MD;  Location: [facility] OR;  Service: General;;  Open abdominal incisional hernia repair with mesh.    REPAIR, HERNIA, INGUINAL Right 2024    Procedure: REPAIR, HERNIA, INGUINAL;  Surgeon: Jean Aguiar MD;  Location: [facility] OR;  Service: General;  Laterality: Right;  Open right inguinal hernia repair with mesh.     Family History       Problem Relation (Age of Onset)    ALS Mother    Cancer Father          Tobacco Use    Smoking status: Former     Current packs/day: 0.00     Types: Cigarettes     Quit date:      Years since quittin.3    Smokeless tobacco: Never   Substance and Sexual Activity    Alcohol use: Never    Drug use: Never    Sexual activity: Not on file     Review of Systems   Constitutional: Negative.    HENT: Negative.     Eyes: Negative.    Respiratory: Negative.     Cardiovascular: Negative.    Gastrointestinal: Negative.         See HPI for positive symptoms   Endocrine: Negative.    Genitourinary: Negative.    Musculoskeletal: Negative.    Skin: Negative.    Allergic/Immunologic: Negative.    Neurological: Negative.    Psychiatric/Behavioral: Negative.     All other systems reviewed and are negative.    Objective:     Vital Signs (Most Recent):    Vital Signs (24h Range):        Weight: 72.6 kg (160 lb)  Body mass index is 23.63 kg/m².    Physical Exam  Vitals reviewed.    Constitutional:       General: He is not in acute distress.     Appearance: Normal appearance.   HENT:      Head: Normocephalic.   Eyes:      Conjunctiva/sclera: Conjunctivae normal.      Pupils: Pupils are equal, round, and reactive to light.   Cardiovascular:      Rate and Rhythm: Normal rate and regular rhythm.      Pulses: Normal pulses.      Heart sounds: Normal heart sounds.   Pulmonary:      Effort: Pulmonary effort is normal. No respiratory distress.      Breath sounds: Normal breath sounds.   Abdominal:      General: Bowel sounds are normal.      Palpations: Abdomen is soft.      Tenderness: There is no abdominal tenderness.      Hernia: A hernia is present.      Comments: Midline reducible incisional hernia, reducible left inguinal hernia.    Musculoskeletal:         General: Normal range of motion.      Cervical back: Neck supple.   Skin:     General: Skin is warm and dry.   Neurological:      General: No focal deficit present.      Mental Status: He is alert and oriented to person, place, and time.   Psychiatric:         Mood and Affect: Mood normal.         Behavior: Behavior normal.         Significant Labs:  I have reviewed all pertinent lab results within the past 24 hours.    Significant Diagnostics:  I have reviewed all pertinent imaging results/findings within the past 24 hours.    Assessment/Plan:     Active Diagnoses:    Diagnosis Date Noted POA    PRINCIPAL PROBLEM:  Incisional hernia of anterior abdominal wall without obstruction or gangrene [K43.2] 12/05/2023 Yes    Left inguinal hernia [K40.90] 01/04/2024 Yes      Problems Resolved During this Admission:     VTE Risk Mitigation (From admission, onward)      None        Plan:  Open incisional hernia repair with component separation, open left inguinal hernia repair with open [place] approach. Could also consider laparoscopic approach, operative findings to dictate procedure performed.      Dr. Tierney examined patient, marked  operative site, discussed recommendations, obtained informed consent, and answered all questions with patient/family.      Counseling included differential diagnoses, treatment options, risks and benefits, lifestyle changes, and prognosis.  The patient was interactive, and verbalized understanding. Patient was able to ask questions and wishes to proceed.      I, MARSHA Bowen, am scribing for, and in the presence of, Jean Tierney MD, performed the above scribed service and the documentation accurately describes the services I performed. I attest to the accuracy of the note.    MARSHA Ramos  General Surgery  [facility] Surgical - Periop Services

## 2025-04-24 LAB
ALBUMIN SERPL-MCNC: 3.3 G/DL (ref 3.4–4.8)
ALBUMIN/GLOB SERPL: 1.2 RATIO (ref 1.1–2)
ALP SERPL-CCNC: 84 UNIT/L (ref 40–150)
ALT SERPL-CCNC: 13 UNIT/L (ref 0–55)
ANION GAP SERPL CALC-SCNC: 13 MEQ/L
AST SERPL-CCNC: 18 UNIT/L (ref 11–45)
BASOPHILS # BLD AUTO: 0.01 X10(3)/MCL
BASOPHILS NFR BLD AUTO: 0.1 %
BILIRUB SERPL-MCNC: 3.4 MG/DL
BUN SERPL-MCNC: 12.3 MG/DL (ref 8.4–25.7)
CALCIUM SERPL-MCNC: 8.3 MG/DL (ref 8.8–10)
CHLORIDE SERPL-SCNC: 104 MMOL/L (ref 98–107)
CO2 SERPL-SCNC: 20 MMOL/L (ref 23–31)
CREAT SERPL-MCNC: 0.85 MG/DL (ref 0.72–1.25)
CREAT/UREA NIT SERPL: 14
EOSINOPHIL # BLD AUTO: 0 X10(3)/MCL (ref 0–0.9)
EOSINOPHIL NFR BLD AUTO: 0 %
ERYTHROCYTE [DISTWIDTH] IN BLOOD BY AUTOMATED COUNT: 13.5 % (ref 11.5–17)
GFR SERPLBLD CREATININE-BSD FMLA CKD-EPI: >60 ML/MIN/1.73/M2
GLOBULIN SER-MCNC: 2.8 GM/DL (ref 2.4–3.5)
GLUCOSE SERPL-MCNC: 291 MG/DL (ref 82–115)
HCT VFR BLD AUTO: 38.4 % (ref 42–52)
HCT VFR BLD AUTO: 39.9 % (ref 42–52)
HGB BLD-MCNC: 12.6 G/DL (ref 14–18)
HGB BLD-MCNC: 13.2 G/DL (ref 14–18)
IMM GRANULOCYTES # BLD AUTO: 0.07 X10(3)/MCL (ref 0–0.04)
IMM GRANULOCYTES NFR BLD AUTO: 0.5 %
LYMPHOCYTES # BLD AUTO: 0.68 X10(3)/MCL (ref 0.6–4.6)
LYMPHOCYTES NFR BLD AUTO: 5 %
MCH RBC QN AUTO: 28.4 PG (ref 27–31)
MCHC RBC AUTO-ENTMCNC: 33.1 G/DL (ref 33–36)
MCV RBC AUTO: 86 FL (ref 80–94)
MONOCYTES # BLD AUTO: 0.76 X10(3)/MCL (ref 0.1–1.3)
MONOCYTES NFR BLD AUTO: 5.6 %
NEUTROPHILS # BLD AUTO: 12.03 X10(3)/MCL (ref 2.1–9.2)
NEUTROPHILS NFR BLD AUTO: 88.8 %
NRBC BLD AUTO-RTO: 0 %
PLATELET # BLD AUTO: 145 X10(3)/MCL (ref 130–400)
PMV BLD AUTO: 10.1 FL (ref 7.4–10.4)
POCT GLUCOSE: 149 MG/DL (ref 70–110)
POTASSIUM SERPL-SCNC: 4.6 MMOL/L (ref 3.5–5.1)
PROT SERPL-MCNC: 6.1 GM/DL (ref 5.8–7.6)
RBC # BLD AUTO: 4.64 X10(6)/MCL (ref 4.7–6.1)
SODIUM SERPL-SCNC: 137 MMOL/L (ref 136–145)
WBC # BLD AUTO: 13.55 X10(3)/MCL (ref 4.5–11.5)

## 2025-04-24 PROCEDURE — 85025 COMPLETE CBC W/AUTO DIFF WBC: CPT | Performed by: SURGERY

## 2025-04-24 PROCEDURE — 25000003 PHARM REV CODE 250: Performed by: NURSE PRACTITIONER

## 2025-04-24 PROCEDURE — 11000001 HC ACUTE MED/SURG PRIVATE ROOM

## 2025-04-24 PROCEDURE — 85014 HEMATOCRIT: CPT | Performed by: NURSE PRACTITIONER

## 2025-04-24 PROCEDURE — 63600175 PHARM REV CODE 636 W HCPCS: Performed by: NURSE PRACTITIONER

## 2025-04-24 PROCEDURE — 80053 COMPREHEN METABOLIC PANEL: CPT | Performed by: SURGERY

## 2025-04-24 PROCEDURE — 25000003 PHARM REV CODE 250: Performed by: SURGERY

## 2025-04-24 RX ORDER — GLUCAGON 1 MG
1 KIT INJECTION
Status: DISCONTINUED | OUTPATIENT
Start: 2025-04-24 | End: 2025-04-24

## 2025-04-24 RX ORDER — INSULIN ASPART 100 [IU]/ML
0-10 INJECTION, SOLUTION INTRAVENOUS; SUBCUTANEOUS EVERY 6 HOURS PRN
Status: DISCONTINUED | OUTPATIENT
Start: 2025-04-24 | End: 2025-04-24

## 2025-04-24 RX ORDER — ENOXAPARIN SODIUM 100 MG/ML
40 INJECTION SUBCUTANEOUS DAILY
Status: DISCONTINUED | OUTPATIENT
Start: 2025-04-25 | End: 2025-04-25

## 2025-04-24 RX ORDER — INSULIN ASPART 100 [IU]/ML
4 INJECTION, SOLUTION INTRAVENOUS; SUBCUTANEOUS ONCE
Status: COMPLETED | OUTPATIENT
Start: 2025-04-24 | End: 2025-04-24

## 2025-04-24 RX ORDER — SODIUM CHLORIDE 9 MG/ML
INJECTION, SOLUTION INTRAVENOUS CONTINUOUS
Status: DISCONTINUED | OUTPATIENT
Start: 2025-04-24 | End: 2025-05-01

## 2025-04-24 RX ORDER — INSULIN ASPART 100 [IU]/ML
0-10 INJECTION, SOLUTION INTRAVENOUS; SUBCUTANEOUS EVERY 6 HOURS PRN
Status: DISCONTINUED | OUTPATIENT
Start: 2025-04-24 | End: 2025-05-02 | Stop reason: HOSPADM

## 2025-04-24 RX ORDER — GLUCAGON 1 MG
1 KIT INJECTION
Status: DISCONTINUED | OUTPATIENT
Start: 2025-04-24 | End: 2025-05-02 | Stop reason: HOSPADM

## 2025-04-24 RX ADMIN — AMIODARONE HYDROCHLORIDE 200 MG: 200 TABLET ORAL at 08:04

## 2025-04-24 RX ADMIN — INSULIN ASPART 4 UNITS: 100 INJECTION, SOLUTION INTRAVENOUS; SUBCUTANEOUS at 09:04

## 2025-04-24 RX ADMIN — ACETAMINOPHEN 1000 MG: 10 INJECTION, SOLUTION INTRAVENOUS at 01:04

## 2025-04-24 RX ADMIN — LABETALOL HYDROCHLORIDE 10 MG: 5 INJECTION, SOLUTION INTRAVENOUS at 12:04

## 2025-04-24 RX ADMIN — HYDROCODONE BITARTRATE AND ACETAMINOPHEN 1 TABLET: 7.5; 325 TABLET ORAL at 08:04

## 2025-04-24 RX ADMIN — KETOROLAC TROMETHAMINE 15 MG: 30 INJECTION, SOLUTION INTRAMUSCULAR; INTRAVENOUS at 12:04

## 2025-04-24 RX ADMIN — SODIUM CHLORIDE: 9 INJECTION, SOLUTION INTRAVENOUS at 10:04

## 2025-04-24 RX ADMIN — ONDANSETRON 4 MG: 2 INJECTION INTRAMUSCULAR; INTRAVENOUS at 02:04

## 2025-04-24 RX ADMIN — ENOXAPARIN SODIUM 40 MG: 40 INJECTION SUBCUTANEOUS at 08:04

## 2025-04-24 RX ADMIN — SACUBITRIL AND VALSARTAN 1 TABLET: 24; 26 TABLET, FILM COATED ORAL at 08:04

## 2025-04-24 RX ADMIN — PANTOPRAZOLE SODIUM 40 MG: 40 TABLET, DELAYED RELEASE ORAL at 08:04

## 2025-04-24 RX ADMIN — ACETAMINOPHEN 1000 MG: 10 INJECTION, SOLUTION INTRAVENOUS at 06:04

## 2025-04-24 RX ADMIN — KETOROLAC TROMETHAMINE 15 MG: 30 INJECTION, SOLUTION INTRAMUSCULAR; INTRAVENOUS at 06:04

## 2025-04-24 RX ADMIN — DOCUSATE SODIUM 50 MG: 50 CAPSULE, LIQUID FILLED ORAL at 01:04

## 2025-04-24 RX ADMIN — KETOROLAC TROMETHAMINE 15 MG: 30 INJECTION, SOLUTION INTRAMUSCULAR; INTRAVENOUS at 11:04

## 2025-04-24 RX ADMIN — TRAMADOL HYDROCHLORIDE 50 MG: 50 TABLET, COATED ORAL at 04:04

## 2025-04-24 RX ADMIN — METOPROLOL SUCCINATE 100 MG: 50 TABLET, EXTENDED RELEASE ORAL at 08:04

## 2025-04-24 RX ADMIN — HYDROCODONE BITARTRATE AND ACETAMINOPHEN 1 TABLET: 7.5; 325 TABLET ORAL at 11:04

## 2025-04-24 RX ADMIN — HYDROCODONE BITARTRATE AND ACETAMINOPHEN 1 TABLET: 7.5; 325 TABLET ORAL at 12:04

## 2025-04-24 RX ADMIN — HYOSCYAMINE SULFATE 0.12 MG: 0.12 TABLET SUBLINGUAL at 04:04

## 2025-04-24 RX ADMIN — HYDROCODONE BITARTRATE AND ACETAMINOPHEN 1 TABLET: 7.5; 325 TABLET ORAL at 06:04

## 2025-04-24 NOTE — PLAN OF CARE
Spoke to patient per phone about home health. Patient requested that I speak to brother. Spoke to brother per phone. List of providers given. FOC obtained for [facility]. Referral sent via [employer].

## 2025-04-24 NOTE — PROGRESS NOTES
[facility] Surgical - Med Surg  General Surgery  Progress Note    Subjective:     POD# 1    Afebrile. T max x 24 hrs and T current 98.1. Intermittent HTN noted since surgery. Pulse normal 86. Sp02 above 95% on room air this morning.    Villatoro catheter removed this morning and patient due to void.    + belching. - flatus.   Occasional nausea overnight but no emesis.   Ambulating with assist to restroom and in hallway.   Performing IS.     Patient reports pain moderate but controlled with current pain meds. Pain located to midline incision and worse with movement.     KEESHA drain output 75 cc x 24 hrs.    LR changed overnight to NS due to hyperglycemia.   Insulin SS ordered overnight but not initiated. Patient remains with hyperglycemia this AM.     Post-Op Info:  Procedure(s) (LRB):  OPEN INCISIONAL HERNIA REPAIR WITH COMPONENT SEPARATION WITH PLACEMENT OF 20 X 30 CM PARIETEX MESH (N/A)  LAPAROSCOPY, DIAGNOSTIC (N/A)  REPAIR, HERNIA, INGUINAL, LAPAROSCOPIC (Left)   1 Day Post-Op      Medications:  Continuous Infusions:   0.9% NaCl   Intravenous Continuous        electrolyte-A   Intravenous Continuous        lactated ringers   Intravenous Continuous   Stopped at 04/23/25 1517     Scheduled Meds:   acetaminophen  1,000 mg Intravenous Q8H    amiodarone  200 mg Oral BID    docusate sodium  50 mg Oral Daily    enoxaparin  40 mg Subcutaneous Daily    ketorolac  15 mg Intravenous Q6H    metoprolol succinate  100 mg Oral Daily    mupirocin   Nasal BID    pantoprazole  40 mg Oral Daily    sacubitriL-valsartan  1 tablet Oral BID     PRN Meds:  Current Facility-Administered Medications:     cloNIDine 0.1 mg/24 hr td ptwk, 1 patch, Transdermal, Once PRN    dextrose 50%, 12.5 g, Intravenous, PRN    diphenhydrAMINE, 25 mg, Intravenous, Q6H PRN    glucagon (human recombinant), 1 mg, Intramuscular, PRN    hydrALAZINE, 10 mg, Intravenous, Q4H PRN    HYDROcodone-acetaminophen, 1 tablet, Oral, Q6H PRN    hyoscyamine, 0.125 mg,  Sublingual, Q4H PRN    insulin aspart U-100, 0-10 Units, Subcutaneous, Q6H PRN    labetalol, 10 mg, Intravenous, Q2H PRN    morphine, 2 mg, Intravenous, Q2H PRN    ondansetron, 4 mg, Intravenous, Q6H PRN    prochlorperazine, 5 mg, Intravenous, Q6H PRN    promethazine, 12.5 mg, Intramuscular, Q6H PRN    traMADoL, 50 mg, Oral, Q4H PRN     Objective:     Vital Signs (Most Recent):  Temp: 98.1 °F (36.7 °C) (04/24/25 0439)  Pulse: 86 (04/24/25 0924)  Resp: 18 (04/24/25 0625)  BP: 126/81 (04/24/25 0924)  SpO2: 96 % (04/24/25 0924) Vital Signs (24h Range):  Temp:  [97.5 °F (36.4 °C)-98.1 °F (36.7 °C)] 98.1 °F (36.7 °C)  Pulse:  [83-90] 86  Resp:  [11-18] 18  SpO2:  [86 %-100 %] 96 %  BP: (126-179)/() 126/81       Intake/Output Summary (Last 24 hours) at 4/24/2025 0942  Last data filed at 4/24/2025 0500  Gross per 24 hour   Intake 1400 ml   Output 1510 ml   Net -110 ml       Physical Exam  Constitutional:       General: He is not in acute distress.  HENT:      Head: Normocephalic.   Cardiovascular:      Rate and Rhythm: Normal rate and regular rhythm.   Pulmonary:      Effort: Pulmonary effort is normal.      Breath sounds: Normal breath sounds.   Abdominal:      Comments: Surgical dressing taken down during AM rounds. Midline incision with staples, moderate size hematoma noted to subcutaneous area especially to superior right paramedian aspect of incision. + hypoactive BS present. + appropriate incisional tenderness, no guarding or peritoneal signs appreciated. Scant bloody oozing to distal aspect of midline incision and surrounding KEESHA drain. Surgical dressing changed with sterile 4 x 4 gauze, abd pads, and paper tape. Abdominal binder placed.    Neurological:      General: No focal deficit present.      Mental Status: He is alert and oriented to person, place, and time.   Psychiatric:         Mood and Affect: Mood normal.         Behavior: Behavior normal.         Judgment: Judgment normal.         Significant  Labs:  CBC:   Recent Labs   Lab 04/24/25  0454   WBC 13.55*   RBC 4.64*   HGB 13.2*   HCT 39.9*      MCV 86.0   MCH 28.4   MCHC 33.1     CMP:   Recent Labs   Lab 04/24/25  0454   CALCIUM 8.3*   ALBUMIN 3.3*      K 4.6   CO2 20*      BUN 12.3   CREATININE 0.85   ALKPHOS 84   ALT 13   AST 18   BILITOT 3.4*       Significant Diagnostics:  None    Assessment/Plan:     Active Diagnoses:    Diagnosis Date Noted POA    PRINCIPAL PROBLEM:  Incisional hernia of anterior abdominal wall without obstruction or gangrene [K43.2] 12/05/2023 Yes    Left inguinal hernia [K40.90] 01/04/2024 Yes      Problems Resolved During this Admission:     POD# 1    - Await bowel function to return. Ok for sips of water and ice chips today. Discussed with patient he is a high risk of developing post op ileus. Advance diet slowly.    - Resume home meds for HTN, etc. Hold home med of Lantus while NPO. Order insulin sliding scale for NPO patients. Discussed with pharmacist this AM and she will attempt to place clear dosing parameters for Novolog dosing for nursing staff. Order 1 time dose of Novolog 4 units now for CBG over 300.     - Replace IVF with NS due to hyperglycemia. DC LR. Order NS @ 100 cc/hr    - Start Colace    - Continue ambulation in hallway with assist and IS.    - Recheck labs in AM    - Consult case mgmt for discharge planning needs and post op home health evaluation.       Ana María Rogel, ANP  General Surgery  [facility] Surgical - Med Surg

## 2025-04-24 NOTE — PROGRESS NOTES
General surgery interim note:    POD# 1    Short term recheck H/H at noon stable, 13.2/39.9 @ 0500, recheck 12.6/38.4.    Recheck labs tomorrow AM.     Will continue daily Lovenox 40 mg daily for DVT prophylaxis and history of A fib on Eliquis at home (on hold for surgery currently).    Case mgmt preparing home health services for DC planning.    Will continue to monitor closely.    Rosaura MARRUFO/Dr. Tierney

## 2025-04-24 NOTE — NURSING
Pt O2 sat 92 on room air, O2 NC applied, sats elevated to 96.  Assited Pt back to bed. B/P stable. Pt denies SOB.  Denies further complaint.

## 2025-04-25 PROBLEM — D62 ACUTE BLOOD LOSS ANEMIA: Status: ACTIVE | Noted: 2025-04-25

## 2025-04-25 PROBLEM — L76.32 POSTOPERATIVE HEMATOMA OF SUBCUTANEOUS TISSUE FOLLOWING NON-DERMATOLOGIC PROCEDURE: Status: ACTIVE | Noted: 2025-04-25

## 2025-04-25 LAB
ABO + RH BLD: NORMAL
ABO + RH BLD: NORMAL
ABORH RETYPE: NORMAL
ALBUMIN SERPL-MCNC: 3.1 G/DL (ref 3.4–4.8)
ALBUMIN/GLOB SERPL: 1.2 RATIO (ref 1.1–2)
ALP SERPL-CCNC: 71 UNIT/L (ref 40–150)
ALT SERPL-CCNC: 11 UNIT/L (ref 0–55)
ANION GAP SERPL CALC-SCNC: 11 MEQ/L
AST SERPL-CCNC: 18 UNIT/L (ref 11–45)
BASOPHILS # BLD AUTO: 0.01 X10(3)/MCL
BASOPHILS NFR BLD AUTO: 0.1 %
BILIRUB SERPL-MCNC: 2.6 MG/DL
BLD PROD TYP BPU: NORMAL
BLD PROD TYP BPU: NORMAL
BLOOD UNIT EXPIRATION DATE: NORMAL
BLOOD UNIT EXPIRATION DATE: NORMAL
BLOOD UNIT TYPE CODE: 5100
BLOOD UNIT TYPE CODE: 5100
BUN SERPL-MCNC: 34.7 MG/DL (ref 8.4–25.7)
CALCIUM SERPL-MCNC: 7.6 MG/DL (ref 8.8–10)
CHLORIDE SERPL-SCNC: 104 MMOL/L (ref 98–107)
CO2 SERPL-SCNC: 22 MMOL/L (ref 23–31)
CREAT SERPL-MCNC: 1.17 MG/DL (ref 0.72–1.25)
CREAT/UREA NIT SERPL: 30
CROSSMATCH INTERPRETATION: NORMAL
CROSSMATCH INTERPRETATION: NORMAL
DISPENSE STATUS: NORMAL
DISPENSE STATUS: NORMAL
EOSINOPHIL # BLD AUTO: 0 X10(3)/MCL (ref 0–0.9)
EOSINOPHIL NFR BLD AUTO: 0 %
ERYTHROCYTE [DISTWIDTH] IN BLOOD BY AUTOMATED COUNT: 13.8 % (ref 11.5–17)
GFR SERPLBLD CREATININE-BSD FMLA CKD-EPI: >60 ML/MIN/1.73/M2
GLOBULIN SER-MCNC: 2.5 GM/DL (ref 2.4–3.5)
GLUCOSE SERPL-MCNC: 221 MG/DL (ref 82–115)
GROUP & RH: NORMAL
HCT VFR BLD AUTO: 30 % (ref 42–52)
HCT VFR BLD AUTO: 31 % (ref 42–52)
HGB BLD-MCNC: 10.2 G/DL (ref 14–18)
HGB BLD-MCNC: 9.8 G/DL (ref 14–18)
IMM GRANULOCYTES # BLD AUTO: 0.03 X10(3)/MCL (ref 0–0.04)
IMM GRANULOCYTES NFR BLD AUTO: 0.3 %
INDIRECT COOMBS: NORMAL
LYMPHOCYTES # BLD AUTO: 0.92 X10(3)/MCL (ref 0.6–4.6)
LYMPHOCYTES NFR BLD AUTO: 8.4 %
MCH RBC QN AUTO: 28.9 PG (ref 27–31)
MCHC RBC AUTO-ENTMCNC: 32.9 G/DL (ref 33–36)
MCV RBC AUTO: 87.8 FL (ref 80–94)
MONOCYTES # BLD AUTO: 0.5 X10(3)/MCL (ref 0.1–1.3)
MONOCYTES NFR BLD AUTO: 4.5 %
NEUTROPHILS # BLD AUTO: 9.53 X10(3)/MCL (ref 2.1–9.2)
NEUTROPHILS NFR BLD AUTO: 86.7 %
NRBC BLD AUTO-RTO: 0 %
PLATELET # BLD AUTO: 147 X10(3)/MCL (ref 130–400)
PMV BLD AUTO: 10.5 FL (ref 7.4–10.4)
POTASSIUM SERPL-SCNC: 5 MMOL/L (ref 3.5–5.1)
PROT SERPL-MCNC: 5.6 GM/DL (ref 5.8–7.6)
RBC # BLD AUTO: 3.53 X10(6)/MCL (ref 4.7–6.1)
SODIUM SERPL-SCNC: 137 MMOL/L (ref 136–145)
SPECIMEN OUTDATE: NORMAL
UNIT NUMBER: NORMAL
UNIT NUMBER: NORMAL
WBC # BLD AUTO: 10.99 X10(3)/MCL (ref 4.5–11.5)

## 2025-04-25 PROCEDURE — 85025 COMPLETE CBC W/AUTO DIFF WBC: CPT | Performed by: NURSE PRACTITIONER

## 2025-04-25 PROCEDURE — P9016 RBC LEUKOCYTES REDUCED: HCPCS | Performed by: SURGERY

## 2025-04-25 PROCEDURE — 80053 COMPREHEN METABOLIC PANEL: CPT | Performed by: NURSE PRACTITIONER

## 2025-04-25 PROCEDURE — 99233 SBSQ HOSP IP/OBS HIGH 50: CPT | Mod: ,,, | Performed by: SURGERY

## 2025-04-25 PROCEDURE — 86900 BLOOD TYPING SEROLOGIC ABO: CPT | Performed by: NURSE PRACTITIONER

## 2025-04-25 PROCEDURE — 36430 TRANSFUSION BLD/BLD COMPNT: CPT

## 2025-04-25 PROCEDURE — 11000001 HC ACUTE MED/SURG PRIVATE ROOM

## 2025-04-25 PROCEDURE — 85014 HEMATOCRIT: CPT | Performed by: NURSE PRACTITIONER

## 2025-04-25 PROCEDURE — 25000003 PHARM REV CODE 250: Performed by: SURGERY

## 2025-04-25 PROCEDURE — 25000003 PHARM REV CODE 250: Performed by: NURSE PRACTITIONER

## 2025-04-25 PROCEDURE — 86923 COMPATIBILITY TEST ELECTRIC: CPT | Mod: 91 | Performed by: SURGERY

## 2025-04-25 PROCEDURE — 63600175 PHARM REV CODE 636 W HCPCS: Performed by: NURSE PRACTITIONER

## 2025-04-25 RX ORDER — HYDROCODONE BITARTRATE AND ACETAMINOPHEN 500; 5 MG/1; MG/1
TABLET ORAL
Status: DISCONTINUED | OUTPATIENT
Start: 2025-04-25 | End: 2025-05-02 | Stop reason: HOSPADM

## 2025-04-25 RX ADMIN — HYDROCODONE BITARTRATE AND ACETAMINOPHEN 1 TABLET: 7.5; 325 TABLET ORAL at 04:04

## 2025-04-25 RX ADMIN — INSULIN ASPART 6 UNITS: 100 INJECTION, SOLUTION INTRAVENOUS; SUBCUTANEOUS at 02:04

## 2025-04-25 RX ADMIN — AMIODARONE HYDROCHLORIDE 200 MG: 200 TABLET ORAL at 09:04

## 2025-04-25 RX ADMIN — PANTOPRAZOLE SODIUM 40 MG: 40 TABLET, DELAYED RELEASE ORAL at 09:04

## 2025-04-25 RX ADMIN — MUPIROCIN: 20 OINTMENT TOPICAL at 09:04

## 2025-04-25 RX ADMIN — HYDROCODONE BITARTRATE AND ACETAMINOPHEN 1 TABLET: 7.5; 325 TABLET ORAL at 11:04

## 2025-04-25 RX ADMIN — PROCHLORPERAZINE EDISYLATE 5 MG: 5 INJECTION INTRAMUSCULAR; INTRAVENOUS at 12:04

## 2025-04-25 RX ADMIN — TRAMADOL HYDROCHLORIDE 50 MG: 50 TABLET, COATED ORAL at 09:04

## 2025-04-25 RX ADMIN — KETOROLAC TROMETHAMINE 15 MG: 30 INJECTION, SOLUTION INTRAMUSCULAR; INTRAVENOUS at 06:04

## 2025-04-25 RX ADMIN — HYDROCODONE BITARTRATE AND ACETAMINOPHEN 1 TABLET: 7.5; 325 TABLET ORAL at 05:04

## 2025-04-25 RX ADMIN — KETOROLAC TROMETHAMINE 15 MG: 30 INJECTION, SOLUTION INTRAMUSCULAR; INTRAVENOUS at 12:04

## 2025-04-25 RX ADMIN — METOPROLOL SUCCINATE 100 MG: 50 TABLET, EXTENDED RELEASE ORAL at 12:04

## 2025-04-25 RX ADMIN — SACUBITRIL AND VALSARTAN 1 TABLET: 24; 26 TABLET, FILM COATED ORAL at 09:04

## 2025-04-25 NOTE — PROGRESS NOTES
[facility] Surgical - Med Surg  General Surgery  Progress Note      Interval History:     POD# 2    Afebrile. T max x 24 hours 98.1. T current 97.7. HTN resolved since resuming home meds for HTN. /74 Pulse 86 SpO2 99% on room air.     Ambulating in hallway a few times per day with standby assist. No dizziness or weakness reported. Incisional pain mild and controlled with current meds. Worse with movement.    Tolerating sips and chips without NV.     + belching, + some flatus this AM.- BM. Voiding but UOP still sluggish overnight.    KEESHA output on nights reported at 20 cc bloody.     Home health referral in process with case mgmt for DC planning.     CBGs under better control overnight, less than 250 glucose on average. Previous day over 300.    NS@ 100 cc/hr.     Post-Op Info:  Procedure(s) (LRB):  OPEN INCISIONAL HERNIA REPAIR WITH COMPONENT SEPARATION WITH PLACEMENT OF 20 X 30 CM PARIETEX MESH (N/A)  LAPAROSCOPY, DIAGNOSTIC (N/A)  REPAIR, HERNIA, INGUINAL, LAPAROSCOPIC (Left)   2 Days Post-Op      Medications:  Continuous Infusions:   0.9% NaCl   Intravenous Continuous 100 mL/hr at 04/24/25 1024 New Bag at 04/24/25 1024    electrolyte-A   Intravenous Continuous        lactated ringers   Intravenous Continuous   Stopped at 04/23/25 1517     Scheduled Meds:   amiodarone  200 mg Oral BID    docusate sodium  50 mg Oral Daily    ketorolac  15 mg Intravenous Q6H    metoprolol succinate  100 mg Oral Daily    mupirocin   Nasal BID    pantoprazole  40 mg Oral Daily    sacubitriL-valsartan  1 tablet Oral BID     PRN Meds:  Current Facility-Administered Medications:     cloNIDine 0.1 mg/24 hr td ptwk, 1 patch, Transdermal, Once PRN    dextrose 50%, 12.5 g, Intravenous, PRN    diphenhydrAMINE, 25 mg, Intravenous, Q6H PRN    glucagon (human recombinant), 1 mg, Intramuscular, PRN    hydrALAZINE, 10 mg, Intravenous, Q4H PRN    HYDROcodone-acetaminophen, 1 tablet, Oral, Q6H PRN    hyoscyamine, 0.125 mg, Sublingual,  Q4H PRN    insulin aspart U-100, 0-10 Units, Subcutaneous, Q6H PRN    labetalol, 10 mg, Intravenous, Q2H PRN    morphine, 2 mg, Intravenous, Q2H PRN    ondansetron, 4 mg, Intravenous, Q6H PRN    prochlorperazine, 5 mg, Intravenous, Q6H PRN    promethazine, 12.5 mg, Intramuscular, Q6H PRN    traMADoL, 50 mg, Oral, Q4H PRN     Objective:     Vital Signs (Most Recent):  Temp: 97.7 °F (36.5 °C) (04/25/25 0414)  Pulse: 86 (04/25/25 0022)  Resp: 16 (04/25/25 0409)  BP: 114/74 (04/25/25 0414)  SpO2: 99 % (04/25/25 0022) Vital Signs (24h Range):  Temp:  [97.5 °F (36.4 °C)-98.1 °F (36.7 °C)] 97.7 °F (36.5 °C)  Pulse:  [80-90] 86  Resp:  [16-18] 16  SpO2:  [83 %-99 %] 99 %  BP: ()/(60-82) 114/74       Intake/Output Summary (Last 24 hours) at 4/25/2025 0836  Last data filed at 4/25/2025 0429  Gross per 24 hour   Intake 1600 ml   Output 395 ml   Net 1205 ml       Physical Exam  Vitals and nursing note reviewed.   Constitutional:       General: He is not in acute distress.     Appearance: Normal appearance.   Cardiovascular:      Rate and Rhythm: Normal rate and regular rhythm.   Pulmonary:      Effort: Pulmonary effort is normal.      Breath sounds: Normal breath sounds.   Abdominal:      Comments: Surgical dressing taken down during AM rounds. Midline incision with staples, moderate size hematoma noted to subcutaneous area especially to superior right paramedian aspect of incision, dependent gravity resulting in taut hematoma traveling down toward middle of laparotomy incision.  + hypoactive BS present. + appropriate incisional tenderness, no guarding or peritoneal signs appreciated. Scant bloody oozing to midline incision and surrounding KEESHA drain. Surgical dressing changed with sterile 4 x 4 gauze, abd pads, and paper tape. Abdominal binder placed.    Skin:     General: Skin is warm and dry.   Neurological:      General: No focal deficit present.      Mental Status: He is alert and oriented to person, place, and time.    Psychiatric:         Mood and Affect: Mood normal.         Behavior: Behavior normal.         Judgment: Judgment normal.         Significant Labs:  CBC:   Recent Labs   Lab 04/25/25  0426   WBC 10.99   RBC 3.53*   HGB 10.2*   HCT 31.0*      MCV 87.8   MCH 28.9   MCHC 32.9*     CMP:   Recent Labs   Lab 04/25/25  0426   CALCIUM 7.6*   ALBUMIN 3.1*      K 5.0   CO2 22*      BUN 34.7*   CREATININE 1.17   ALKPHOS 71   ALT 11   AST 18   BILITOT 2.6*       Significant Diagnostics:  None    Assessment/Plan:     Active Diagnoses:    Diagnosis Date Noted POA    PRINCIPAL PROBLEM:  Incisional hernia of anterior abdominal wall without obstruction or gangrene [K43.2] 12/05/2023 Yes    Postoperative hematoma of subcutaneous tissue following non-dermatologic procedure [L76.32] 04/25/2025 No    Acute blood loss anemia [D62] 04/25/2025 No    Left inguinal hernia [K40.90] 01/04/2024 Yes      Problems Resolved During this Admission:     POD# 2    - Hold AM Lovenox   - Recheck H/H at noon. Drop in H/H likely result of moderate subcutaneous hematoma. Hopefully holding Lovenox will allow this to stabilize. May require blood transfusion if H/H continues to drift down or patient becomes symptomatic. Will order type and screen today.  - Advance to sugar free clear liquids today as tolerated.  - Continue ambulation, IS, and SCDs for DVT prophylaxis.  - Continue insulin SS  - Dr. Tierney given morning report.     Ana María Rogel, ANP  General Surgery  [facility] - Med Surg   60

## 2025-04-26 LAB
ANION GAP SERPL CALC-SCNC: 10 MEQ/L
BASOPHILS # BLD AUTO: 0.02 X10(3)/MCL
BASOPHILS NFR BLD AUTO: 0.2 %
BUN SERPL-MCNC: 41.1 MG/DL (ref 8.4–25.7)
CALCIUM SERPL-MCNC: 7.6 MG/DL (ref 8.8–10)
CHLORIDE SERPL-SCNC: 107 MMOL/L (ref 98–107)
CO2 SERPL-SCNC: 19 MMOL/L (ref 23–31)
CREAT SERPL-MCNC: 1 MG/DL (ref 0.72–1.25)
CREAT/UREA NIT SERPL: 41
EOSINOPHIL # BLD AUTO: 0.01 X10(3)/MCL (ref 0–0.9)
EOSINOPHIL NFR BLD AUTO: 0.1 %
ERYTHROCYTE [DISTWIDTH] IN BLOOD BY AUTOMATED COUNT: 13.9 % (ref 11.5–17)
GFR SERPLBLD CREATININE-BSD FMLA CKD-EPI: >60 ML/MIN/1.73/M2
GLUCOSE SERPL-MCNC: 200 MG/DL (ref 82–115)
HCT VFR BLD AUTO: 32.4 % (ref 42–52)
HGB BLD-MCNC: 10.4 G/DL (ref 14–18)
IMM GRANULOCYTES # BLD AUTO: 0.04 X10(3)/MCL (ref 0–0.04)
IMM GRANULOCYTES NFR BLD AUTO: 0.4 %
LYMPHOCYTES # BLD AUTO: 1.62 X10(3)/MCL (ref 0.6–4.6)
LYMPHOCYTES NFR BLD AUTO: 15.8 %
MCH RBC QN AUTO: 28.3 PG (ref 27–31)
MCHC RBC AUTO-ENTMCNC: 32.1 G/DL (ref 33–36)
MCV RBC AUTO: 88.3 FL (ref 80–94)
MONOCYTES # BLD AUTO: 0.7 X10(3)/MCL (ref 0.1–1.3)
MONOCYTES NFR BLD AUTO: 6.8 %
NEUTROPHILS # BLD AUTO: 7.88 X10(3)/MCL (ref 2.1–9.2)
NEUTROPHILS NFR BLD AUTO: 76.7 %
NRBC BLD AUTO-RTO: 0 %
PLATELET # BLD AUTO: 129 X10(3)/MCL (ref 130–400)
PMV BLD AUTO: 10.3 FL (ref 7.4–10.4)
POCT GLUCOSE: 209 MG/DL (ref 70–110)
POTASSIUM SERPL-SCNC: 4.5 MMOL/L (ref 3.5–5.1)
RBC # BLD AUTO: 3.67 X10(6)/MCL (ref 4.7–6.1)
SODIUM SERPL-SCNC: 136 MMOL/L (ref 136–145)
WBC # BLD AUTO: 10.27 X10(3)/MCL (ref 4.5–11.5)

## 2025-04-26 PROCEDURE — 80048 BASIC METABOLIC PNL TOTAL CA: CPT | Performed by: SURGERY

## 2025-04-26 PROCEDURE — 63600175 PHARM REV CODE 636 W HCPCS: Mod: JZ,TB | Performed by: NURSE PRACTITIONER

## 2025-04-26 PROCEDURE — 11000001 HC ACUTE MED/SURG PRIVATE ROOM

## 2025-04-26 PROCEDURE — 85025 COMPLETE CBC W/AUTO DIFF WBC: CPT | Performed by: SURGERY

## 2025-04-26 PROCEDURE — 25000003 PHARM REV CODE 250: Performed by: NURSE PRACTITIONER

## 2025-04-26 PROCEDURE — 25000003 PHARM REV CODE 250: Performed by: SURGERY

## 2025-04-26 RX ADMIN — KETOROLAC TROMETHAMINE 15 MG: 30 INJECTION, SOLUTION INTRAMUSCULAR; INTRAVENOUS at 06:04

## 2025-04-26 RX ADMIN — METOPROLOL SUCCINATE 100 MG: 50 TABLET, EXTENDED RELEASE ORAL at 10:04

## 2025-04-26 RX ADMIN — AMIODARONE HYDROCHLORIDE 200 MG: 200 TABLET ORAL at 08:04

## 2025-04-26 RX ADMIN — MUPIROCIN: 20 OINTMENT TOPICAL at 09:04

## 2025-04-26 RX ADMIN — AMIODARONE HYDROCHLORIDE 200 MG: 200 TABLET ORAL at 09:04

## 2025-04-26 RX ADMIN — SACUBITRIL AND VALSARTAN 1 TABLET: 24; 26 TABLET, FILM COATED ORAL at 09:04

## 2025-04-26 RX ADMIN — HYDROCODONE BITARTRATE AND ACETAMINOPHEN 1 TABLET: 7.5; 325 TABLET ORAL at 08:04

## 2025-04-26 RX ADMIN — PANTOPRAZOLE SODIUM 40 MG: 40 TABLET, DELAYED RELEASE ORAL at 09:04

## 2025-04-26 RX ADMIN — MUPIROCIN: 20 OINTMENT TOPICAL at 08:04

## 2025-04-26 RX ADMIN — DOCUSATE SODIUM 50 MG: 50 CAPSULE, LIQUID FILLED ORAL at 09:04

## 2025-04-26 RX ADMIN — KETOROLAC TROMETHAMINE 15 MG: 30 INJECTION, SOLUTION INTRAMUSCULAR; INTRAVENOUS at 12:04

## 2025-04-26 RX ADMIN — TRAMADOL HYDROCHLORIDE 50 MG: 50 TABLET, COATED ORAL at 05:04

## 2025-04-26 RX ADMIN — SODIUM CHLORIDE: 9 INJECTION, SOLUTION INTRAVENOUS at 03:04

## 2025-04-26 RX ADMIN — SACUBITRIL AND VALSARTAN 1 TABLET: 24; 26 TABLET, FILM COATED ORAL at 08:04

## 2025-04-27 LAB — POCT GLUCOSE: 189 MG/DL (ref 70–110)

## 2025-04-27 PROCEDURE — 25000003 PHARM REV CODE 250: Performed by: SURGERY

## 2025-04-27 PROCEDURE — 63600175 PHARM REV CODE 636 W HCPCS: Performed by: NURSE PRACTITIONER

## 2025-04-27 PROCEDURE — 25000003 PHARM REV CODE 250: Performed by: NURSE PRACTITIONER

## 2025-04-27 PROCEDURE — 11000001 HC ACUTE MED/SURG PRIVATE ROOM

## 2025-04-27 PROCEDURE — 30233N1 TRANSFUSION OF NONAUTOLOGOUS RED BLOOD CELLS INTO PERIPHERAL VEIN, PERCUTANEOUS APPROACH: ICD-10-PCS | Performed by: SURGERY

## 2025-04-27 RX ADMIN — SACUBITRIL AND VALSARTAN 1 TABLET: 24; 26 TABLET, FILM COATED ORAL at 10:04

## 2025-04-27 RX ADMIN — AMIODARONE HYDROCHLORIDE 200 MG: 200 TABLET ORAL at 10:04

## 2025-04-27 RX ADMIN — HYDROCODONE BITARTRATE AND ACETAMINOPHEN 1 TABLET: 7.5; 325 TABLET ORAL at 10:04

## 2025-04-27 RX ADMIN — TRAMADOL HYDROCHLORIDE 50 MG: 50 TABLET, COATED ORAL at 11:04

## 2025-04-27 RX ADMIN — SACUBITRIL AND VALSARTAN 1 TABLET: 24; 26 TABLET, FILM COATED ORAL at 08:04

## 2025-04-27 RX ADMIN — MUPIROCIN: 20 OINTMENT TOPICAL at 09:04

## 2025-04-27 RX ADMIN — DIPHENHYDRAMINE HYDROCHLORIDE 25 MG: 50 INJECTION INTRAMUSCULAR; INTRAVENOUS at 02:04

## 2025-04-27 RX ADMIN — HYDROCODONE BITARTRATE AND ACETAMINOPHEN 1 TABLET: 7.5; 325 TABLET ORAL at 05:04

## 2025-04-27 RX ADMIN — AMIODARONE HYDROCHLORIDE 200 MG: 200 TABLET ORAL at 08:04

## 2025-04-27 RX ADMIN — DOCUSATE SODIUM 50 MG: 50 CAPSULE, LIQUID FILLED ORAL at 08:04

## 2025-04-27 RX ADMIN — METOPROLOL SUCCINATE 100 MG: 50 TABLET, EXTENDED RELEASE ORAL at 08:04

## 2025-04-27 RX ADMIN — PANTOPRAZOLE SODIUM 40 MG: 40 TABLET, DELAYED RELEASE ORAL at 08:04

## 2025-04-27 RX ADMIN — TRAMADOL HYDROCHLORIDE 50 MG: 50 TABLET, COATED ORAL at 02:04

## 2025-04-27 NOTE — PROGRESS NOTES
[facility] - 8th Floor Med Surg  General Surgery  Progress Note    Subjective:     Interval History: POD 4  AFVSS  HD stable      Post-Op Info:  Procedure(s) (LRB):  OPEN INCISIONAL HERNIA REPAIR WITH COMPONENT SEPARATION WITH PLACEMENT OF 20 X 30 CM PARIETEX MESH (N/A)  LAPAROSCOPY, DIAGNOSTIC (N/A)  REPAIR, HERNIA, INGUINAL, LAPAROSCOPIC (Left)   4 Days Post-Op      Medications:  Continuous Infusions:   0.9% NaCl   Intravenous Continuous 100 mL/hr at 04/26/25 1539 New Bag at 04/26/25 1539    lactated ringers   Intravenous Continuous   Stopped at 04/23/25 1517     Scheduled Meds:   amiodarone  200 mg Oral BID    docusate sodium  50 mg Oral Daily    metoprolol succinate  100 mg Oral Daily    mupirocin   Nasal BID    pantoprazole  40 mg Oral Daily    sacubitriL-valsartan  1 tablet Oral BID     PRN Meds:  Current Facility-Administered Medications:     0.9%  NaCl infusion (for blood administration), , Intravenous, Q24H PRN    cloNIDine 0.1 mg/24 hr td ptwk, 1 patch, Transdermal, Once PRN    dextrose 50%, 12.5 g, Intravenous, PRN    diphenhydrAMINE, 25 mg, Intravenous, Q6H PRN    glucagon (human recombinant), 1 mg, Intramuscular, PRN    hydrALAZINE, 10 mg, Intravenous, Q4H PRN    HYDROcodone-acetaminophen, 1 tablet, Oral, Q6H PRN    hyoscyamine, 0.125 mg, Sublingual, Q4H PRN    insulin aspart U-100, 0-10 Units, Subcutaneous, Q6H PRN    labetalol, 10 mg, Intravenous, Q2H PRN    morphine, 2 mg, Intravenous, Q2H PRN    ondansetron, 4 mg, Intravenous, Q6H PRN    prochlorperazine, 5 mg, Intravenous, Q6H PRN    promethazine, 12.5 mg, Intramuscular, Q6H PRN    traMADoL, 50 mg, Oral, Q4H PRN     Objective:     Vital Signs (Most Recent):  Temp: 97.7 °F (36.5 °C) (04/27/25 1125)  Pulse: 83 (04/27/25 1125)  Resp: 18 (04/27/25 1148)  BP: 138/78 (04/27/25 1125)  SpO2: 98 % (04/27/25 1125) Vital Signs (24h Range):  Temp:  [97.7 °F (36.5 °C)-98.8 °F (37.1 °C)] 97.7 °F (36.5 °C)  Pulse:  [83-87] 83  Resp:  [16-20]  18  SpO2:  [95 %-98 %] 98 %  BP: (103-138)/(71-85) 138/78       Intake/Output Summary (Last 24 hours) at 4/27/2025 1303  Last data filed at 4/27/2025 0700  Gross per 24 hour   Intake 180 ml   Output 855 ml   Net -675 ml       Physical Exam  Constitutional:       General: He is not in acute distress.     Appearance: Normal appearance. He is not ill-appearing.   HENT:      Head: Normocephalic and atraumatic.      Nose: Nose normal.      Mouth/Throat:      Mouth: Mucous membranes are moist.   Eyes:      Extraocular Movements: Extraocular movements intact.      Conjunctiva/sclera: Conjunctivae normal.   Cardiovascular:      Rate and Rhythm: Normal rate and regular rhythm.      Pulses: Normal pulses.      Heart sounds: Normal heart sounds.   Pulmonary:      Effort: Pulmonary effort is normal.      Breath sounds: Normal breath sounds.   Abdominal:      General: Abdomen is flat. Bowel sounds are normal. There is no distension.      Palpations: Abdomen is soft. There is no mass.      Tenderness: There is no abdominal tenderness. There is no guarding or rebound.      Hernia: No hernia is present.      Comments: Large hematoma     Musculoskeletal:         General: Normal range of motion.      Cervical back: Normal range of motion and neck supple.   Skin:     General: Skin is warm and dry.      Coloration: Skin is not jaundiced.   Neurological:      General: No focal deficit present.      Mental Status: He is alert.   Psychiatric:         Mood and Affect: Mood normal.         Behavior: Behavior normal.              Assessment/Plan:     Active Diagnoses:    Diagnosis Date Noted POA    PRINCIPAL PROBLEM:  Incisional hernia of anterior abdominal wall without obstruction or gangrene [K43.2] 12/05/2023 Yes    Postoperative hematoma of subcutaneous tissue following non-dermatologic procedure [L76.32] 04/25/2025 No    Acute blood loss anemia [D62] 04/25/2025 No    Left inguinal hernia [K40.90] 01/04/2024 Yes      Problems Resolved  During this Admission:     Hematoma    Evacuation of hematoma in AM    Jean Tierney MD  General Surgery  [facility] - 8th Floor Med Surg

## 2025-04-27 NOTE — PROGRESS NOTES
POD 3    AFVSS    Abd: hematoma    HH stable    May need evacuation    May require transfer to [facility] main

## 2025-04-28 ENCOUNTER — ANESTHESIA (OUTPATIENT)
Dept: SURGERY | Facility: HOSPITAL | Age: 65
End: 2025-04-28
Payer: MEDICARE

## 2025-04-28 ENCOUNTER — ANESTHESIA EVENT (OUTPATIENT)
Dept: SURGERY | Facility: HOSPITAL | Age: 65
End: 2025-04-28
Payer: MEDICARE

## 2025-04-28 LAB
POCT GLUCOSE: 170 MG/DL (ref 70–110)
POCT GLUCOSE: 172 MG/DL (ref 70–110)
POCT GLUCOSE: 177 MG/DL (ref 70–110)
POCT GLUCOSE: 224 MG/DL (ref 70–110)
PSYCHE PATHOLOGY RESULT: NORMAL

## 2025-04-28 PROCEDURE — 27000221 HC OXYGEN, UP TO 24 HOURS

## 2025-04-28 PROCEDURE — 63600175 PHARM REV CODE 636 W HCPCS: Performed by: NURSE PRACTITIONER

## 2025-04-28 PROCEDURE — 71000033 HC RECOVERY, INTIAL HOUR: Performed by: SURGERY

## 2025-04-28 PROCEDURE — 11000001 HC ACUTE MED/SURG PRIVATE ROOM

## 2025-04-28 PROCEDURE — 37000009 HC ANESTHESIA EA ADD 15 MINS: Performed by: SURGERY

## 2025-04-28 PROCEDURE — 36000704 HC OR TIME LEV I 1ST 15 MIN: Performed by: SURGERY

## 2025-04-28 PROCEDURE — 37000008 HC ANESTHESIA 1ST 15 MINUTES: Performed by: SURGERY

## 2025-04-28 PROCEDURE — 27201423 OPTIME MED/SURG SUP & DEVICES STERILE SUPPLY: Performed by: SURGERY

## 2025-04-28 PROCEDURE — 36000705 HC OR TIME LEV I EA ADD 15 MIN: Performed by: SURGERY

## 2025-04-28 PROCEDURE — 0JC80ZZ EXTIRPATION OF MATTER FROM ABDOMEN SUBCUTANEOUS TISSUE AND FASCIA, OPEN APPROACH: ICD-10-PCS | Performed by: SURGERY

## 2025-04-28 PROCEDURE — P9047 ALBUMIN (HUMAN), 25%, 50ML: HCPCS | Performed by: NURSE ANESTHETIST, CERTIFIED REGISTERED

## 2025-04-28 PROCEDURE — C1729 CATH, DRAINAGE: HCPCS | Performed by: SURGERY

## 2025-04-28 PROCEDURE — 25000003 PHARM REV CODE 250: Performed by: NURSE ANESTHETIST, CERTIFIED REGISTERED

## 2025-04-28 PROCEDURE — 25000003 PHARM REV CODE 250: Performed by: SURGERY

## 2025-04-28 PROCEDURE — 99900035 HC TECH TIME PER 15 MIN (STAT)

## 2025-04-28 PROCEDURE — 94760 N-INVAS EAR/PLS OXIMETRY 1: CPT

## 2025-04-28 PROCEDURE — 94799 UNLISTED PULMONARY SVC/PX: CPT

## 2025-04-28 PROCEDURE — 10140 I&D HMTMA SEROMA/FLUID COLLJ: CPT | Mod: 78,,, | Performed by: SURGERY

## 2025-04-28 PROCEDURE — 25000003 PHARM REV CODE 250: Performed by: NURSE PRACTITIONER

## 2025-04-28 PROCEDURE — 63600175 PHARM REV CODE 636 W HCPCS: Performed by: NURSE ANESTHETIST, CERTIFIED REGISTERED

## 2025-04-28 PROCEDURE — 99900031 HC PATIENT EDUCATION (STAT)

## 2025-04-28 PROCEDURE — 82962 GLUCOSE BLOOD TEST: CPT | Performed by: SURGERY

## 2025-04-28 RX ORDER — PROPOFOL 10 MG/ML
VIAL (ML) INTRAVENOUS
Status: DISCONTINUED | OUTPATIENT
Start: 2025-04-28 | End: 2025-04-28

## 2025-04-28 RX ORDER — FENTANYL CITRATE 50 UG/ML
INJECTION, SOLUTION INTRAMUSCULAR; INTRAVENOUS
Status: DISCONTINUED | OUTPATIENT
Start: 2025-04-28 | End: 2025-04-28

## 2025-04-28 RX ORDER — LIDOCAINE HYDROCHLORIDE 20 MG/ML
INJECTION, SOLUTION EPIDURAL; INFILTRATION; INTRACAUDAL; PERINEURAL
Status: DISCONTINUED | OUTPATIENT
Start: 2025-04-28 | End: 2025-04-28

## 2025-04-28 RX ORDER — ALBUMIN HUMAN 250 G/1000ML
SOLUTION INTRAVENOUS
Status: DISCONTINUED | OUTPATIENT
Start: 2025-04-28 | End: 2025-04-28

## 2025-04-28 RX ORDER — ROCURONIUM BROMIDE 10 MG/ML
INJECTION, SOLUTION INTRAVENOUS
Status: DISCONTINUED | OUTPATIENT
Start: 2025-04-28 | End: 2025-04-28

## 2025-04-28 RX ORDER — CALCIUM CHLORIDE INJECTION 100 MG/ML
INJECTION, SOLUTION INTRAVENOUS
Status: DISCONTINUED | OUTPATIENT
Start: 2025-04-28 | End: 2025-04-28

## 2025-04-28 RX ORDER — CEFAZOLIN SODIUM 1 G/3ML
INJECTION, POWDER, FOR SOLUTION INTRAMUSCULAR; INTRAVENOUS
Status: DISCONTINUED | OUTPATIENT
Start: 2025-04-28 | End: 2025-04-28

## 2025-04-28 RX ORDER — PHENYLEPHRINE HCL IN 0.9% NACL 1 MG/10 ML
SYRINGE (ML) INTRAVENOUS
Status: DISCONTINUED | OUTPATIENT
Start: 2025-04-28 | End: 2025-04-28

## 2025-04-28 RX ORDER — GLUCAGON 1 MG
1 KIT INJECTION
Status: DISCONTINUED | OUTPATIENT
Start: 2025-04-28 | End: 2025-04-28 | Stop reason: HOSPADM

## 2025-04-28 RX ORDER — SODIUM CHLORIDE 0.9 % (FLUSH) 0.9 %
10 SYRINGE (ML) INJECTION
Status: DISCONTINUED | OUTPATIENT
Start: 2025-04-28 | End: 2025-04-28 | Stop reason: HOSPADM

## 2025-04-28 RX ORDER — HYDROMORPHONE HYDROCHLORIDE 2 MG/ML
0.4 INJECTION, SOLUTION INTRAMUSCULAR; INTRAVENOUS; SUBCUTANEOUS EVERY 5 MIN PRN
Status: DISCONTINUED | OUTPATIENT
Start: 2025-04-28 | End: 2025-04-28 | Stop reason: HOSPADM

## 2025-04-28 RX ADMIN — PANTOPRAZOLE SODIUM 40 MG: 40 TABLET, DELAYED RELEASE ORAL at 08:04

## 2025-04-28 RX ADMIN — PROCHLORPERAZINE EDISYLATE 5 MG: 5 INJECTION INTRAMUSCULAR; INTRAVENOUS at 11:04

## 2025-04-28 RX ADMIN — ONDANSETRON 4 MG: 2 INJECTION INTRAMUSCULAR; INTRAVENOUS at 10:04

## 2025-04-28 RX ADMIN — DOCUSATE SODIUM 50 MG: 50 CAPSULE, LIQUID FILLED ORAL at 08:04

## 2025-04-28 RX ADMIN — ROCURONIUM BROMIDE 10 MG: 10 SOLUTION INTRAVENOUS at 10:04

## 2025-04-28 RX ADMIN — Medication 100 MCG: at 10:04

## 2025-04-28 RX ADMIN — HYDROCODONE BITARTRATE AND ACETAMINOPHEN 1 TABLET: 7.5; 325 TABLET ORAL at 06:04

## 2025-04-28 RX ADMIN — ALBUMIN (HUMAN) 100 ML: 12.5 SOLUTION INTRAVENOUS at 10:04

## 2025-04-28 RX ADMIN — SODIUM CHLORIDE, SODIUM GLUCONATE, SODIUM ACETATE, POTASSIUM CHLORIDE AND MAGNESIUM CHLORIDE: 526; 502; 368; 37; 30 INJECTION, SOLUTION INTRAVENOUS at 10:04

## 2025-04-28 RX ADMIN — LIDOCAINE HYDROCHLORIDE 80 MG: 20 INJECTION, SOLUTION EPIDURAL; INFILTRATION; INTRACAUDAL; PERINEURAL at 10:04

## 2025-04-28 RX ADMIN — CALCIUM CHLORIDE INJECTION 0.3 G: 100 INJECTION, SOLUTION INTRAVENOUS at 10:04

## 2025-04-28 RX ADMIN — SACUBITRIL AND VALSARTAN 1 TABLET: 24; 26 TABLET, FILM COATED ORAL at 08:04

## 2025-04-28 RX ADMIN — CEFAZOLIN 1 G: 330 INJECTION, POWDER, FOR SOLUTION INTRAMUSCULAR; INTRAVENOUS at 10:04

## 2025-04-28 RX ADMIN — CALCIUM CHLORIDE INJECTION 0.2 G: 100 INJECTION, SOLUTION INTRAVENOUS at 10:04

## 2025-04-28 RX ADMIN — METOPROLOL SUCCINATE 100 MG: 50 TABLET, EXTENDED RELEASE ORAL at 08:04

## 2025-04-28 RX ADMIN — FENTANYL CITRATE 50 MCG: 50 INJECTION, SOLUTION INTRAMUSCULAR; INTRAVENOUS at 10:04

## 2025-04-28 RX ADMIN — Medication 200 MCG: at 10:04

## 2025-04-28 RX ADMIN — AMIODARONE HYDROCHLORIDE 200 MG: 200 TABLET ORAL at 10:04

## 2025-04-28 RX ADMIN — MUPIROCIN: 20 OINTMENT TOPICAL at 08:04

## 2025-04-28 RX ADMIN — PROPOFOL 100 MG: 10 INJECTION, EMULSION INTRAVENOUS at 10:04

## 2025-04-28 RX ADMIN — AMIODARONE HYDROCHLORIDE 200 MG: 200 TABLET ORAL at 08:04

## 2025-04-28 RX ADMIN — SUGAMMADEX 150 MG: 100 INJECTION, SOLUTION INTRAVENOUS at 10:04

## 2025-04-28 RX ADMIN — SACUBITRIL AND VALSARTAN 1 TABLET: 24; 26 TABLET, FILM COATED ORAL at 10:04

## 2025-04-28 RX ADMIN — HYDROCODONE BITARTRATE AND ACETAMINOPHEN 1 TABLET: 7.5; 325 TABLET ORAL at 10:04

## 2025-04-28 RX ADMIN — ROCURONIUM BROMIDE 40 MG: 10 SOLUTION INTRAVENOUS at 10:04

## 2025-04-28 NOTE — PLAN OF CARE
"   04/28/25 1250   Discharge Assessment   Assessment Type Discharge Planning Assessment   Confirmed/corrected address, phone number and insurance Yes   Confirmed Demographics Correct on Facesheet   Source of Information patient;family   When was your last doctors appointment? 01/14/25   Communicated JAYDE with patient/caregiver Date not available/Unable to determine   Reason For Admission L inguinal hernia   People in Home alone   Do you expect to return to your current living situation? Other (see comments)  (see note)   Do you have help at home or someone to help you manage your care at home? Yes   Who are your caregiver(s) and their phone number(s)? Juan Ramon Deluna [phone]   Prior to hospitilization cognitive status: Alert/Oriented   Current cognitive status: Alert/Oriented   Walking or Climbing Stairs Difficulty no   Dressing/Bathing Difficulty no   Home Accessibility other (see comments)  (see note)   Home Layout Able to live on 1st floor   Equipment Currently Used at Home other (see comments)  (Dexcomm)   Patient currently being followed by outpatient case management? No   Do you currently have service(s) that help you manage your care at home? No   Do you take prescription medications? Yes   Do you have prescription coverage? Yes   Do you have any problems affording any of your prescribed medications? No   Is the patient taking medications as prescribed? yes   How do you get to doctors appointments? family or friend will provide   Are you on dialysis? No   Do you take coumadin? No   Discharge Plan A Other  (tbd)   Discharge Plan B Other  (tbd)   DME Needed Upon Discharge  other (see comments)  (tbd)   Discharge Plan discussed with: Patient;Sibling   Name(s) and Number(s) Juan Ramon Deluna [phone]   Transition of Care Barriers Other (see comments)  (Home is not liveable)     Had a long talk with pt's brother, Juan Ramon Deluna, while pt was in surgery. Pt lives in the "family home" on the same property as brother. " Juan Ramon has a 1 bedroom trailer behind pt's home. Pt has 3 siblings. Juan Ramon, a sister named Krystal in [facility] and a brother in [place], [place]. Brother said when he went into pt's home recently the home was deplorable. Ceiling is missing in large area of the home and you can see the roof from inside. Roof is leaking in multiple places. Pt has hoarding issues. 15+ cats living in the home. Coming in/out through holes in gray. Pt has stayed with his brother short term but has to sleep on the sofa since he doesn't have extra rooms. Plan is to demolish the home and find a camper for pt to put on the property. Pt is affiliated with a [employer] and brother is going to speak to the  today for help. He is concerned about a place for pt to go in the interim. He said pt is very frail and not getting around well but at the same time can't move around because of the home being filled with stuff. Spoke to him about pt going to a SNF for rehab prior to going home. Pt's brother said this is ideal. His long term goal is for pt to go home to whatever structure they can put in place for him. MD notified and order for PT/OT eval aileeneated.   Assessment done with pt once he returned from surgery. Pt was wide awake and talkative.   Pt told me that they will have to demolish his home. We talked about him needing therapy once he leaves here and pt is open to it. Will give patient list once therapy recs are available. Pt had HH in the past but is not currently on service. Although referral was sent to [facility] this hospitalization. No DME and no need for it per pt. Pt has a Dexcomm on his RUE that he said he changes without help.

## 2025-04-28 NOTE — BRIEF OP NOTE
[facility] - Periop Services  Brief Operative Note    SUMMARY     Surgery Date: 4/28/2025     Surgeons and Role:     * Jean Tierney MD    Assisting: MARSHA Ramos      Pre-op Diagnosis:  Postoperative hematoma of subcutaneous tissue following non-dermatologic procedure [L76.32]    Post-op Diagnosis:  Post-Op Diagnosis Codes:     * Postoperative hematoma of subcutaneous tissue following non-dermatologic procedure [L76.32]    Procedure: Evacuation of midline abdominal wall hematoma, Abdominal wound washout, placement of 15 channel KEESHA drain    Anesthesia: General/MAC    Operative Findings: dictated per surgeon  No active bleeding noted. Old clot contents of hematoma removed.     Estimated Blood Loss: 5 cc    Estimated Blood Loss has been documented.         Specimens:   Specimen (24h ago, onward)      None          * No specimens in log *    [id number]

## 2025-04-28 NOTE — TRANSFER OF CARE
"Anesthesia Transfer of Care Note    Patient: Tashi Deluna    Procedure(s) Performed: Procedure(s) (LRB):  Incision and Drainage (N/A)    Patient location: PACU    Anesthesia Type: general    Transport from OR: Transported from OR on room air with adequate spontaneous ventilation    Post pain: adequate analgesia    Post assessment: no apparent anesthetic complications and tolerated procedure well    Post vital signs: stable    Level of consciousness: awake, alert and oriented    Nausea/Vomiting: no nausea/vomiting    Complications: none    Transfer of care protocol was followed      Last vitals: Visit Vitals  /87   Pulse 85   Temp 37 °C (98.6 °F) (Oral)   Resp (!) 9   Ht 5' 9" (1.753 m)   Wt 72.8 kg (160 lb 7.9 oz)   SpO2 100%   BMI 23.70 kg/m²     "

## 2025-04-28 NOTE — ANESTHESIA PROCEDURE NOTES
Intubation    Date/Time: 4/28/2025 10:12 AM    Performed by: Monique Padilla CRNA  Authorized by: Narinder Hernandez MD    Intubation:     Induction:  Intravenous    Intubated:  Postinduction    Mask Ventilation:  Easy mask    Attempts:  1    Attempted By:  CRNA    Method of Intubation:  Direct    Blade:  Mendez 2    Laryngeal View Grade: Grade I - full view of cords      Difficult Airway Encountered?: No      Complications:  None    Airway Device:  Oral endotracheal tube    Airway Device Size:  7.5    Style/Cuff Inflation:  Cuffed (inflated to minimal occlusive pressure)    Tube secured:  22    Secured at:  The lips    Placement Verified By:  Capnometry    Complicating Factors:  None    Findings Post-Intubation:  BS equal bilateral and atraumatic/condition of teeth unchanged

## 2025-04-28 NOTE — ANESTHESIA PREPROCEDURE EVALUATION
04/28/2025  Tashi Deluna is a 65 y.o., male.    Mr. Tashi Deluna is a 65 y.o. male who presents to clinic for surgical repair of abdominal ventral hernia and left inguinal hernia.   He started noticing a bulge to abdominal wall.  It occasionally causes some pain and discomfort.   Denies any changes to bowel / bladder habits. He denies CP/SOB or fever/chills.      Positive symptoms:   + abdominal bulge to midline, + left groin bulge     S/p evacuation of right scrotal post op hematoma, seroma 7/29/2024 per Dr. Tierney.      S/p Diagnostic laparoscopy, Laparoscopic reduction of right inguinal hernia (large, incarcerated), and Laparoscopic right inguinal hernia repair with mesh TEPP 2/26/24 per Dr. Tierney. Incisional hernia was deferred at this time due to complexity of right inguinal hernia repair.      Patient Care Team:  Guevara Torres MD as PCP - General (Family Medicine)  Jean Tierney MD as Surgeon (General Surgery)  Kin Hubbard MD as Consulting Physician (Cardiology)  Jus Douglass MD (Endocrinology)  Kyler Mckeon MD (Gastroenterology)         Current Facility-Administered Medications on File Prior to Encounter   Medication    lactated ringers infusion           Current Outpatient Medications on File Prior to Encounter   Medication Sig    ENTRESTO 24-26 mg per tablet Take 1 tablet by mouth 2 (two) times daily.    ergocalciferol, vitamin D2, (VITAMIN D ORAL) Take by mouth.    insulin regular 100 unit/mL Inj injection Inject into the skin 3 (three) times daily before meals. Per sliding scale    LANTUS U-100 INSULIN 100 unit/mL injection Inject 10 Units into the skin Daily. Per sliding scale    metoprolol succinate (TOPROL-XL) 100 MG 24 hr tablet Take 100 mg by mouth once daily.    pantoprazole (PROTONIX) 40 MG tablet Take 40 mg by mouth once daily.    rosuvastatin  (CRESTOR) 20 MG tablet Take 20 mg by mouth every evening.    vitamin D (VITAMIN D3) 1000 units Tab Take 1,000 Units by mouth once daily.    amiodarone (PACERONE) 400 MG tablet Take 0.5 tablets (200 mg total) by mouth 2 (two) times daily. Amiodarone 200mg tablets 400mg twice daily x 3 days then 200mg twice daily x 3 days then 200mg daily    cyanocobalamin, vitamin B-12, (VITAMIN B-12 ORAL) Take by m     The left ventricle is normal in size with mild concentric hypertrophy and mildly decreased systolic function.  The estimated ejection fraction is 48%.  Grade II left ventricular diastolic dysfunction.  Mild right ventricular enlargement with low normal right ventricular systolic function.  Mild mitral regurgitation.  Mild tricuspid regurgitation.     Echo density protruding from septum in LV. It does not resemble a thrombus.  Pre-op Assessment          Review of Systems      Physical Exam  General: Well nourished    Airway:  Mallampati: II / I  Mouth Opening: Normal    Chest/Lungs:  Normal Respiratory Rate    Heart:  Rate: Normal    Anesthesia Plan  Type of Anesthesia, risks & benefits discussed:    Anesthesia Type: Gen Supraglottic Airway  Intra-op Monitoring Plan: Standard ASA Monitors  Post Op Pain Control Plan: multimodal analgesia  Induction:  IV  Airway Plan: Direct, Post-Induction  Informed Consent: Informed consent signed with the Patient and all parties understand the risks and agree with anesthesia plan.  All questions answered. Patient consented to blood products? Yes  ASA Score: 3  Day of Surgery Review of History & Physical: H&P Update referred to the surgeon/provider.  Anesthesia Plan Notes: LVEF 48%    Ready For Surgery From Anesthesia Perspective.   .

## 2025-04-28 NOTE — PLAN OF CARE
04/28/25 0831   Medicare Message   Important Message from Medicare regarding Discharge Appeal Rights Given to patient/caregiver;Explained to patient/caregiver

## 2025-04-29 ENCOUNTER — RESULTS FOLLOW-UP (OUTPATIENT)
Dept: SURGERY | Facility: HOSPITAL | Age: 65
End: 2025-04-29

## 2025-04-29 LAB
POCT GLUCOSE: 193 MG/DL (ref 70–110)
POCT GLUCOSE: 196 MG/DL (ref 70–110)
POCT GLUCOSE: 230 MG/DL (ref 70–110)

## 2025-04-29 PROCEDURE — 99900035 HC TECH TIME PER 15 MIN (STAT)

## 2025-04-29 PROCEDURE — 97162 PT EVAL MOD COMPLEX 30 MIN: CPT

## 2025-04-29 PROCEDURE — 94760 N-INVAS EAR/PLS OXIMETRY 1: CPT

## 2025-04-29 PROCEDURE — 25000003 PHARM REV CODE 250: Performed by: SURGERY

## 2025-04-29 PROCEDURE — 97166 OT EVAL MOD COMPLEX 45 MIN: CPT

## 2025-04-29 PROCEDURE — 27000221 HC OXYGEN, UP TO 24 HOURS

## 2025-04-29 PROCEDURE — 25000003 PHARM REV CODE 250: Performed by: NURSE PRACTITIONER

## 2025-04-29 PROCEDURE — 11000001 HC ACUTE MED/SURG PRIVATE ROOM

## 2025-04-29 PROCEDURE — 94799 UNLISTED PULMONARY SVC/PX: CPT

## 2025-04-29 RX ADMIN — HYDROCODONE BITARTRATE AND ACETAMINOPHEN 1 TABLET: 7.5; 325 TABLET ORAL at 09:04

## 2025-04-29 RX ADMIN — SACUBITRIL AND VALSARTAN 1 TABLET: 24; 26 TABLET, FILM COATED ORAL at 09:04

## 2025-04-29 RX ADMIN — AMIODARONE HYDROCHLORIDE 200 MG: 200 TABLET ORAL at 09:04

## 2025-04-29 RX ADMIN — PANTOPRAZOLE SODIUM 40 MG: 40 TABLET, DELAYED RELEASE ORAL at 09:04

## 2025-04-29 RX ADMIN — TRAMADOL HYDROCHLORIDE 50 MG: 50 TABLET, COATED ORAL at 01:04

## 2025-04-29 RX ADMIN — DOCUSATE SODIUM 50 MG: 50 CAPSULE, LIQUID FILLED ORAL at 09:04

## 2025-04-29 RX ADMIN — TRAMADOL HYDROCHLORIDE 50 MG: 50 TABLET, COATED ORAL at 09:04

## 2025-04-29 RX ADMIN — METOPROLOL SUCCINATE 100 MG: 50 TABLET, EXTENDED RELEASE ORAL at 09:04

## 2025-04-29 NOTE — PLAN OF CARE
Problem: Occupational Therapy  Goal: Occupational Therapy Goal  Description: Goals to be met by: in 1 month      Patient will increase functional independence with ADLs by performing:    LE Dressing with Modified [place].  Grooming while standing at sink with Modified [place].  Toileting from toilet with Modified [place] for hygiene and clothing management.   Toilet transfer to toilet with Modified [place].    4/29/2025 1122 by Jennifer Kim OT  Outcome: Progressing

## 2025-04-29 NOTE — PT/OT/SLP EVAL
Occupational Therapy  Evaluation    Name: Tashi Deluna  MRN: [id number]  Admitting Diagnosis: hernia   Recent Surgery: Procedure(s) (LRB):  Incision and Drainage (N/A)  INCISION AND DRAINAGE, ABDOMEN 1 Day Post-Op    Recommendations:     Discharge therapy intensity: Moderate Intensity Therapy   Discharge Equipment Recommendations:  to be determined by next level of care  Barriers to discharge:  Inaccessible home environment    Assessment:     Tashi Deluna is a 65 y.o. male with a medical diagnosis of surgical repair of abdominal ventral hernia and L inguinal hernia.  He presents with the following performance deficits affecting function: weakness, impaired endurance, impaired self care skills, impaired functional mobility, gait instability, impaired balance, pain.   Pt lives at home alone. Apparently, home is not in a livable condition and needs to be demolished per CM notes. Today, pt in increased pain, but ambulated to bathroom with RW and min A for t/fs, CGA for ambulation. Pt unable to reach BLE and would benefit from a hip kit. Recommend moderate intensity at this time.     Rehab Prognosis: Good; patient would benefit from acute skilled OT services to address these deficits and reach maximum level of function.       Plan:     Patient to be seen 5 x/week to address the above listed problems via self-care/home management, therapeutic activities, therapeutic exercises  Plan of Care Expires: 05/29/25  Plan of Care Reviewed with: patient    Subjective     Chief Complaint: abdominal pain   Patient/Family Comments/goals: keep independence     Occupational Profile:  Living Environment: pt lives alone in a trailer on family's property with other family near, walk in shower   Previous level of function: indep with ADLs and functional mobility   Roles and Routines: does not drive or work   Equipment Used at Home: cane, straight  Assistance upon Discharge: TBD     Pain/Comfort:  Pain Rating 1: 6/10  Location - Orientation 1:  lower  Location 1: abdomen  Pain Addressed 1: Reposition    Patients cultural, spiritual, Rastafarian conflicts given the current situation:      Objective:     OT communicated with nrsg  prior to session.      Patient was found sitting edge of bed with  PT and wu drain, IV upon OT entry to room.    General Precautions: Standard, fall  Orthopedic Precautions:    Braces:        Bed Mobility:    Sitting EOB with PT upon arrival     Functional Mobility/Transfers:  Patient completed Toilet Transfer Step Transfer technique with minimum assistance with  rolling walker and grab bars  Functional Mobility: t/f training ; min A for t/f; CGA for ambulation     Activities of Daily Living:  Lower Body Dressing: maximal assistance would benefit from hip kit   Toileting: minimum assistance for t/f practice using RW and grab bar     AMPA 6 Click ADL:  AMPAC Total Score: 17    Functional Cognition:  Affect: Appropriate to situation and Cooperative    Upper Extremity Function:  Right Upper Extremity:   WFL    Left Upper Extremity:  WFL    Balance:   Dynamic standing balance:CGA for ambulation with RW     Therapeutic Positioning  Risk for acquired pressure injuries is decreased due to ability to get to BSC/toilet with assist.      Patient Education:  Patient provided with verbal education education regarding OT role/goals/POC, fall prevention, safety awareness, and Discharge/DME recommendations.  Understanding was verbalized.     Patient left up in chair with all lines intact and call button in reach.    GOALS:   Multidisciplinary Problems       Occupational Therapy Goals          Problem: Occupational Therapy    Goal Priority Disciplines Outcome Interventions   Occupational Therapy Goal     OT, PT/OT Progressing    Description: Goals to be met by: in 1 month      Patient will increase functional independence with ADLs by performing:    LE Dressing with Modified [place].  Grooming while standing at sink with Modified  [place].  Toileting from toilet with Modified [place] for hygiene and clothing management.   Toilet transfer to toilet with Modified [place].                         History:     Past Medical History:   Diagnosis Date    Atrial fibrillation     CHF (congestive heart failure)     Dysphagia     Fatigue     GERD (gastroesophageal reflux disease)     History of acute pancreatitis     History of anxiety     Incisional hernia of anterior abdominal wall without obstruction or gangrene     Inguinal hernia, bilateral     Neuropathy, diabetic     SOB (shortness of breath)     Type 1 diabetes mellitus     Weakness          Past Surgical History:   Procedure Laterality Date    ABLATION N/A 07/05/2022    Procedure: ABLATION;  Surgeon: Kin Hubbard MD;  Location: [facility] CATH LAB;  Service: Cardiology;  Laterality: N/A;  AV NODE ABLATION W/ ANEST.    CARDIAC ELECTROPHYSIOLOGY STUDY AND ABLATION      CARDIOVERSION      COLONOSCOPY  2021    DIAGNOSTIC LAPAROSCOPY N/A 4/23/2025    Procedure: LAPAROSCOPY, DIAGNOSTIC;  Surgeon: Jean Aguiar MD;  Location: [facility];  Service: General;  Laterality: N/A;    EGD, WITH FOREIGN BODY REMOVAL N/A 12/11/2024    Procedure: EGD, WITH FOREIGN BODY REMOVAL;  Surgeon: Samuel De La Garza MD;  Location: [facility] OR;  Service: Gastroenterology;  Laterality: N/A;    EVACUATION, HEMATOMA, INGUINAL REGION Right 7/29/2024    Procedure: EVACUATION, HEMATOMA, INGUINAL REGION  //right  / Evacuation of right groin/scrotal hematoma/seroma;  Surgeon: Jean Aguiar MD;  Location: [facility] OR;  Service: General;  Laterality: Right;  Evacuation of right groin/scrotal hematoma/seroma    Gastric Ulcer Sx  2009    Dr. aguiar    HERNIA REPAIR Right     Open (6 years old)    INCISION AND DRAINAGE N/A 4/28/2025    Procedure: Incision and Drainage;  Surgeon: Jean Aguiar MD;  Location: [facility];  Service: General;  Laterality: N/A;    INCISION AND DRAINAGE OF ABDOMEN  4/28/2025    Procedure: INCISION  AND DRAINAGE, ABDOMEN;  Surgeon: Jean Tierney MD;  Location: [facility] OR;  Service: General;;    INSERTION OF PACEMAKER  04/2022    REPAIR, HERNIA, INCISIONAL  2/26/2024    Procedure: REPAIR, HERNIA, INCISIONAL;  Surgeon: Jean Tierney MD;  Location: [facility] OR;  Service: General;;  Open abdominal incisional hernia repair with mesh.    REPAIR, HERNIA, INCISIONAL N/A 4/23/2025    Procedure: OPEN INCISIONAL HERNIA REPAIR WITH COMPONENT SEPARATION WITH PLACEMENT OF 20 X 30 CM PARIETEX MESH;  Surgeon: Jean Tierney MD;  Location: [facility] OR;  Service: General;  Laterality: N/A;    REPAIR, HERNIA, INGUINAL Right 2/26/2024    Procedure: REPAIR, HERNIA, INGUINAL;  Surgeon: Jean Tierney MD;  Location: [facility] OR;  Service: General;  Laterality: Right;  Open right inguinal hernia repair with mesh.    REPAIR, HERNIA, INGUINAL, LAPAROSCOPIC Left 4/23/2025    Procedure: REPAIR, HERNIA, INGUINAL, LAPAROSCOPIC;  Surgeon: Jean Tierney MD;  Location: [facility] OR;  Service: General;  Laterality: Left;       Time Tracking:     OT Date of Treatment:    OT Start Time: 1010  OT Stop Time: 1021  OT Total Time (min): 11 min    Billable Minutes:Evaluation Moderate Complexity     4/29/2025

## 2025-04-29 NOTE — PROGRESS NOTES
Please inform patient surgical pathology results were normal (benign). No additional workup required at this time. Please keep routine post op appointment as scheduled.

## 2025-04-29 NOTE — PLAN OF CARE
Problem: Physical Therapy  Goal: Physical Therapy Goal  Description: Goals to be met by: 25     Patient will increase functional independence with mobility by performin. Supine to sit with Modified [place]  2. Sit to stand transfer with Modified [place]  3. Bed to chair transfer with Modified [place] using No Assistive Device  4. Gait  x 150 feet with Modified [place] using No Assistive Device.   5. Ascend/descend 3 stair without Handrails Stand-by Assistance    Outcome: Progressing

## 2025-04-29 NOTE — OP NOTE
[facility] - 8th Floor Med Surg  Surgery Department  Operative Note    SUMMARY     Date of Procedure: 4/28/2025     Procedure: Procedure(s) (LRB):  Incision and Drainage (N/A)     Surgeons and Role:     * Jean Tierney MD - Primary    Assisting Surgeon: BEATRICE Rogel NP     No qualified resident available to assist.     Pre-Operative Diagnosis: Postoperative hematoma of subcutaneous tissue following non-dermatologic procedure [L76.32]    Post-Operative Diagnosis: Post-Op Diagnosis Codes:     * Postoperative hematoma of subcutaneous tissue following non-dermatologic procedure [L76.32]    Anesthesia: General/MAC    Operative Findings (including complications, if any): subcutaneous flap hematoma    Description of Technical Procedures:  Patient was taken room and placed on the table in the supine position after induction of anesthetic the abdomen was prepped and draped in usual sterile fashion.  The previously placed drain was removed staples were removed.  The wound was opened moderate amount of hematoma was evacuated from the sub cutaneous flap above the fascial closure.  No mesh was exposed.  The area was irrigated clear.  There was no bleeding noticed.  The flaps were then closed over a drain patient tolerated procedure well.    Significant Surgical Tasks Conducted by the Assistant(s), if Applicable:      Estimated Blood Loss (EBL): * No values recorded between 4/28/2025  9:58 AM and 4/28/2025 11:08 AM *           Implants: * No implants in log *    Specimens:   Specimen (24h ago, onward)      None           * No specimens in log *           Condition: Good    Disposition: PACU - hemodynamically stable.    Attestation: Op Note Attestation: I was physically present and scrubbed for the entire procedure.

## 2025-04-29 NOTE — PROGRESS NOTES
[facility] - 8th Floor Med Surg  General Surgery  Progress Note  Interval History: POD# 1/6  Procedure(s) :  POD# 1 S/P Evacuation of hematoma midline abdomen    POD# 6 S/POPEN INCISIONAL HERNIA REPAIR WITH COMPONENT SEPARATION WITH PLACEMENT OF 20 X 30 CM PARIETEX MESH  LAPAROSCOPY, DIAGNOSTIC   REPAIR, HERNIA, INGUINAL, LAPAROSCOPIC (Left)    AFVSS. T max x 24 hours 98.8. T current 98.3.    Pt tolerating soft diet without issues. On bedrest since surgery yesterday. Voiding well in urinal. + some flatus and belching. No BM. Incisional discomfort moderate, controlled with oral pain med, worse with movement.     KEESHA output x 24 hours 65 cc    NS@ 100 cc/hr    -200 this AM. No AM labs ordered.     Post-Op Info:  Procedure(s) (LRB):  Incision and Drainage (N/A)  INCISION AND DRAINAGE, ABDOMEN   1 Day Post-Op      Medications:  Continuous Infusions:   0.9% NaCl   Intravenous Continuous 100 mL/hr at 04/26/25 1539 New Bag at 04/26/25 1539    lactated ringers   Intravenous Continuous   Stopped at 04/23/25 1517     Scheduled Meds:   amiodarone  200 mg Oral BID    docusate sodium  50 mg Oral Daily    metoprolol succinate  100 mg Oral Daily    pantoprazole  40 mg Oral Daily    sacubitriL-valsartan  1 tablet Oral BID     PRN Meds:  Current Facility-Administered Medications:     0.9%  NaCl infusion (for blood administration), , Intravenous, Q24H PRN    cloNIDine 0.1 mg/24 hr td ptwk, 1 patch, Transdermal, Once PRN    dextrose 50%, 12.5 g, Intravenous, PRN    diphenhydrAMINE, 25 mg, Intravenous, Q6H PRN    glucagon (human recombinant), 1 mg, Intramuscular, PRN    hydrALAZINE, 10 mg, Intravenous, Q4H PRN    HYDROcodone-acetaminophen, 1 tablet, Oral, Q6H PRN    hyoscyamine, 0.125 mg, Sublingual, Q4H PRN    insulin aspart U-100, 0-10 Units, Subcutaneous, Q6H PRN    labetalol, 10 mg, Intravenous, Q2H PRN    morphine, 2 mg, Intravenous, Q2H PRN    ondansetron, 4 mg, Intravenous, Q6H PRN    prochlorperazine, 5 mg,  Intravenous, Q6H PRN    promethazine, 12.5 mg, Intramuscular, Q6H PRN    traMADoL, 50 mg, Oral, Q4H PRN     Objective:     Vital Signs (Most Recent):  Temp: 98.3 °F (36.8 °C) (04/29/25 0815)  Pulse: 86 (04/29/25 0815)  Resp: 18 (04/29/25 0905)  BP: 121/78 (04/29/25 0815)  SpO2: 98 % (04/29/25 0855) Vital Signs (24h Range):  Temp:  [96.8 °F (36 °C)-98.8 °F (37.1 °C)] 98.3 °F (36.8 °C)  Pulse:  [84-90] 86  Resp:  [9-19] 18  SpO2:  [95 %-100 %] 98 %  BP: (106-146)/(69-87) 121/78       Intake/Output Summary (Last 24 hours) at 4/29/2025 0945  Last data filed at 4/29/2025 0757  Gross per 24 hour   Intake 400 ml   Output 1540 ml   Net -1140 ml       Physical Exam  Constitutional:       General: He is not in acute distress.     Appearance: Normal appearance.   Cardiovascular:      Rate and Rhythm: Normal rate and regular rhythm.   Pulmonary:      Effort: Pulmonary effort is normal.      Breath sounds: Normal breath sounds.   Abdominal:      General: Abdomen is flat. Bowel sounds are normal.      Palpations: Abdomen is soft.      Hernia: No hernia is present.      Comments: Midline incision clean dry and intact with staples. No active bleeding.   KEESHA with scant bloody drainage.   Resolving hematoma to anterior abdominal wall, left groin, penis, and scrotum. Soft, no erythema. Abdomen benign.    Neurological:      Mental Status: He is alert.       Surgical dressing [place] dressing to midline removed and small areas of skin tears noted near edges from adhesive tape. Replaced dressing with sterile gauze and Abd pad. No tape placed. Dressing secured with abdominal binder.     Significant Labs:  No AM labs    Significant Diagnostics:  None    Assessment/Plan:     Active Diagnoses:    Diagnosis Date Noted POA    PRINCIPAL PROBLEM:  Incisional hernia of anterior abdominal wall without obstruction or gangrene [K43.2] 12/05/2023 Yes    Postoperative hematoma of subcutaneous tissue following non-dermatologic procedure [L76.32]  04/25/2025 No    Acute blood loss anemia [D62] 04/25/2025 No    Left inguinal hernia [K40.90] 01/04/2024 Yes      Problems Resolved During this Admission:     POD# 1/6    - Hemodynamically stable.  - Order regular ADA diet tray  - Wean IVF down to 50 cc/hr   - Encouraged ambulation in room, OOB to chair , and IS today to ensure no weakness.  - Start daily dressing changes to midline, avoid adhesive tape when possible.   - Check CBC today  - Possible DC tomorrow if remains HD stable    Ana María Rogel, ANP  General Surgery  [facility] - 8th Floor Med Surg

## 2025-04-29 NOTE — PLAN OF CARE
Spoke with pt and brother in pt's room. FOC signed. Patient choices are:  1-[facility] 2-[facility]. SSC notified. Gave pt coupons for [employer] to use after discharge. Pt's brother confirmed with pt that will be demolishing pt's home bc it is inhabitable. Pt seems down but understands. Pt's brother and pt's Mu-ism are working on a temporary place for him to live until they can rebill. Nurse notified of situation.

## 2025-04-29 NOTE — PT/OT/SLP EVAL
Physical Therapy Evaluation    Patient Name:  Tashi Deluna   MRN:  [id number]    Recommendations:     Discharge therapy intensity: Moderate Intensity Therapy   Discharge Equipment Recommendations: to be determined by next level of care   Barriers to discharge: Inaccessible home, Impaired mobility, and Ongoing medical needs    Assessment:     Tashi Deluna is a 65 y.o. male admitted with a medical diagnosis of surgical repair of abdominal ventral hernia and L inguinal hernia, now with post-op hematoma s/p evac + I&D.  He presents with the following impairments/functional limitations: weakness, impaired endurance, impaired self care skills, impaired functional mobility, gait instability, impaired balance, pain.    Pt with good tolerance to PT eval. Requiring CGA-Elmer overall and increased time to perform mobility with a RW. Pt lives alone and per CM note, home is in poor condition and awaiting demolition. Recommending moderate intensity therapy at d/c.    Rehab Prognosis: Good; patient would benefit from acute skilled PT services to address these deficits and reach maximum level of function.    Recent Surgery: Procedure(s) (LRB):  Incision and Drainage (N/A)  INCISION AND DRAINAGE, ABDOMEN 1 Day Post-Op    Plan:     During this hospitalization, patient would benefit from acute PT services 5 x/week to address the identified rehab impairments via gait training, therapeutic activities, therapeutic exercises and progress toward the following goals:    Plan of Care Expires:  05/29/25    Subjective     Chief Complaint: abdominal pain  Patient/Family Comments/goals: to get better  Pain/Comfort:  Pain Rating 1: 1/10  Location 1: abdomen  Pain Addressed 1: Reposition, Distraction    Patients cultural, spiritual, Rastafari conflicts given the current situation: no    Living Environment:  Pt lives alone in trailer with 3 YUDY and no rails. Per CM note, trailer awaiting demolition 2/2 poor living environment.  Prior to admission,  patients level of function was independent.  Equipment used at home: cane, straight.  DME owned (not currently used): single point cane.  Upon discharge, patient will have assistance from brother nearby.    Objective:     Communicated with RN prior to session.  Patient found HOB elevated with KEESHA drain, peripheral IV, SCD  upon PT entry to room.    General Precautions: Standard, fall  Orthopedic Precautions:N/A   Braces: N/A  Respiratory Status: Room air      Exams:  RLE ROM: WFL  RLE Strength: WFL  LLE ROM: WFL  LLE Strength: WFL  Skin integrity: Visible skin intact      Functional Mobility: increased time req'd for all mobility 2/2 pain  Bed Mobility:     Supine to Sit: minimum assistance and with HOB elevated  Transfers:     Sit to Stand:  minimum assistance with rolling walker  Toilet Transfer: minimum assistance with  rolling walker  using  Step Transfer  Gait: Pt amb 2 x 10' to/from bathroom with RW and CGA, forward trunk lean 2/2 abdominal pain  Balance: SBA-CGA overall      AM-PAC 6 CLICK MOBILITY  Total Score:18       Treatment & Education:  Patient provided with verbal education education regarding PT role/goals/POC, fall prevention, safety awareness, and discharge/DME recommendations.  Understanding was verbalized.     Patient left up in chair with all lines intact and call button in reach.    GOALS:   Multidisciplinary Problems       Physical Therapy Goals          Problem: Physical Therapy    Goal Priority Disciplines Outcome Interventions   Physical Therapy Goal     PT, PT/OT Progressing    Description: Goals to be met by: 25     Patient will increase functional independence with mobility by performin. Supine to sit with Modified [place]  2. Sit to stand transfer with Modified [place]  3. Bed to chair transfer with Modified [place] using No Assistive Device  4. Gait  x 150 feet with Modified [place] using No Assistive Device.   5. Ascend/descend 3 stair without Handrails  Stand-by Assistance                         History:     Past Medical History:   Diagnosis Date    Atrial fibrillation     CHF (congestive heart failure)     Dysphagia     Fatigue     GERD (gastroesophageal reflux disease)     History of acute pancreatitis     History of anxiety     Incisional hernia of anterior abdominal wall without obstruction or gangrene     Inguinal hernia, bilateral     Neuropathy, diabetic     SOB (shortness of breath)     Type 1 diabetes mellitus     Weakness        Past Surgical History:   Procedure Laterality Date    ABLATION N/A 07/05/2022    Procedure: ABLATION;  Surgeon: Kin Hubbard MD;  Location: [facility] CATH LAB;  Service: Cardiology;  Laterality: N/A;  AV NODE ABLATION W/ ANEST.    CARDIAC ELECTROPHYSIOLOGY STUDY AND ABLATION      CARDIOVERSION      COLONOSCOPY  2021    DIAGNOSTIC LAPAROSCOPY N/A 4/23/2025    Procedure: LAPAROSCOPY, DIAGNOSTIC;  Surgeon: Jean Aguiar MD;  Location: [facility] OR;  Service: General;  Laterality: N/A;    EGD, WITH FOREIGN BODY REMOVAL N/A 12/11/2024    Procedure: EGD, WITH FOREIGN BODY REMOVAL;  Surgeon: Samuel De La Garza MD;  Location: [facility] OR;  Service: Gastroenterology;  Laterality: N/A;    EVACUATION, HEMATOMA, INGUINAL REGION Right 7/29/2024    Procedure: EVACUATION, HEMATOMA, INGUINAL REGION  //right  / Evacuation of right groin/scrotal hematoma/seroma;  Surgeon: Jean Aguiar MD;  Location: [facility] OR;  Service: General;  Laterality: Right;  Evacuation of right groin/scrotal hematoma/seroma    Gastric Ulcer Sx  2009    Dr. aguiar    HERNIA REPAIR Right     Open (6 years old)    INCISION AND DRAINAGE N/A 4/28/2025    Procedure: Incision and Drainage;  Surgeon: Jean Aguiar MD;  Location: [facility] OR;  Service: General;  Laterality: N/A;    INCISION AND DRAINAGE OF ABDOMEN  4/28/2025    Procedure: INCISION AND DRAINAGE, ABDOMEN;  Surgeon: Jean Aguiar MD;  Location: [facility] OR;  Service: General;;    INSERTION OF PACEMAKER  04/2022     REPAIR, HERNIA, INCISIONAL  2/26/2024    Procedure: REPAIR, HERNIA, INCISIONAL;  Surgeon: Jean Tierney MD;  Location: [facility] OR;  Service: General;;  Open abdominal incisional hernia repair with mesh.    REPAIR, HERNIA, INCISIONAL N/A 4/23/2025    Procedure: OPEN INCISIONAL HERNIA REPAIR WITH COMPONENT SEPARATION WITH PLACEMENT OF 20 X 30 CM PARIETEX MESH;  Surgeon: Jean Tierney MD;  Location: [facility] OR;  Service: General;  Laterality: N/A;    REPAIR, HERNIA, INGUINAL Right 2/26/2024    Procedure: REPAIR, HERNIA, INGUINAL;  Surgeon: Jean Tierney MD;  Location: [facility] OR;  Service: General;  Laterality: Right;  Open right inguinal hernia repair with mesh.    REPAIR, HERNIA, INGUINAL, LAPAROSCOPIC Left 4/23/2025    Procedure: REPAIR, HERNIA, INGUINAL, LAPAROSCOPIC;  Surgeon: Jean Tierney MD;  Location: [facility] OR;  Service: General;  Laterality: Left;       Time Tracking:     PT Received On: 04/29/25  PT Start Time: 1004     PT Stop Time: 1020  PT Total Time (min): 16 min     Billable Minutes: Evaluation 16      04/29/2025

## 2025-04-30 PROBLEM — E44.0 MODERATE MALNUTRITION: Status: ACTIVE | Noted: 2025-04-30

## 2025-04-30 LAB
POCT GLUCOSE: 183 MG/DL (ref 70–110)
POCT GLUCOSE: 207 MG/DL (ref 70–110)

## 2025-04-30 PROCEDURE — 97535 SELF CARE MNGMENT TRAINING: CPT | Mod: CO

## 2025-04-30 PROCEDURE — 97530 THERAPEUTIC ACTIVITIES: CPT | Mod: CQ

## 2025-04-30 PROCEDURE — 97116 GAIT TRAINING THERAPY: CPT | Mod: CQ

## 2025-04-30 PROCEDURE — 25000003 PHARM REV CODE 250: Performed by: NURSE PRACTITIONER

## 2025-04-30 PROCEDURE — 11000001 HC ACUTE MED/SURG PRIVATE ROOM

## 2025-04-30 PROCEDURE — 25000003 PHARM REV CODE 250: Performed by: SURGERY

## 2025-04-30 RX ADMIN — AMIODARONE HYDROCHLORIDE 200 MG: 200 TABLET ORAL at 09:04

## 2025-04-30 RX ADMIN — HYDROCODONE BITARTRATE AND ACETAMINOPHEN 1 TABLET: 7.5; 325 TABLET ORAL at 09:04

## 2025-04-30 RX ADMIN — TRAMADOL HYDROCHLORIDE 50 MG: 50 TABLET, COATED ORAL at 10:04

## 2025-04-30 RX ADMIN — HYDROCODONE BITARTRATE AND ACETAMINOPHEN 1 TABLET: 7.5; 325 TABLET ORAL at 04:04

## 2025-04-30 RX ADMIN — TRAMADOL HYDROCHLORIDE 50 MG: 50 TABLET, COATED ORAL at 02:04

## 2025-04-30 RX ADMIN — METOPROLOL SUCCINATE 100 MG: 50 TABLET, EXTENDED RELEASE ORAL at 10:04

## 2025-04-30 RX ADMIN — AMIODARONE HYDROCHLORIDE 200 MG: 200 TABLET ORAL at 10:04

## 2025-04-30 RX ADMIN — SACUBITRIL AND VALSARTAN 1 TABLET: 24; 26 TABLET, FILM COATED ORAL at 09:04

## 2025-04-30 RX ADMIN — SACUBITRIL AND VALSARTAN 1 TABLET: 24; 26 TABLET, FILM COATED ORAL at 10:04

## 2025-04-30 RX ADMIN — DOCUSATE SODIUM 50 MG: 50 CAPSULE, LIQUID FILLED ORAL at 10:04

## 2025-04-30 RX ADMIN — PANTOPRAZOLE SODIUM 40 MG: 40 TABLET, DELAYED RELEASE ORAL at 10:04

## 2025-04-30 NOTE — OP NOTE
[facility]     Operative Note    SUMMARY     Date of Procedure: 4/30/2025     Procedure:  Exploratory Laparotomy, Excision of skin, subcutaneous tissue, scar, enterolysis, component separation, incisional hernia repair with mesh implant, L Laparoscopic Inguinal Hernia Repair    Surgeons and Role:     * Jean Tierney MD - Primary    Assisting Surgeon: BEATRICE Rogel NP    No qualified resident available to assist.     Pre-Operative Diagnosis: incarcerated incisional hernia, left inguinal hernia    Post-Operative Diagnosis: same    Anesthesia: General     Operative Findings (including complications, if any): large greater than 15 cm incisional hernia, left inguinal hernia    Description of Technical Procedures:  Patient was taken to the operating room and placed on the table in supine position.  The abdomen was prepped and draped in usual fashion after the induction of anesthetic.  Appropriate time-out was performed.  The patient had a very large midline upper ventral incisional wall hernia with skin overlying viscera.  He also had a left inguinal hernia.  Optical tipped trocar was used to access the peritoneal cavity pneumoperitoneum was established periumbilical incision was performed and anterior fascia was incised rectus muscle was retracted laterally a balloon was used to open the extraperitoneal space tissue working ports were placed then in the preperitoneal space and a totally exterior preperitoneal hernia repair was performed.  The appropriate anatomy was identified.  The patient had a large indirect inguinal hernia.  The cord structures were identified and were left undisturbed.  The vessels were all identified and left undisturbed.  Parish's ligament was identified.  A large left 3DMax mesh was placed in the preperitoneal space up against the myopectineal orifice completing the repair.  Hemostasis was assured and the intraperitoneal gas was removed hemoperitoneum was  reestablished and a hernia appeared repaired.  The trocars were removed.  The pneumoperitoneum was released.  The upper midline large ventral incisional hernia was then approached.  The overlying scar and skin was excised.  This was proximally 20 x 30 sq cm.  The skin was excised off of the overlying bowel.  Enterolysis was performed.  After ensuring there was no injury to any viscera and having the viscera were placed inside the abdomen in orderly fashion.  A portion of Parietex mesh was placed intraperitoneally and was sutured was trans fascia muscle fixation sutures after creating subcutaneous flaps and mobilizing the fascia towards the midline.  Laparotomy pad instrument count were correct hemostasis was assured the overlying fascia was closed with a running PDS suture.  Interrupted Ethibond were also utilized.  Hemostasis was assured the wound was closed over a drain the wound was closed with Vicryl and staples.    Significant Surgical Tasks Conducted by the Assistant(s), if Applicable:      Estimated Blood Loss (EBL): * No values recorded between 4/28/2025  9:58 AM and 4/28/2025 11:08 AM *           Implants:  Parietex mesh    Specimens:   Specimen (24h ago, onward)      None           Skin, Soft Tissue, Scar    Condition: Good    Disposition: PACU - hemodynamically stable.    Attestation: Op Note Attestation: I was physically present and scrubbed for the entire procedure.

## 2025-04-30 NOTE — PLAN OF CARE
Problem: Comorbidity Management  Goal: Maintenance of Heart Failure Symptom Control  4/30/2025 1747 by Jacque Mao RN  Outcome: Progressing  4/30/2025 1746 by Jacque Mao RN  Outcome: Progressing     Problem: Fall Injury Risk  Goal: Absence of Fall and Fall-Related Injury  4/30/2025 1747 by Jacque Mao RN  Outcome: Progressing  4/30/2025 1746 by Jacque Mao RN  Outcome: Progressing     Problem: Infection  Goal: Absence of Infection Signs and Symptoms  4/30/2025 1747 by Jacque Mao RN  Outcome: Progressing  4/30/2025 1746 by Jacque Mao RN  Outcome: Progressing     Problem: Pain Acute  Goal: Optimal Pain Control and Function  4/30/2025 1747 by Jacque Mao RN  Outcome: Progressing  4/30/2025 1746 by Jacque Mao RN  Outcome: Progressing     Problem: Diabetes Comorbidity  Goal: Blood Glucose Level Within Targeted Range  4/30/2025 1747 by Jacque Mao RN  Outcome: Progressing  4/30/2025 1746 by Jacque Mao RN  Outcome: Progressing     Problem: Adult Inpatient Plan of Care  Goal: Plan of Care Review  4/30/2025 1747 by Jacque Mao RN  Outcome: Progressing  4/30/2025 1746 by Jacque Mao RN  Outcome: Progressing  Goal: Patient-Specific Goal (Individualized)  4/30/2025 1747 by Jacque Mao RN  Outcome: Progressing  4/30/2025 1746 by Jacque Mao RN  Outcome: Progressing  Goal: Absence of Hospital-Acquired Illness or Injury  4/30/2025 1747 by Jacque Mao RN  Outcome: Progressing  4/30/2025 1746 by Jacque Mao RN  Outcome: Progressing  Goal: Optimal Comfort and Wellbeing  4/30/2025 1747 by Jacque Mao RN  Outcome: Progressing  4/30/2025 1746 by Jacque Mao RN  Outcome: Progressing  Goal: Readiness for Transition of Care  4/30/2025 1747 by Jacque Mao RN  Outcome: Progressing  4/30/2025 1746 by Jacque Mao RN  Outcome: Progressing     Problem: Wound  Goal: Optimal Coping  4/30/2025 1747 by Jacque Mao RN  Outcome: Progressing  4/30/2025 1746 by  Bergeaux, Jacque, RN  Outcome: Progressing  Goal: Optimal Functional Ability  4/30/2025 1747 by Jacque Mao RN  Outcome: Progressing  4/30/2025 1746 by Jacque Mao RN  Outcome: Progressing  Goal: Absence of Infection Signs and Symptoms  4/30/2025 1747 by Jacque Mao RN  Outcome: Progressing  4/30/2025 1746 by Jacque Mao RN  Outcome: Progressing  Goal: Improved Oral Intake  4/30/2025 1747 by Jacque Mao RN  Outcome: Progressing  4/30/2025 1746 by Jacque Mao RN  Outcome: Progressing  Goal: Optimal Pain Control and Function  4/30/2025 1747 by Jacque Mao RN  Outcome: Progressing  4/30/2025 1746 by Jacque Mao RN  Outcome: Progressing  Goal: Skin Health and Integrity  4/30/2025 1747 by Jacque Mao, RN  Outcome: Progressing  4/30/2025 1746 by Jacque Mao RN  Outcome: Progressing  Goal: Optimal Wound Healing  4/30/2025 1747 by Jacque Mao RN  Outcome: Progressing  4/30/2025 1746 by Jacque Mao RN  Outcome: Progressing

## 2025-04-30 NOTE — PT/OT/SLP PROGRESS
Occupational Therapy   Treatment    Name: Tashi Deluna  MRN: [id number]  Admitting Diagnosis:  Incisional hernia of anterior abdominal wall without obstruction or gangrene  2 Days Post-Op    Recommendations:     Recommended therapy intensity at discharge: Moderate Intensity Therapy   Discharge Equipment Recommendations:  to be determined by next level of care  Barriers to discharge:       Assessment:     Tashi Deluna is a 65 y.o. male with a medical diagnosis of Incisional hernia of anterior abdominal wall without obstruction or gangrene.  He presents with improved balance and increased endurance, overall CGA for mobility at this time, recommending Mod intensity therapy pending progress. Performance deficits affecting function are weakness, impaired endurance, impaired self care skills, impaired functional mobility, gait instability, impaired balance, pain.     Rehab Prognosis:  Good; patient would benefit from acute skilled OT services to address these deficits and reach maximum level of function.       Plan:     Patient to be seen 5 x/week to address the above listed problems via self-care/home management, therapeutic activities, therapeutic exercises  Plan of Care Expires: 05/29/25  Plan of Care Reviewed with: patient    Subjective     Pain/Comfort:       Objective:     Communicated with: RN prior to session.  Patient found HOB elevated with   upon OT entry to room.    General Precautions: Standard, fall    Orthopedic Precautions:   Braces:    Respiratory Status: Room air       Occupational Performance:   (Bed Mobility-Min A)  (Sitting balance- SBA)  (LB dressing task- Max A) for donning underwear EOB, would benefit from hip kit in future sessions.   (Sit to stand- Min A) from EOB  Pt. Requiring CGA ambulating from EOB to bathroom using RW for UE support with balance.   Toileting activity- standing at toilet with RW with SBA for safety.   Grooming task- performed at sink while brushing teeth with CGA for balance.      Therapeutic Positioning    OT interventions performed during the course of today's session in an effort to prevent and/or reduce acquired pressure injuries:   Therapeutic positioning was provided at the conclusion of session to offload all bony prominences for the prevention and/or reduction of pressure injuries      [facility] 6 Click ADL:      Patient Education:  Patient provided with verbal education education regarding fall prevention, safety awareness, and pressure ulcer prevention.  Additional teaching is warranted.      Patient left up in chair with all lines intact and call button in reach.    GOALS:   Multidisciplinary Problems       Occupational Therapy Goals          Problem: Occupational Therapy    Goal Priority Disciplines Outcome Interventions   Occupational Therapy Goal     OT, PT/OT Progressing    Description: Goals to be met by: in 1 month      Patient will increase functional independence with ADLs by performing:    LE Dressing with Modified [place].  Grooming while standing at sink with Modified [place].  Toileting from toilet with Modified [place] for hygiene and clothing management.   Toilet transfer to toilet with Modified [place].                         Time Tracking:     OT Date of Treatment: 04/30/25  OT Start Time: 1330  OT Stop Time: 1354  OT Total Time (min): 24 min    Billable Minutes:Self Care/Home Management 2    OT/MITCH: MITCH     Number of MITCH visits since last OT visit: 1    4/30/2025

## 2025-04-30 NOTE — PROGRESS NOTES
Inpatient Nutrition Assessment    Admit Date: 4/23/2025   Total duration of encounter: 7 days   Patient Age: 65 y.o.    Nutrition Recommendation/Prescription     Continue Diet Consistent Carbohydrate 2000 Calories (up to 75 gm per meal) as tolerated.   Trial Boost Glucose Control TID for additional nourishment; provides 250 kcal and 14 gm protein per container.   Monitor wt, labs, and intake.     Communication of Recommendations: reviewed with patient    Nutrition Assessment     Malnutrition Assessment/Nutrition-Focused Physical Exam    Malnutrition Context: chronic illness (04/30/25 1022)  Malnutrition Level: moderate (04/30/25 1022)     Weight Loss (Malnutrition): other (see comments) (Does not meet criteria) (04/30/25 1022)  Subcutaneous Fat (Malnutrition): mild depletion (04/30/25 1022)  Orbital Region (Subcutaneous Fat Loss): mild depletion  Upper Arm Region (Subcutaneous Fat Loss): mild depletion     Muscle Mass (Malnutrition): mild depletion (04/30/25 1022)  [place] Region (Muscle Loss): mild depletion  Clavicle Bone Region (Muscle Loss): mild depletion                             A minimum of two characteristics is recommended for diagnosis of either severe or non-severe malnutrition.    Chart Review    Reason Seen: length of stay    Malnutrition Screening Tool Results   Have you recently lost weight without trying?: No  Have you been eating poorly because of a decreased appetite?: No   MST Score: 0   Diagnosis:  Incisional hernia of anterior abdominal wall without obstruction or gangrene s/p repair    Relevant Medical History:   Atrial fibrillation   CHF (congestive heart failure)   Dysphagia   Fatigue   GERD (gastroesophageal reflux disease)   History of acute pancreatitis   History of anxiety   Incisional hernia of anterior abdominal wall without obstruction or gangrene   Inguinal hernia, bilateral   Neuropathy, diabetic   SOB (shortness of breath)   Type 1 diabetes mellitus       Scheduled  Medications:  amiodarone, 200 mg, BID  docusate sodium, 50 mg, Daily  metoprolol succinate, 100 mg, Daily  pantoprazole, 40 mg, Daily  sacubitriL-valsartan, 1 tablet, BID    Continuous Infusions:  0.9% NaCl, Last Rate: 50 mL/hr at 04/29/25 0957    PRN Medications:  0.9%  NaCl infusion (for blood administration), , Q24H PRN  cloNIDine 0.1 mg/24 hr td ptwk, 1 patch, Once PRN  dextrose 50%, 12.5 g, PRN  diphenhydrAMINE, 25 mg, Q6H PRN  glucagon (human recombinant), 1 mg, PRN  hydrALAZINE, 10 mg, Q4H PRN  HYDROcodone-acetaminophen, 1 tablet, Q6H PRN  hyoscyamine, 0.125 mg, Q4H PRN  insulin aspart U-100, 0-10 Units, Q6H PRN  labetalol, 10 mg, Q2H PRN  morphine, 2 mg, Q2H PRN  ondansetron, 4 mg, Q6H PRN  prochlorperazine, 5 mg, Q6H PRN  promethazine, 12.5 mg, Q6H PRN  traMADoL, 50 mg, Q4H PRN    Calorie Containing IV Medications: no significant kcals from medications at this time    Recent Labs   Lab 04/24/25  0454 04/24/25  1254 04/25/25  0426 04/25/25  1152 04/26/25  0344     --  137  --  136   K 4.6  --  5.0  --  4.5   CALCIUM 8.3*  --  7.6*  --  7.6*     --  104  --  107   CO2 20*  --  22*  --  19*   BUN 12.3  --  34.7*  --  41.1*   CREATININE 0.85  --  1.17  --  1.00   EGFRNORACEVR >60  --  >60  --  >60   *  --  221*  --  200*   BILITOT 3.4*  --  2.6*  --   --    ALKPHOS 84  --  71  --   --    ALT 13  --  11  --   --    AST 18  --  18  --   --    ALBUMIN 3.3*  --  3.1*  --   --    WBC 13.55*  --  10.99  --  10.27   HGB 13.2* 12.6* 10.2* 9.8* 10.4*   HCT 39.9* 38.4* 31.0* 30.0* 32.4*     Nutrition Orders:  Diet Consistent Carbohydrate 2000 Calories (up to 75 gm per meal)      Appetite/Oral Intake: fair/50-75% of meals  Factors Affecting Nutritional Intake:  bloating  Social Needs Impacting Access to Food: none identified  Food/Mu-ism/Cultural Preferences: none reported  Food Allergies: no known food allergies  Last Bowel Movement: 04/22/25  Wound(s):  surgical incision     Comments    4/30/25: Pt  "reports fair intake; pt reports avoiding a lot of foods 2/2 his diabetes. Pt denies n/v; reports usual wt of ~159 lbs.     Anthropometrics    Height: 5' 9" (175.3 cm), Height Method: Stated  Last Weight: 72.8 kg (160 lb 7.9 oz) (04/23/25 1053), Weight Method: Standard Scale  BMI (Calculated): 23.7  BMI Classification: normal (BMI 18.5-24.9)        Ideal Body Weight (IBW), Male: 160 lb     % Ideal Body Weight, Male (lb): 100 %                          Usual Weight Provided By: patient    Wt Readings from Last 5 Encounters:   04/23/25 72.8 kg (160 lb 7.9 oz)   02/20/25 73 kg (161 lb)   12/11/24 72.6 kg (160 lb)   11/14/24 71 kg (156 lb 8.4 oz)   10/15/24 70.1 kg (154 lb 9.6 oz)     Weight Change(s) Since Admission:   Wt Readings from Last 1 Encounters:   04/23/25 1053 72.8 kg (160 lb 7.9 oz)   04/21/25 0957 72.6 kg (160 lb)   Admit Weight: 72.6 kg (160 lb) (04/21/25 0957), Weight Method: Stated    Estimated Needs    Weight Used For Calorie Calculations: 72.8 kg (160 lb 7.9 oz)  Energy Calorie Requirements (kcal): 2184 kcal (30 kcal/kg)  Energy Need Method: Kcal/kg  Weight Used For Protein Calculations: 72.8 kg (160 lb 7.9 oz)  Protein Requirements: 109 gm (1.5g/kg)  Fluid Requirements (mL): 2184 mL  CHO Requirement: 246 gm (45% EEN)     Enteral Nutrition     Patient not receiving enteral nutrition at this time.    Parenteral Nutrition     Patient not receiving parenteral nutrition support at this time.    Evaluation of Received Nutrient Intake    Calories: not meeting estimated needs  Protein: not meeting estimated needs    Patient Education     Not applicable.    Nutrition Diagnosis     PES: Inadequate energy intake related to acute illness as evidenced by pt only eating ~half of meals. (new)     PES: Moderate chronic disease or condition related malnutrition Related to chronic illness As Evidenced by:  - muscle mass depletion: 4 areas of mild muscle loss (Trapezius, Clavicle, Temporalis, Pectoralis) - loss of " subcutaneous fat: 2 areas of mild fat loss (Triceps Skinfold, Infraorbital) new    Nutrition Interventions     Intervention(s): general/healthful diet, commercial beverage, and collaboration with other providers  Intervention(s): Oral nutritional supplement    Goal: Meet greater than 80% of nutritional needs by follow-up. (new)  Goal: Maintain weight throughout hospitalization. (new)    Nutrition Goals & Monitoring     Dietitian will monitor: energy intake and weight  Discharge planning: continue Diabetic diet with Boost Glucose oral supplements  Nutrition Risk/Follow-Up: high (follow-up in 1-4 days)   Please consult if re-assessment needed sooner.

## 2025-04-30 NOTE — ANESTHESIA POSTPROCEDURE EVALUATION
Anesthesia Post Evaluation    Patient: Tashi Deluna    Procedure(s) Performed: Procedure(s) (LRB):  Incision and Drainage (N/A)  INCISION AND DRAINAGE, ABDOMEN    Final Anesthesia Type: general      Patient location during evaluation: PACU  Patient participation: Yes- Able to Participate  Level of consciousness: awake and alert  Post-procedure vital signs: reviewed and stable  Pain management: adequate  Airway patency: patent    PONV status at discharge: No PONV  Anesthetic complications: no      Cardiovascular status: blood pressure returned to baseline  Respiratory status: unassisted  Hydration status: euvolemic  Follow-up not needed.          Vitals Value Taken Time   /71 04/30/25 15:13   Temp 36.7 °C (98.1 °F) 04/30/25 15:13   Pulse 85 04/30/25 15:13   Resp 20 04/30/25 14:46   SpO2 96 % 04/30/25 15:13         Event Time   Out of Recovery 04/28/2025 11:40:00         Pain/Davis Score: Pain Rating Prior to Med Admin: 7 (4/30/2025  2:46 PM)  Pain Rating Post Med Admin: 2 (4/30/2025  5:46 AM)

## 2025-04-30 NOTE — PLAN OF CARE
[facility] sent new referral/clinicals via [employer] to:    1.[facility]-Talked to [facility] with [facility] referral was received and they're looking over paper work.  2. [facility] with [facility] received referral and is working up patient. I notified her  [facility] is patient's first choice.    [facility] completed and pending 142

## 2025-04-30 NOTE — PT/OT/SLP PROGRESS
"Physical Therapy Treatment    Patient Name:  Tashi Deluna   MRN:  [id number]    Recommendations:     Discharge therapy intensity: Moderate Intensity Therapy   Discharge Equipment Recommendations: to be determined by next level of care  Barriers to discharge: Impaired mobility    Assessment:     Tashi Deluna is a 65 y.o. male admitted with a medical diagnosis of surgical repair of abdominal ventral hernia and L inguinal hernia, now with post-op hematoma s/p evac + I&D.  He presents with the following impairments/functional limitations: weakness, impaired endurance, impaired self care skills, impaired functional mobility, gait instability, impaired balance, pain     Pt with HOB elevated and agreeable to PT tx today. O2 removed for duration of session today with pt tolerating well and nsg agreeable to leave on RA. Able to sit EOB at CGA with increased time due to incisional pain. Sit to stand from EOB with RW at min A and was able to stand to pull up mesh underwear with good balance. Pt then ambulated to restroom with RW min A to void while standing with no UE use at CGA. Dynamic standing good to reach across midline to retrieve items to brush teeth. No seated rest break before proceeding to ambulate in hallway x115' with RW min A for safety. Pt then back in room in bedside chair with all needs in reach and in no distress post tx session today.     Rehab Prognosis: Good; patient would benefit from acute skilled PT services to address these deficits and reach maximum level of function.    Recent Surgery: Procedure(s) (LRB):  Incision and Drainage (N/A)  INCISION AND DRAINAGE, ABDOMEN 2 Days Post-Op    Plan:     During this hospitalization, patient would benefit from acute PT services 5 x/week to address the identified rehab impairments via gait training, therapeutic activities, therapeutic exercises and progress toward the following goals:    Plan of Care Expires:  05/29/25    Subjective     Chief Complaint: "Theses " "staples hurt a lot"   Patient/Family Comments/goals:   Pain/Comfort:  Pain Rating 1: other (see comments) (not quantified but stated increased pain in incision)  Location - Orientation 1: generalized  Location 1: abdomen  Pain Addressed 1: Reposition, Distraction      Objective:     Communicated with nsg prior to session.  Patient found HOB elevated with KEESHA drain, peripheral IV, SCD upon PT entry to room.     General Precautions: Standard, fall  Orthopedic Precautions: N/A  Braces: N/A  Respiratory Status: Nasal cannula, flow 2 L/min  Blood Pressure:   Skin Integrity: Visible skin intact      Functional Mobility:  Bed Mobility:     Supine to Sit: contact guard assistance  Transfers:     Sit to Stand:  minimum assistance with rolling walker  Gait: 115' with RW min A with no LOB, hunched posture 2/2 pain     Therapeutic Activities/Exercises:  Dynamic standing x5min to void and brush teeth CGA-min A for safety    Education:  Patient provided with verbal education education regarding PT role/goals/POC, fall prevention, and safety awareness.  Understanding was verbalized.     Patient left up in chair with all lines intact, call button in reach, and nsg notified    GOALS:   Multidisciplinary Problems       Physical Therapy Goals          Problem: Physical Therapy    Goal Priority Disciplines Outcome Interventions   Physical Therapy Goal     PT, PT/OT Progressing    Description: Goals to be met by: 25     Patient will increase functional independence with mobility by performin. Supine to sit with Modified [place]  2. Sit to stand transfer with Modified [place]  3. Bed to chair transfer with Modified [place] using No Assistive Device  4. Gait  x 150 feet with Modified [place] using No Assistive Device.   5. Ascend/descend 3 stair without Handrails Stand-by Assistance                         Time Tracking:     PT Received On: 25  PT Start Time: 1331     PT Stop Time: 1354  PT Total Time " (min): 23 min     Billable Minutes: Gait Training 13 and Therapeutic Activity 10    Treatment Type: Treatment  PT/PTA: PTA     Number of PTA visits since last PT visit: 1 04/30/2025

## 2025-05-01 LAB
GLUCOSE SERPL-MCNC: 248 MG/DL (ref 70–110)
POCT GLUCOSE: 215 MG/DL (ref 70–110)
POCT GLUCOSE: 222 MG/DL (ref 70–110)

## 2025-05-01 PROCEDURE — 99900031 HC PATIENT EDUCATION (STAT)

## 2025-05-01 PROCEDURE — 94760 N-INVAS EAR/PLS OXIMETRY 1: CPT

## 2025-05-01 PROCEDURE — 94799 UNLISTED PULMONARY SVC/PX: CPT

## 2025-05-01 PROCEDURE — 63600175 PHARM REV CODE 636 W HCPCS: Performed by: NURSE PRACTITIONER

## 2025-05-01 PROCEDURE — 99900035 HC TECH TIME PER 15 MIN (STAT)

## 2025-05-01 PROCEDURE — 25000003 PHARM REV CODE 250: Performed by: NURSE PRACTITIONER

## 2025-05-01 PROCEDURE — 97116 GAIT TRAINING THERAPY: CPT | Mod: CQ

## 2025-05-01 PROCEDURE — 11000001 HC ACUTE MED/SURG PRIVATE ROOM

## 2025-05-01 PROCEDURE — 97110 THERAPEUTIC EXERCISES: CPT | Mod: CQ

## 2025-05-01 PROCEDURE — 97535 SELF CARE MNGMENT TRAINING: CPT | Mod: CO

## 2025-05-01 PROCEDURE — 25000003 PHARM REV CODE 250: Performed by: SURGERY

## 2025-05-01 RX ORDER — HYDROCODONE BITARTRATE AND ACETAMINOPHEN 7.5; 325 MG/1; MG/1
1 TABLET ORAL EVERY 6 HOURS PRN
Qty: 16 TABLET | Refills: 0 | Status: SHIPPED | OUTPATIENT
Start: 2025-05-01

## 2025-05-01 RX ADMIN — HYDROCODONE BITARTRATE AND ACETAMINOPHEN 1 TABLET: 7.5; 325 TABLET ORAL at 08:05

## 2025-05-01 RX ADMIN — PANTOPRAZOLE SODIUM 40 MG: 40 TABLET, DELAYED RELEASE ORAL at 09:05

## 2025-05-01 RX ADMIN — INSULIN ASPART 8 UNITS: 100 INJECTION, SOLUTION INTRAVENOUS; SUBCUTANEOUS at 04:05

## 2025-05-01 RX ADMIN — DOCUSATE SODIUM 50 MG: 50 CAPSULE, LIQUID FILLED ORAL at 09:05

## 2025-05-01 RX ADMIN — AMIODARONE HYDROCHLORIDE 200 MG: 200 TABLET ORAL at 08:05

## 2025-05-01 RX ADMIN — INSULIN ASPART 2 UNITS: 100 INJECTION, SOLUTION INTRAVENOUS; SUBCUTANEOUS at 02:05

## 2025-05-01 RX ADMIN — TRAMADOL HYDROCHLORIDE 50 MG: 50 TABLET, COATED ORAL at 02:05

## 2025-05-01 RX ADMIN — SACUBITRIL AND VALSARTAN 1 TABLET: 24; 26 TABLET, FILM COATED ORAL at 08:05

## 2025-05-01 RX ADMIN — SACUBITRIL AND VALSARTAN 1 TABLET: 24; 26 TABLET, FILM COATED ORAL at 09:05

## 2025-05-01 RX ADMIN — AMIODARONE HYDROCHLORIDE 200 MG: 200 TABLET ORAL at 09:05

## 2025-05-01 RX ADMIN — INSULIN ASPART 3 UNITS: 100 INJECTION, SOLUTION INTRAVENOUS; SUBCUTANEOUS at 08:05

## 2025-05-01 RX ADMIN — METOPROLOL SUCCINATE 100 MG: 50 TABLET, EXTENDED RELEASE ORAL at 09:05

## 2025-05-01 NOTE — DISCHARGE INSTRUCTIONS
Dr[facility]'s Post Operative Abdominal Hernia Repair Discharge Instructions:    - No heavy lifting (anything over 10 pounds) or straining for 4 weeks post op.   - No driving for 3 days or as long as taking pain medication.  - Wear abdominal binder as needed for post op comfort.     - Keep surgical dressing clean and dry for 48 hours post op, then ok to remove gauze surgical dressing and shower.  - Wash incision daily with antibacterial soap and water. Pat dry.   - Do not remove steri strips for 5-7 days post op. After post op day 7, ok to remove steri strips once edges of steri strips begin to curl. Do not keep steri strips in place longer than 10 days after surgery.     - Call Dr. [facility]'s office with any questions or concerns regarding wound care/incisions. [phone].      Abdominal Hernia/Ventral Hernia/ Umbilical Hernia    A ventral hernia is a bulge of tissue from inside the abdomen that pushes through a weak area of the muscles that form the front wall of the abdomen. The tissues inside the abdomen are inside a sac (peritoneum). These tissues include the small intestine, large intestine, and the fatty tissue that covers the intestines (omentum). Sometimes, the bulge that forms a hernia contains intestines. Other hernias contain only fat. Ventral hernias do not go away without surgical treatment.  There are several types of ventral hernias. You may have:   A hernia at an incision site from previous abdominal surgery (incisional hernia).   A hernia just above the belly button (epigastric hernia), or at the belly button (umbilical hernia). These types of hernias can develop from heavy lifting or straining.   A hernia that comes and goes (reducible hernia). It may be visible only when you lift or strain. This type of hernia can be pushed back into the abdomen (reduced).   A hernia that traps abdominal tissue inside the hernia (incarcerated hernia). This type of hernia does not reduce.   A hernia that cuts  off blood flow to the tissues inside the hernia (strangulated hernia). The tissues can start to die if this happens. This is a very painful bulge that cannot be reduced.   A strangulated hernia is a medical emergency.  What are the causes?  This condition is caused by abdominal tissue putting pressure on an area of weakness in the abdominal muscles.  What increases the risk?  The following factors may make you more likely to develop this condition:   Being age 60 or older.   Being overweight or obese.   Having had previous abdominal surgery, especially if there was an infection after surgery.   Having had an injury to the abdominal wall.   Frequently lifting or pushing heavy objects.   Having had several pregnancies.   Having a buildup of fluid inside the abdomen (ascites).   Straining to have a bowel movement or to urinate.   Having frequent coughing episodes.  What are the signs or symptoms?  The only symptom of a ventral hernia may be a painless bulge in the abdomen. A reducible hernia may be visible only when you strain, cough, or lift. Other symptoms may include:   Dull pain.   A feeling of pressure.  Signs and symptoms of a strangulated hernia may include:   Increasing pain.   Nausea and vomiting.   Pain when pressing on the hernia.   The skin over the hernia turning red or purple.   Constipation.   Blood in the stool (feces).  How is this treated?  This condition is treated with surgery. If you have a strangulated hernia, surgery is done as soon as possible. If your hernia is small and not incarcerated, you may be asked to lose some weight before surgery.    Document Revised: 08/06/2021 Document Reviewed: 08/06/2021  [email or web address] Patient Education © 2021 [employer]    Laparoscopic Abdomina/Ventral Hernia Repair, Care After  This sheet gives you information about how to care for yourself after your procedure. Your health care provider may also give you more specific instructions. If you have problems or  questions, contact your health care provider.  What can I expect after the procedure?  After the procedure, it is common to have:   Pain, discomfort, or soreness.  Follow these instructions at home:  Medicines   Take over-the-counter and prescription medicines only as told by your health care provider.   Ask your health care provider if the medicine prescribed to you:  ? Requires you to avoid driving or using machinery.  ? Can cause constipation. You may need to take these actions to prevent or treat constipation:  ? Take over-the-counter or prescription medicines.  ? Eat foods that are high in fiber, such as beans, whole grains, and fresh fruits and vegetables.  ? Limit foods that are high in fat and processed sugars, such as fried or sweet foods.  Incision care     Follow instructions from your health care provider about how to take care of your incisions. Make sure you:  ? Wash your hands with soap and water for at least 20 seconds before and after you change your bandage (dressing) or before you touch your abdomen. If soap and water are not available, use hand .  ? Change your dressing as told by your health care provider.     Check your incision areas every day for signs of infection. Check for:  ? More redness, swelling, or pain.  ? Fluid or blood.  ? Warmth.  ? Pus or a bad smell.  Bathing     Do not take baths, swim, or use a hot tub until cleared by your surgeon. Ok to shower.    Keep your dressing dry until your health care provider says it can be removed.  Activity     Rest as told by your health care provider.   Avoid sitting for a long time without moving. Get up to take short walks every 1-2 hours. This is important to improve blood flow and breathing. Ask for help if you feel weak or unsteady.   Do not lift anything that is heavier than 10 lb (4.5 kg), or the limit that you are told, until your health care provider says that it is safe.   If you were given a sedative during the procedure, it  can affect you for several hours. Do not drive or operate machinery until your health care provider says that it is safe.   Return to your normal activities as told by your health care provider. Ask your health care provider what activities are safe for you.  General instructions     Drink enough fluid to keep your urine pale yellow.   Hold a pillow over your abdomen when you cough or sneeze. This helps with pain.   Do not use any products that contain nicotine or tobacco, such as cigarettes, e-cigarettes, and chewing tobacco. These can delay incision healing after surgery. If you need help quitting, ask your health care provider.   You may be asked to continue to do deep breathing exercises at home. This will help to prevent a lung infection.   Keep all follow-up visits as told by your health care provider. This is important.  Contact a health care provider if:   You have any of these signs of infection:  ? More redness, swelling, or pain around an incision.  ? Fluid or blood coming from an incision.  ? Warmth coming from an incision.  ? Pus or a bad smell coming from an incision.  ? A fever or chills.   You have pain that gets worse or does not get better with medicine.   You have nausea or vomiting.   You have a cough.   You have shortness of breath.   You have not had a bowel movement in 3 days.   You are not able to urinate.  Get help right away if you have:   Severe pain in your abdomen.   Persistent nausea and vomiting.   Redness, warmth, or pain in your leg.   Chest pain.   Trouble breathing.  These symptoms may represent a serious problem that is an emergency. Do not wait to see if the symptoms will go away. Get medical help right away. Call your local emergency services (911 in the [place] Do not drive yourself to the hospital.  Summary   After this procedure, it is common to have pain, discomfort, or soreness.   Follow instructions from your health care provider about how to take care of your  incision.   Check your incision area every day for signs of infection. Report any signs of infection to your health care provider.   Keep all follow-up visits as told by your health care provider. This is important.  This information is not intended to replace advice given to you by your health care provider. Make sure you discuss any questions you have with your health care provider.  Document Revised: 12/02/2020 Document Reviewed: 12/03/2020  [employer] Patient Education © 2021 [employer]    How to Prevent Constipation After Surgery  Constipation is a common problem after surgery. Many things can make constipation more likely after a surgery, including:   Certain medicines, especially numbing medicines (anesthetics) and very strong pain medicines called opioids.   Feeling stressed because of the surgery.   Eating different foods than normal.   Being less active.  Symptoms of constipation include:   Having fewer than three bowel movements a week.   Straining to have a bowel movement.   Having hard, dry, or larger-than-normal stools (feces).   Discomfort in the lower abdomen, such as cramps or bloating.   Not feeling relief after having a bowel movement.   Nausea and vomiting.  You can take steps to help prevent constipation after surgery.  Follow these instructions at home:  Eating and drinking     Eat foods that have a lot of fiber in them, such as beans, bran, whole grains, and fresh fruits and vegetables.   Limit foods that are high in fat and processed sugars, such as fried or sweet foods. These include french fries, hamburgers, cookies, and candy.   Take a fiber supplement as told by your health care provider. If you are not taking a fiber supplement and you think you are not getting enough fiber from foods, talk to your health care provider about adding a fiber supplement to your diet.   Drink enough fluid to keep your urine pale yellow.   Drink clear fluids, especially water. Avoid drinking alcohol,  caffeine, and soda. These can make constipation worse.  Activity     After surgery, return to your normal activities slowly, or when your health care provider says it is okay.   Start walking as soon as you can. Try to go a little farther each day.   Once your health care provider approves, do some sort of regular exercise. This helps prevent constipation.  Bowel movements   Go to the restroom when you have the urge to go. Do not hold it in.   Try drinking something hot to get a bowel movement started.   Keep track of how often you use the restroom.  Medicines   Take over-the-counter and prescription medicines only as told by your health care provider.   Talk to your health care provider about medicines that may help prevent constipation, particularly if you have a history of constipation. Your health care provider may suggest a stool softener, laxative, or fiber supplement.   Do not take any medicines without talking to your health care provider first.  Contact a health care provider if:   You used stool softeners or laxatives and still have not had a bowel movement within 24-48 hours after using them.   You have not had a bowel movement in 3 days.   You have a fever.  Get help right away if you have:   Constipation that lasts for more than 4 days or if your symptoms get worse.   Bright red blood in your stool.   Pain in the abdomen or rectum.   Very bad cramping.   Thin, pencil-like stools.   Unexplained weight loss.  Summary   Constipation is a common problem after surgery. Many things can make constipation more likely after a surgery, including certain medicines, eating different foods than normal, and being less active.   Symptoms of constipation include having fewer than three bowel movements a week, straining to have a bowel movement, and cramps or bloating in the lower abdomen.   To help prevent constipation, you should eat foods that are high in fiber, drink plenty of fluids, and get regular physical  activity.   Your health care provider may suggest medicines, such as stool softeners or laxatives, to help prevent constipation.  This information is not intended to replace advice given to you by your health care provider. Make sure you discuss any questions you have with your health care provider.    Document Revised: 11/04/2020 Document Reviewed: 11/04/2020  [employer] Patient Education © 2021 [employer].

## 2025-05-01 NOTE — PT/OT/SLP PROGRESS
Physical Therapy Treatment    Patient Name:  Tashi Deluna   MRN:  [id number]    Recommendations:     Discharge therapy intensity: Moderate Intensity Therapy   Discharge Equipment Recommendations: to be determined by next level of care  Barriers to discharge: Impaired mobility    Assessment:     Tashi Deluna is a 65 y.o. male admitted with a medical diagnosis of surgical repair of abdominal ventral hernia and L inguinal hernia, now with post-op hematoma s/p evac + I&D.  He presents with the following impairments/functional limitations: weakness, impaired endurance, impaired self care skills, impaired functional mobility, gait instability, impaired balance, pain     Pt sitting up in bedside chair stating that he has already been to the restroom this morning and was able to have a BM. Pt is agreeable to PT tx today. Able to stand from bedside chair at CGA level with RW before ambulating in hallway x110ft with RW at [facility] but does req constant cueing for upright posture. Pt back in bedside chair with geomat reporting increased comfort with it. Pt able to assist in changing gown while seated. Performed BLE therex while seated. Pt then left with all needs in reach on phone with [facility].     Rehab Prognosis: Good; patient would benefit from acute skilled PT services to address these deficits and reach maximum level of function.    Recent Surgery: Procedure(s) (LRB):  Incision and Drainage (N/A)  INCISION AND DRAINAGE, ABDOMEN 3 Days Post-Op    Plan:     During this hospitalization, patient would benefit from acute PT services 5 x/week to address the identified rehab impairments via gait training, therapeutic activities, therapeutic exercises and progress toward the following goals:    Plan of Care Expires:  05/29/25    Subjective     Chief Complaint:   Patient/Family Comments/goals:   Pain/Comfort:  Pain Rating 1: 5/10  Location - Orientation 1: upper  Location 1: abdomen  Pain Addressed 1: Reposition, Distraction, Nurse  notified  Pain Rating Post-Intervention 1: 5/10      Objective:     Communicated with nsg prior to session.  Patient found up in chair with peripheral IV, KEESHA drain upon PT entry to room.     General Precautions: Standard, fall  Orthopedic Precautions: N/A  Braces: N/A  Respiratory Status: Room air  Blood Pressure:   Skin Integrity: Visible skin intact      Functional Mobility:  Transfers:     Sit to Stand:  contact guard assistance with rolling walker  Gait: 110ft with RW CGA with cueing for upright posture throughout.     Therapeutic Activities/Exercises:  Heel/toe raises 2x10  Hip Abd/add 2x10  LAQ 2x10  Marching 2x10    Education:  Patient provided with verbal education education regarding PT role/goals/POC, fall prevention, and safety awareness.  Understanding was verbalized.     Patient left up in chair with all lines intact, call button in reach, geomat cushion, and nsg notified    GOALS:   Multidisciplinary Problems       Physical Therapy Goals          Problem: Physical Therapy    Goal Priority Disciplines Outcome Interventions   Physical Therapy Goal     PT, PT/OT Progressing    Description: Goals to be met by: 25     Patient will increase functional independence with mobility by performin. Supine to sit with Modified Wirt  2. Sit to stand transfer with Modified Wirt  3. Bed to chair transfer with Modified Wirt using No Assistive Device  4. Gait  x 150 feet with Modified Wirt using No Assistive Device.   5. Ascend/descend 3 stair without Handrails Stand-by Assistance                         Time Tracking:     PT Received On: 25  PT Start Time: 910     PT Stop Time: 936  PT Total Time (min): 26 min     Billable Minutes: Gait Training 18 and Therapeutic Exercise 8    Treatment Type: Treatment  PT/PTA: PTA     Number of PTA visits since last PT visit: 2     2025

## 2025-05-01 NOTE — PROGRESS NOTES
Inpatient Nutrition Assessment    Admit Date: 4/23/2025   Total duration of encounter: 8 days   Patient Age: 65 y.o.    Nutrition Recommendation/Prescription     Continue Diet Consistent Carbohydrate 2000 Calories (up to 75 gm per meal) and add Soft and Bite Sized modifier to diet order.  Consider consulting SLP if pt continues to report difficulty swallowing certain foods.   Continue Boost Glucose Control TID for additional nourishment; provides 250 kcal and 14 gm protein per container.   Monitor wt, labs, and intake.     Communication of Recommendations: reviewed with nurse    Nutrition Assessment     Malnutrition Assessment/Nutrition-Focused Physical Exam    Malnutrition Context: chronic illness (04/30/25 1022)  Malnutrition Level: moderate (04/30/25 1022)     Weight Loss (Malnutrition): other (see comments) (Does not meet criteria) (04/30/25 1022)  Subcutaneous Fat (Malnutrition): mild depletion (04/30/25 1022)  Orbital Region (Subcutaneous Fat Loss): mild depletion  Upper Arm Region (Subcutaneous Fat Loss): mild depletion     Muscle Mass (Malnutrition): mild depletion (04/30/25 1022)  [place] Region (Muscle Loss): mild depletion  Clavicle Bone Region (Muscle Loss): mild depletion                             A minimum of two characteristics is recommended for diagnosis of either severe or non-severe malnutrition.    Chart Review    Reason Seen: length of stay and follow-up    Malnutrition Screening Tool Results   Have you recently lost weight without trying?: No  Have you been eating poorly because of a decreased appetite?: No   MST Score: 0   Diagnosis:  Incisional hernia of anterior abdominal wall without obstruction or gangrene s/p repair    Relevant Medical History:   Atrial fibrillation   CHF (congestive heart failure)   Dysphagia   Fatigue   GERD (gastroesophageal reflux disease)   History of acute pancreatitis   History of anxiety   Incisional hernia of anterior abdominal wall without obstruction or  gangrene   Inguinal hernia, bilateral   Neuropathy, diabetic   SOB (shortness of breath)   Type 1 diabetes mellitus       Scheduled Medications:  amiodarone, 200 mg, BID  docusate sodium, 50 mg, Daily  metoprolol succinate, 100 mg, Daily  pantoprazole, 40 mg, Daily  sacubitriL-valsartan, 1 tablet, BID    Continuous Infusions:     PRN Medications:  0.9%  NaCl infusion (for blood administration), , Q24H PRN  cloNIDine 0.1 mg/24 hr td ptwk, 1 patch, Once PRN  dextrose 50%, 12.5 g, PRN  diphenhydrAMINE, 25 mg, Q6H PRN  glucagon (human recombinant), 1 mg, PRN  hydrALAZINE, 10 mg, Q4H PRN  HYDROcodone-acetaminophen, 1 tablet, Q6H PRN  hyoscyamine, 0.125 mg, Q4H PRN  insulin aspart U-100, 0-10 Units, Q6H PRN  labetalol, 10 mg, Q2H PRN  morphine, 2 mg, Q2H PRN  ondansetron, 4 mg, Q6H PRN  prochlorperazine, 5 mg, Q6H PRN  promethazine, 12.5 mg, Q6H PRN  traMADoL, 50 mg, Q4H PRN    Calorie Containing IV Medications: no significant kcals from medications at this time    Recent Labs   Lab 04/24/25  1254 04/25/25  0426 04/25/25  1152 04/26/25  0344   NA  --  137  --  136   K  --  5.0  --  4.5   CALCIUM  --  7.6*  --  7.6*   CL  --  104  --  107   CO2  --  22*  --  19*   BUN  --  34.7*  --  41.1*   CREATININE  --  1.17  --  1.00   EGFRNORACEVR  --  >60  --  >60   GLU  --  221*  --  200*   BILITOT  --  2.6*  --   --    ALKPHOS  --  71  --   --    ALT  --  11  --   --    AST  --  18  --   --    ALBUMIN  --  3.1*  --   --    WBC  --  10.99  --  10.27   HGB 12.6* 10.2* 9.8* 10.4*   HCT 38.4* 31.0* 30.0* 32.4*     Nutrition Orders:  Diet Consistent Carbohydrate 2000 Calories (up to 75 gm per meal)  Dietary nutrition supplements TID; Boost Glucose Control - Vanilla    Appetite/Oral Intake: fair/50-75% of meals  Factors Affecting Nutritional Intake: difficulty/impaired swallowing and bloating  Social Needs Impacting Access to Food: none identified  Food/Hindu/Cultural Preferences: none reported  Food Allergies: no known food  "allergies  Last Bowel Movement: 05/01/25  Wound(s):  surgical incision     Comments    4/30/25: Pt reports fair intake; pt reports avoiding a lot of foods 2/2 his diabetes. Pt denies n/v; reports usual wt of ~159 lbs.     5/1/25: Per RN, pt is doing well with meals and tolerating; reports that pt is reporting some difficulty with eating foods that are not cut up small so will add soft and bite sized modifier to diet order.     Anthropometrics    Height: 5' 9" (175.3 cm), Height Method: Stated  Last Weight: 72.8 kg (160 lb 7.9 oz) (04/23/25 1053), Weight Method: Standard Scale  BMI (Calculated): 23.7  BMI Classification: normal (BMI 18.5-24.9)        Ideal Body Weight (IBW), Male: 160 lb     % Ideal Body Weight, Male (lb): 100 %                          Usual Weight Provided By: patient    Wt Readings from Last 5 Encounters:   04/23/25 72.8 kg (160 lb 7.9 oz)   02/20/25 73 kg (161 lb)   12/11/24 72.6 kg (160 lb)   11/14/24 71 kg (156 lb 8.4 oz)   10/15/24 70.1 kg (154 lb 9.6 oz)     Weight Change(s) Since Admission:   Wt Readings from Last 1 Encounters:   04/23/25 1053 72.8 kg (160 lb 7.9 oz)   04/21/25 0957 72.6 kg (160 lb)   Admit Weight: 72.6 kg (160 lb) (04/21/25 0957), Weight Method: Stated    Estimated Needs    Weight Used For Calorie Calculations: 72.8 kg (160 lb 7.9 oz)  Energy Calorie Requirements (kcal): 2184 kcal (30 kcal/kg)  Energy Need Method: Kcal/kg  Weight Used For Protein Calculations: 72.8 kg (160 lb 7.9 oz)  Protein Requirements: 109 gm (1.5g/kg)  Fluid Requirements (mL): 2184 mL  CHO Requirement: 246 gm (45% EEN)     Enteral Nutrition     Patient not receiving enteral nutrition at this time.    Parenteral Nutrition     Patient not receiving parenteral nutrition support at this time.    Evaluation of Received Nutrient Intake    Calories: not meeting estimated needs  Protein: not meeting estimated needs    Patient Education     Not applicable.    Nutrition Diagnosis     PES: Inadequate energy " intake related to acute illness as evidenced by pt only eating ~half of meals. (active)     PES: Moderate chronic disease or condition related malnutrition Related to chronic illness As Evidenced by:  - muscle mass depletion: 4 areas of mild muscle loss (Trapezius, Clavicle, Temporalis, Pectoralis) - loss of subcutaneous fat: 2 areas of mild fat loss (Triceps Skinfold, Infraorbital) active    Nutrition Interventions     Intervention(s): modified composition of meals/snacks, commercial beverage, and collaboration with other providers  Intervention(s): Oral nutritional supplement    Goal: Meet greater than 80% of nutritional needs by follow-up. (goal progressing)  Goal: Maintain weight throughout hospitalization. (goal progressing)    Nutrition Goals & Monitoring     Dietitian will monitor: energy intake and weight  Discharge planning: continue Diabetic diet with Boost Glucose oral supplements; add soft and bite sized modifier to diet order  Nutrition Risk/Follow-Up: high (follow-up in 1-4 days)   Please consult if re-assessment needed sooner.

## 2025-05-01 NOTE — PLAN OF CARE
MYLES talked to Princess with [facility]. They've clinically accepted patient for SNF. Awaiting doctor to make final decision for plan of care.

## 2025-05-01 NOTE — DISCHARGE SUMMARY
[facility] Med Surg  General Surgery  Discharge Summary      Patient Name: Tashi Deluna  MRN: [id number]  Admission Date: 4/23/2025  Hospital Length of Stay: 8 days  Discharge Date and Time: 05/01/2025 11:26 AM  Attending Physician: Jean Tierney MD   Discharging Provider: MARSHA Ramos  Primary Care Provider: Guevara Torres MD     YOB: 1960  Author: MARSHA Ramos   Date: 05/01/2025      Discharge date : 05/01/2025    Admit Diagnosis:   Postoperative hematoma of subcutaneous tissue following non-dermatologic procedure (open incisional hernia repair and lap LIH repair 4/23/25)  Acute blood loss anemia from # 1  Physical deconditioning    Discharge Diagnosis:   same    Operations During Hospitalization: Evacuation of midline abdominal wall hematoma   Date of Surgery: 4/28/2025  Surgeon: Dr. Jean Tierney     Interval History:    POD# 3 S/P Evacuation of hematoma midline abdomen ([facility])     POD# 8 S/POPEN INCISIONAL HERNIA REPAIR WITH COMPONENT SEPARATION WITH PLACEMENT OF 20 X 30 CM PARIETEX MESH  LAPAROSCOPY, DIAGNOSTIC   REPAIR, HERNIA, INGUINAL, LAPAROSCOPIC (Left) ([facility])     AFVSS.      Pt tolerating regular ADA diet without issues. Ambulating with PT, doing well with this. +  BM this morning. Incisional discomfort moderate, controlled with oral pain med, worse with movement.      KEESHA output x 24 hours 30 cc (reported per nursing staff)     NS@ 100 cc/hr     -200 this AM.     Hospital Course:  Mr. Tashi Deluna is a 65 y.o. male  that presented to [facility] on April 23, 2025 for an elective open incisional hernia repair placement of intra-abdominal mesh and laparoscopic left inguinal hernia repair.  Patient tolerated procedure well and was kept for observation.  He developed a significant postoperative subcutaneous hematoma to his abdominal wall midline and had a drop in his hematocrit on postop day 2 to 31 from 39.9  pre op.  Short-term recheck hematocrit drifted down to 30 on postop day 2.  2 units packed red blood cells typed and crossed and administered on April 25, 2025.  On postop day 3 he was transferred to [facility] for higher level of care.  Due to large abdominal wall hematoma Dr. Tierney recommended evacuation of hematoma to ensure no continued active bleeding was present.  Patient had evacuation of hematoma, subcutaneous washout/irrigation, and evacuation of old clot performed on April 28, 2025.  There was no active bleeding noted at the time of surgery and KEESHA drain was replaced.  Patient was then transferred to the recovery room and then to the postoperative for floor for further care and treatment.  Patient's diet was slowly advanced to diabetic regular diet.  Once patient voiding and having bowel movements and ambulating without difficulty he was prepared for discharge to rehab facility.  KEESHA drain removed today on 05/01/2025 by nursing staff.  Case management worked with insurance and has received approval for rehab stay at [facility] in [place].      Review of Systems: Mild incisional pain reported but tolerable.  No nausea, vomiting, dysphagia, GERD. Patient ambulating in the room/hallway and voiding without difficulty. + BM. Patient denies any dizziness, palpitations, SOB, or CP. All other review of systems are negative.     Physical Examination:   Vital signs: stable, noted in chart  General: Awake and alert, able to answer all questions. Resting in bed with family member at bedside.  Respiratory:  Clear to auscultation bilaterally  Cardio: Regular rate and rhythm.  Abdomen: Soft, non distended. Bowel sounds present to all 4 quadrants. Post op hematoma to midline, left groin, penis, scrotum, and left lateral hip stable and resolving. Abdomen benign. Incision to midline and lap sites C/D/I with staples.  Neuro: Alert and oriented x's 4.    Consults: PT/OT for physical  deconditioning  Consults (From admission, onward)          Status Ordering Provider     Inpatient consult to Social Work/Case Management  Once        Provider:  (Not yet assigned)    Completed JEAN RODRIGUEZ     Inpatient consult to Social Work/Case Management  Once        Provider:  (Not yet assigned)    Completed SIDDHARTH ESCAMILLA            Pending Diagnostic Studies:       Procedure Component Value Units Date/Time    CBC Auto Differential [[id number]]     Order Status: Sent Lab Status: No result     Specimen: Blood     Narrative:      The following orders were created for panel order CBC Auto Differential.  Procedure                               Abnormality         Status                     ---------                               -----------         ------                     CBC with Differential[[id number]]                                                        Please view results for these tests on the individual orders.    CBC with Differential [[id number]]     Order Status: Sent Lab Status: No result     Specimen: Blood           Final Active Diagnoses:    Diagnosis Date Noted POA    PRINCIPAL PROBLEM:  Incisional hernia of anterior abdominal wall without obstruction or gangrene [K43.2] 12/05/2023 Yes    Moderate malnutrition [E44.0] 04/30/2025 Yes    Postoperative hematoma of subcutaneous tissue following non-dermatologic procedure [L76.32] 04/25/2025 No    Acute blood loss anemia [D62] 04/25/2025 No    Left inguinal hernia [K40.90] 01/04/2024 Yes      Problems Resolved During this Admission:      Discharged Condition: good    Disposition: Rehab Facility    Follow Up:   Follow-up Information       Jean Rodriguez MD Follow up in 1 week(s).    Specialties: General Surgery, Bariatrics  Why: Follow up in 1 week in office for staple removal  Contact information:  [address]  [address] [address]  [phone]                           Patient Instructions:   No discharge procedures on  file.  Medications:  Reconciled Home Medications:      Medication List        START taking these medications      HYDROcodone-acetaminophen 7.5-325 mg per tablet  Commonly known as: NORCO  Take 1 tablet by mouth every 6 (six) hours as needed for Pain.            CONTINUE taking these medications      amiodarone 400 MG tablet  Commonly known as: PACERONE  Take 0.5 tablets (200 mg total) by mouth 2 (two) times daily. Amiodarone 200mg tablets 400mg twice daily x 3 days then 200mg twice daily x 3 days then 200mg daily     BENADRYL ALLERGY 25 mg tablet  Generic drug: diphenhydrAMINE  Take 25 mg by mouth daily as needed for Allergies.     ELIQUIS 5 mg Tab  Generic drug: apixaban  Take 5 mg by mouth 2 (two) times daily.     ENTRESTO 24-26 mg per tablet  Generic drug: sacubitriL-valsartan  Take 1 tablet by mouth 2 (two) times daily.     famotidine 10 MG tablet  Commonly known as: PEPCID  Take 10 mg by mouth 2 (two) times daily.     insulin regular 100 unit/mL injection  Inject into the skin 3 (three) times daily before meals. Per sliding scale     LANTUS U-100 INSULIN 100 unit/mL injection  Generic drug: insulin glargine U-100 (Lantus)  Inject 10 Units into the skin Daily. Per sliding scale     magnesium oxide 500 mg magnesium Tab  Take 1 tablet by mouth once daily.     metoprolol succinate 100 MG 24 hr tablet  Commonly known as: TOPROL-XL  Take 100 mg by mouth once daily.     pantoprazole 40 MG tablet  Commonly known as: PROTONIX  Take 40 mg by mouth once daily.     rosuvastatin 20 MG tablet  Commonly known as: CRESTOR  Take 20 mg by mouth every evening.     VITAMIN B-12 ORAL  Take by mouth.     vitamin D 1000 units Tab  Commonly known as: VITAMIN D3  Take 1,000 Units by mouth once daily.     VITAMIN D ORAL  Take by mouth.            Acute blood loss anemia s/t subcutaneous post op hematoma. Hemodynamically stable at present.   Physical deconditioning s/t recent surgery. [facility] [facility] [place] [place] [facility]  [place].   Postoperative hematoma following open incisional hernia repair, stable s/p evacuation of hematoma.     - Follow up in 1 week with Dr. Jean Tierney in office for staple removal  - Discontinue KEESHA drain today prior to discharge to rehab  - Continue IS at home  - Walk 20min daily   - Monitor CBGs and BP while at home and contact surgeons office or PCP if any issues  - Discharge instructions reviewed with patient prior to discharge. Printed handouts provided regarding post op care/diet/medications.   - Discharge medication reconciliation completed. Ok to resume home med of Eliquis tomorrow.   - Rx Norco provided prn post op pain      Ana María Rogel, ANP  General Surgery  [facility] - 8th Floor Med Surg

## 2025-05-01 NOTE — PLAN OF CARE
Problem: Comorbidity Management  Goal: Maintenance of Heart Failure Symptom Control  5/1/2025 1217 by Jacque Mao RN  Outcome: Adequate for Care Transition  5/1/2025 0923 by Jacque Mao RN  Outcome: Progressing     Problem: Fall Injury Risk  Goal: Absence of Fall and Fall-Related Injury  5/1/2025 1217 by Jacque Mao RN  Outcome: Adequate for Care Transition  5/1/2025 0923 by Jacque Mao RN  Outcome: Progressing     Problem: Infection  Goal: Absence of Infection Signs and Symptoms  5/1/2025 1217 by Jacque Mao RN  Outcome: Adequate for Care Transition  5/1/2025 0923 by Jacque Mao RN  Outcome: Progressing     Problem: Pain Acute  Goal: Optimal Pain Control and Function  5/1/2025 1217 by Jacque Mao RN  Outcome: Adequate for Care Transition  5/1/2025 0923 by Jacque Mao RN  Outcome: Progressing     Problem: Diabetes Comorbidity  Goal: Blood Glucose Level Within Targeted Range  5/1/2025 1217 by Jacque Mao RN  Outcome: Adequate for Care Transition  5/1/2025 0923 by Jacque Mao RN  Outcome: Progressing     Problem: Adult Inpatient Plan of Care  Goal: Plan of Care Review  5/1/2025 1217 by Jacque Mao RN  Outcome: Adequate for Care Transition  5/1/2025 0923 by Jacque Mao RN  Outcome: Progressing  Goal: Patient-Specific Goal (Individualized)  5/1/2025 1217 by Jacque Mao RN  Outcome: Adequate for Care Transition  5/1/2025 0923 by Jacque Mao RN  Outcome: Progressing  Goal: Absence of Hospital-Acquired Illness or Injury  5/1/2025 1217 by Jacque Mao RN  Outcome: Adequate for Care Transition  5/1/2025 0923 by Jacque Mao RN  Outcome: Progressing  Goal: Optimal Comfort and Wellbeing  5/1/2025 1217 by Jacque Mao RN  Outcome: Adequate for Care Transition  5/1/2025 0923 by Jacque Mao RN  Outcome: Progressing  Goal: Readiness for Transition of Care  5/1/2025 1217 by Bergeaux, Jacque, RN  Outcome: Adequate for Care Transition  5/1/2025 0923 by Mayco,  MARVA Santillan  Outcome: Progressing     Problem: Wound  Goal: Optimal Coping  5/1/2025 1217 by Jacque Mao RN  Outcome: Adequate for Care Transition  5/1/2025 0923 by Jacque Mao RN  Outcome: Progressing  Goal: Optimal Functional Ability  5/1/2025 1217 by Jacque Mao RN  Outcome: Adequate for Care Transition  5/1/2025 0923 by Jacque Mao RN  Outcome: Progressing  Goal: Absence of Infection Signs and Symptoms  5/1/2025 1217 by Jacque Mao RN  Outcome: Adequate for Care Transition  5/1/2025 0923 by Jacque Mao RN  Outcome: Progressing  Goal: Improved Oral Intake  5/1/2025 1217 by Jacque Mao RN  Outcome: Adequate for Care Transition  5/1/2025 0923 by Jacque Mao RN  Outcome: Progressing  Goal: Optimal Pain Control and Function  5/1/2025 1217 by Jacque Mao RN  Outcome: Adequate for Care Transition  5/1/2025 0923 by Jacque Mao RN  Outcome: Progressing  Goal: Skin Health and Integrity  5/1/2025 1217 by Jacque Mao RN  Outcome: Adequate for Care Transition  5/1/2025 0923 by Jacque Mao RN  Outcome: Progressing  Goal: Optimal Wound Healing  5/1/2025 1217 by Jacque Mao RN  Outcome: Adequate for Care Transition  5/1/2025 0923 by Jacque Mao RN  Outcome: Progressing

## 2025-05-01 NOTE — PLAN OF CARE
Received a call from Princess at [facility], they are not ready to accept pt today as previously planned. Will plan to dc tomorrow AM. Princess will call pt's brother to sign consents and she'll notify him in change of dc date. SSC aware.   Left message with Clarita at Dr. Tierney's office for Ana María Rogel NP to notify her that dc was cancelled. Nurse notified.

## 2025-05-01 NOTE — PLAN OF CARE
05/01/25 1107   Final Note   Assessment Type Final Discharge Note   Anticipated Discharge Disposition SNF   What phone number can be called within the next 1-3 days to see how you are doing after discharge? [phone]   Post-Acute Status   Post-Acute Authorization Placement   Post-Acute Placement Status Set-up Complete/Auth obtained   Discharge Delays None known at this time     Pt being discharged today to [facility] via facility van. Pt's brother, Juan Ramon, notified of dc.   Clarita (answering service) at [facility] notified of dc. No further dc needs at this time.

## 2025-05-01 NOTE — PT/OT/SLP PROGRESS
"Occupational Therapy   Treatment    Name: Tashi Deluna  MRN: [id number]  Admitting Diagnosis:  Incisional hernia of anterior abdominal wall without obstruction or gangrene  3 Days Post-Op    Recommendations:     Recommended therapy intensity at discharge: Moderate Intensity Therapy   Discharge Equipment Recommendations:  to be determined by next level of care  Barriers to discharge:       Assessment:     Tashi Deluna is a 65 y.o. male with a medical diagnosis of Incisional hernia of anterior abdominal wall without obstruction or gangrene.  He presents with pleasant demeanor, good reception to education, and reports of planned d/c to SNF tomorrow. Performance deficits affecting function are weakness, impaired endurance, impaired self care skills, impaired functional mobility, gait instability, impaired balance, pain.     Rehab Prognosis:  Good; patient would benefit from acute skilled OT services to address these deficits and reach maximum level of function.       Plan:     Patient to be seen 5 x/week to address the above listed problems via self-care/home management, therapeutic activities, therapeutic exercises  Plan of Care Expires: 05/29/25  Plan of Care Reviewed with: patient    Subjective     Pain/Comfort:  Pain Rating 1: 5/10  Location - Orientation 1: upper  Location 1: abdomen  Pain Addressed 1: Distraction (Had meds ~1500)    Objective:     Communicated with: Nurse prior to session.  Patient found up in chair with peripheral IV, KEESHA drain upon OT entry to room.    General Precautions: Standard, fall    Orthopedic Precautions:   Braces:    Respiratory Status: Room air  Vital Signs: Blood Pressure: 125/79  HR: 84 bpm  Sp02: 96%     Occupational Performance:       Activities of Daily Living:  Patient up in chair, willing to participate  Patient discusses recent medical history and family support  Patient states he is d/c'ing to SNF tomorrow and is eager to continue therapy and make progress, but "hope(s) it's not " "too much".  GALLOWAY educates on benefits of continued daily therapy and how therapy staff will use clinical judgement to grade tasks and promote success toward patient's goals  GALLOWAY demo use of reacher for LB dressing, sock aide/shoe horn for footwear management.   Patient able to use reacher and sock aide to mimic demo with Min Vcs  -     GALLOWAY also educates on and issues long-handled sponge, for future use.  -Patient's CBG is elevated and requests nurse be notified. GALLOWAY does so x2.      Therapeutic Positioning  OT interventions performed during the course of today's session in an effort to prevent and/or reduce acquired pressure injuries:   Education was provided on benefits of and recommendations for therapeutic positioning      [facility] 6 Click ADL: 18    Patient Education:  Patient provided with verbal education and demonstrations education regarding OT role/goals/POC, safety awareness, and hip kit items .  Understanding was verbalized.      Patient left up in chair with all lines intact, call button in reach, and CNA notified.    GOALS:   Multidisciplinary Problems       Occupational Therapy Goals          Problem: Occupational Therapy    Goal Priority Disciplines Outcome Interventions   Occupational Therapy Goal     OT, PT/OT Progressing    Description: Goals to be met by: in 1 month      Patient will increase functional independence with ADLs by performing:    LE Dressing with Modified [place].  Grooming while standing at sink with Modified [place].  Toileting from toilet with Modified [place] for hygiene and clothing management.   Toilet transfer to toilet with Modified [place].                         Time Tracking:     OT Date of Treatment: 05/01/25  OT Start Time: 1610  OT Stop Time: 1640  OT Total Time (min): 30 min    Billable Minutes:Self Care/Home Management 30    OT/MITCH: MITCH     Number of MITCH visits since last OT visit: 2    5/1/2025     "

## 2025-05-02 VITALS
HEIGHT: 69 IN | HEART RATE: 85 BPM | OXYGEN SATURATION: 98 % | TEMPERATURE: 98 F | DIASTOLIC BLOOD PRESSURE: 73 MMHG | BODY MASS INDEX: 23.77 KG/M2 | WEIGHT: 160.5 LBS | RESPIRATION RATE: 20 BRPM | SYSTOLIC BLOOD PRESSURE: 113 MMHG

## 2025-05-02 LAB — GLUCOSE SERPL-MCNC: 202 MG/DL (ref 70–110)

## 2025-05-02 PROCEDURE — 25000003 PHARM REV CODE 250: Performed by: NURSE PRACTITIONER

## 2025-05-02 PROCEDURE — 63600175 PHARM REV CODE 636 W HCPCS: Performed by: NURSE PRACTITIONER

## 2025-05-02 PROCEDURE — 97110 THERAPEUTIC EXERCISES: CPT | Mod: CQ

## 2025-05-02 PROCEDURE — 97116 GAIT TRAINING THERAPY: CPT | Mod: CQ

## 2025-05-02 PROCEDURE — 25000003 PHARM REV CODE 250: Performed by: SURGERY

## 2025-05-02 PROCEDURE — 94799 UNLISTED PULMONARY SVC/PX: CPT

## 2025-05-02 RX ADMIN — DOCUSATE SODIUM 50 MG: 50 CAPSULE, LIQUID FILLED ORAL at 09:05

## 2025-05-02 RX ADMIN — SACUBITRIL AND VALSARTAN 1 TABLET: 24; 26 TABLET, FILM COATED ORAL at 09:05

## 2025-05-02 RX ADMIN — PANTOPRAZOLE SODIUM 40 MG: 40 TABLET, DELAYED RELEASE ORAL at 09:05

## 2025-05-02 RX ADMIN — TRAMADOL HYDROCHLORIDE 50 MG: 50 TABLET, COATED ORAL at 09:05

## 2025-05-02 RX ADMIN — AMIODARONE HYDROCHLORIDE 200 MG: 200 TABLET ORAL at 09:05

## 2025-05-02 RX ADMIN — INSULIN ASPART 4 UNITS: 100 INJECTION, SOLUTION INTRAVENOUS; SUBCUTANEOUS at 05:05

## 2025-05-02 RX ADMIN — METOPROLOL SUCCINATE 100 MG: 50 TABLET, EXTENDED RELEASE ORAL at 09:05

## 2025-05-02 NOTE — PLAN OF CARE
Problem: Fall Injury Risk  Goal: Absence of Fall and Fall-Related Injury  Outcome: Adequate for Care Transition     Problem: Fall Injury Risk  Goal: Absence of Fall and Fall-Related Injury  Outcome: Adequate for Care Transition     Problem: Comorbidity Management  Goal: Maintenance of Heart Failure Symptom Control  Outcome: Adequate for Care Transition     Problem: Infection  Goal: Absence of Infection Signs and Symptoms  Outcome: Adequate for Care Transition     Problem: Pain Acute  Goal: Optimal Pain Control and Function  Outcome: Adequate for Care Transition     Problem: Diabetes Comorbidity  Goal: Blood Glucose Level Within Targeted Range  Outcome: Adequate for Care Transition     Problem: Adult Inpatient Plan of Care  Goal: Plan of Care Review  Outcome: Adequate for Care Transition  Goal: Patient-Specific Goal (Individualized)  Outcome: Adequate for Care Transition  Goal: Absence of Hospital-Acquired Illness or Injury  Outcome: Adequate for Care Transition  Goal: Optimal Comfort and Wellbeing  Outcome: Adequate for Care Transition  Goal: Readiness for Transition of Care  Outcome: Adequate for Care Transition     Problem: Wound  Goal: Optimal Coping  Outcome: Adequate for Care Transition  Goal: Optimal Functional Ability  Outcome: Adequate for Care Transition  Goal: Absence of Infection Signs and Symptoms  Outcome: Adequate for Care Transition  Goal: Improved Oral Intake  Outcome: Adequate for Care Transition  Goal: Optimal Pain Control and Function  Outcome: Adequate for Care Transition  Goal: Skin Health and Integrity  Outcome: Adequate for Care Transition  Goal: Optimal Wound Healing  Outcome: Adequate for Care Transition

## 2025-05-02 NOTE — PROGRESS NOTES
Patient was given discharge to rehab facility with medications and follow up appointments. He was discharged to [facility] with staff in stable condition with all questions answered.

## 2025-05-02 NOTE — PLAN OF CARE
05/02/25 1056   Final Note   Assessment Type Final Discharge Note   Anticipated Discharge Disposition SNF   What phone number can be called within the next 1-3 days to see how you are doing after discharge? [phone]   Hospital Resources/Appts/Education Provided Appointments scheduled and added to AVS   Post-Acute Status   Post-Acute Authorization Placement   Post-Acute Placement Status Set-up Complete/Auth obtained   Discharge Delays None known at this time     Pt being discharged to [facility] today via facility transport. Brother is aware. No further dc needs at this time.

## 2025-05-02 NOTE — PT/OT/SLP PROGRESS
Physical Therapy Treatment    Patient Name:  Tashi Deluna   MRN:  [id number]    Recommendations:     Discharge therapy intensity: Moderate Intensity Therapy   Discharge Equipment Recommendations: to be determined by next level of care  Barriers to discharge: Impaired mobility    Assessment:     Tashi Deluna is a 65 y.o. male admitted with a medical diagnosis of  surgical repair of abdominal ventral hernia and L inguinal hernia, now with post-op hematoma s/p evac + I&D.  He presents with the following impairments/functional limitations: weakness, impaired endurance, impaired self care skills, impaired functional mobility, gait instability, impaired balance, pain     Pt with HOB elevated and in good spirits, ready to participate in PT tx.Pt able to sit on EOB at SPV level where he was able to stand with RW at SPV before ambulating in hallway with RW SPV. Pt able to ambulate 130' before taking a standing rest break before ambulating back to room 130' all with RW at SPV level with slightly hunched posture due to incisional pain with extension. Pt in bedside chair with geomat to perform therex with cueing req for full ROM and to stay on task. Pt left up in chair with all needs in reach and in no distress awaiting CNA to help him shave.     Rehab Prognosis: Good; patient would benefit from acute skilled PT services to address these deficits and reach maximum level of function.    Recent Surgery: Procedure(s) (LRB):  Incision and Drainage (N/A)  INCISION AND DRAINAGE, ABDOMEN 4 Days Post-Op    Plan:     During this hospitalization, patient would benefit from acute PT services 5 x/week to address the identified rehab impairments via gait training, therapeutic activities, therapeutic exercises and progress toward the following goals:    Plan of Care Expires:  05/29/25    Subjective     Chief Complaint:   Patient/Family Comments/goals:   Pain/Comfort:  Pain Rating 1: 5/10  Location - Orientation 1: generalized  Location 1:  abdomen  Pain Addressed 1: Reposition, Distraction  Pain Rating Post-Intervention 1: 4/10      Objective:     Communicated with nsg prior to session.  Patient found HOB elevated with KEESHA drain upon PT entry to room.     General Precautions: Standard, fall  Orthopedic Precautions: N/A  Braces: N/A  Respiratory Status: Room air  Blood Pressure:   Skin Integrity: Visible skin intact      Functional Mobility:  Bed Mobility:     Supine to Sit: supervision  Transfers:     Sit to Stand:  supervision with rolling walker  Gait: 130' x2 with standing rest break jail with RW at SPV level     Therapeutic Activities/Exercises:  Ankle pumps 2x10  Hip abd/add 2x10  LAQ 2x10  Marches 2x10    Education:  Patient provided with verbal education education regarding PT role/goals/POC, fall prevention, safety awareness, and discharge/DME recommendations.  Understanding was verbalized.     Patient left up in chair with all lines intact, call button in reach, geomat cushion, and nsg notified    GOALS:   Multidisciplinary Problems       Physical Therapy Goals          Problem: Physical Therapy    Goal Priority Disciplines Outcome Interventions   Physical Therapy Goal     PT, PT/OT Progressing    Description: Goals to be met by: 25     Patient will increase functional independence with mobility by performin. Supine to sit with Modified [place]  2. Sit to stand transfer with Modified [place]  3. Bed to chair transfer with Modified [place] using No Assistive Device  4. Gait  x 150 feet with Modified [place] using No Assistive Device.   5. Ascend/descend 3 stair without Handrails Stand-by Assistance                         Time Tracking:     PT Received On: 25  PT Start Time: 0936     PT Stop Time: 1000  PT Total Time (min): 24 min     Billable Minutes: Gait Training 16 and Therapeutic Exercise 8    Treatment Type: Treatment  PT/PTA: PTA     Number of PTA visits since last PT visit: 3     2025

## 2025-05-02 NOTE — PLAN OF CARE
05/02/25 0819   Medicare Message   Important Message from Medicare regarding Discharge Appeal Rights Given to patient/caregiver;Explained to patient/caregiver

## 2025-05-02 NOTE — PLAN OF CARE
Problem: Comorbidity Management  Goal: Maintenance of Heart Failure Symptom Control  Outcome: Progressing     Problem: Comorbidity Management  Goal: Maintenance of Heart Failure Symptom Control  5/1/2025 1960 by Drake Blackwell LPN  Outcome: Progressing  5/1/2025 1960 by Drake Blackwell LPN  Outcome: Progressing     Problem: Fall Injury Risk  Goal: Absence of Fall and Fall-Related Injury  5/1/2025 1960 by Drake Blackwell LPN  Outcome: Progressing  5/1/2025 1958 by Drake Blackwell LPN  Outcome: Progressing     Problem: Infection  Goal: Absence of Infection Signs and Symptoms  5/1/2025 1960 by Drake Blackwell LPN  Outcome: Progressing  5/1/2025 1958 by Drake Blackwell LPN  Outcome: Progressing     Problem: Pain Acute  Goal: Optimal Pain Control and Function  5/1/2025 1958 by Drake Blackwell LPN  Outcome: Progressing  5/1/2025 1958 by Drake Blackwell LPN  Outcome: Progressing     Problem: Diabetes Comorbidity  Goal: Blood Glucose Level Within Targeted Range  5/1/2025 1960 by Drake Blackwell LPN  Outcome: Progressing  5/1/2025 1958 by Drake Blackwell LPN  Outcome: Progressing     Problem: Adult Inpatient Plan of Care  Goal: Plan of Care Review  5/1/2025 1960 by Drake Blackwell LPN  Outcome: Progressing  5/1/2025 1958 by Drake Blackwell LPN  Outcome: Progressing  Goal: Patient-Specific Goal (Individualized)  5/1/2025 1958 by Drake Blackwell LPN  Outcome: Progressing  5/1/2025 1958 by Drake Blackwell LPN  Outcome: Progressing  Goal: Absence of Hospital-Acquired Illness or Injury  5/1/2025 1958 by Drake Blackwell LPN  Outcome: Progressing  5/1/2025 1958 by Drake Blackwell LPN  Outcome: Progressing  Goal: Optimal Comfort and Wellbeing  5/1/2025 1958 by Drake Blackwell LPN  Outcome: Progressing  5/1/2025 1958 by Drake Blackwell LPN  Outcome: Progressing  Goal: Readiness for Transition of Care  5/1/2025 1958 by Drake Blackwlel LPN  Outcome: Progressing  5/1/2025 1958 by Rishi,  Latyra, LPN  Outcome: Progressing     Problem: Wound  Goal: Optimal Coping  5/1/2025 1958 by Drake Blackwell LPN  Outcome: Progressing  5/1/2025 1958 by Drake Blackwell LPN  Outcome: Progressing  Goal: Optimal Functional Ability  5/1/2025 1958 by Drake Blackwell LPN  Outcome: Progressing  5/1/2025 1958 by Drake Blackwell LPN  Outcome: Progressing  Goal: Absence of Infection Signs and Symptoms  5/1/2025 1958 by Drake Blackwell LPN  Outcome: Progressing  5/1/2025 1958 by Drake Blackwell LPN  Outcome: Progressing  Goal: Improved Oral Intake  5/1/2025 1958 by Drake Blackwell LPN  Outcome: Progressing  5/1/2025 1958 by Drake Blackwell LPN  Outcome: Progressing  Goal: Optimal Pain Control and Function  5/1/2025 1958 by Drake Blackwell LPN  Outcome: Progressing  5/1/2025 1958 by Drake Blackwell LPN  Outcome: Progressing  Goal: Skin Health and Integrity  5/1/2025 1958 by Drake Blackwell LPN  Outcome: Progressing  5/1/2025 1958 by Drake Blackwell LPN  Outcome: Progressing  Goal: Optimal Wound Healing  5/1/2025 1958 by Drake Blackwell LPN  Outcome: Progressing  5/1/2025 1958 by Drake Blackwell LPN  Outcome: Progressing

## 2025-05-04 LAB — POCT GLUCOSE: 297 MG/DL (ref 70–110)

## 2025-05-08 ENCOUNTER — OFFICE VISIT (OUTPATIENT)
Dept: SURGERY | Facility: CLINIC | Age: 65
End: 2025-05-08
Payer: MEDICARE

## 2025-05-08 VITALS
HEART RATE: 86 BPM | WEIGHT: 171 LBS | SYSTOLIC BLOOD PRESSURE: 130 MMHG | DIASTOLIC BLOOD PRESSURE: 82 MMHG | HEIGHT: 69 IN | BODY MASS INDEX: 25.33 KG/M2

## 2025-05-08 DIAGNOSIS — Z98.890 S/P EVACUATION OF HEMATOMA: ICD-10-CM

## 2025-05-08 DIAGNOSIS — Z48.89 ENCOUNTER FOR POSTOPERATIVE CARE: Primary | ICD-10-CM

## 2025-05-08 PROCEDURE — 99024 POSTOP FOLLOW-UP VISIT: CPT | Mod: POP,,, | Performed by: SURGERY

## 2025-05-08 NOTE — PROGRESS NOTES
[facility] Surgical - General Surgery Services  [address] [place] [address]  Phone: [phone]  Fax: [phone]     Post Operative Progress Note  General Surgery  Dr. Jean Tierney    Patient Name: Tashi Deluna  YOB: 1960  Author: Ana María Rogel, ANP    Date: 05/08/2025                   SUBJECTIVE:     Chief Complaint/Reason for Admission:   Routine post op appt. [facility] care.    History of Present Illness:  Mr. Tashi Deluna is a 65 y.o. male who is 2 weeks post op surgery.  The patient is currently without any complaints.    POD# 10/15  Surgical history:   Evacuation of hematoma midline abdomen ([facility]) 4/28/2025 Surgeon: Dr. Jean Tierney      OPEN INCISIONAL HERNIA REPAIR WITH COMPONENT SEPARATION WITH PLACEMENT OF 20 X 30 CM PARIETEX MESH  LAPAROSCOPY, DIAGNOSTIC   REPAIR, HERNIA, INGUINAL, LAPAROSCOPIC (Left) ([facility]) 4/23/2025 Surgeon: Dr. Jean Tierney     Hospital Course:   Mr. Tashi Deluna is a 65 y.o. male  that presented to [facility] on April 23, 2025 for an elective open incisional hernia repair placement of intra-abdominal mesh and laparoscopic left inguinal hernia repair.  Patient tolerated procedure well and was kept for observation.  He developed a significant postoperative subcutaneous hematoma to his abdominal wall midline and had a drop in his hematocrit on postop day 2 to 31 from 39.9 pre op.  Short-term recheck hematocrit drifted down to 30 on postop day 2.  2 units packed red blood cells typed and crossed and administered on April 25, 2025.  On postop day 3 he was transferred to [facility] for higher level of care.  Due to large abdominal wall hematoma Dr. Tierney recommended evacuation of hematoma to ensure no continued active bleeding was present.  Patient had evacuation of hematoma, subcutaneous washout/irrigation, and evacuation of old clot performed on April 28,  2025.  There was no active bleeding noted at the time of surgery and KEESHA drain was replaced.  Patient was then transferred to the recovery room and then to the postoperative for floor for further care and treatment.  Patient's diet was slowly advanced to diabetic regular diet.  Once patient voiding and having bowel movements and ambulating without difficulty he was prepared for discharge to rehab facility.  KEESHA drain removed today on 05/01/2025 by nursing staff.  Case management worked with insurance and has received approval for rehab stay at [facility] in [place].    Patient Care Team:  Guevara Torres MD as PCP - General (Family Medicine)  Jean Tierney MD as Surgeon (General Surgery)  Kin Hubbard MD as Consulting Physician (Cardiology)  Jus Douglass MD (Endocrinology)  Kyler Mckeon MD (Gastroenterology)  Mikaela Poole FNP as Nurse Practitioner (Family Medicine)    Review of Systems:  12 point ROS negative except as stated in HPI    PAST HISTORY:     Past Medical History:   Diagnosis Date    Allergy 1990s    Anxiety 2022    Atrial fibrillation     CHF (congestive heart failure)     Dysphagia     Fatigue     GERD (gastroesophageal reflux disease)     History of acute pancreatitis     History of anxiety     Incisional hernia of anterior abdominal wall without obstruction or gangrene     Inguinal hernia, bilateral     Neuropathy, diabetic     SOB (shortness of breath)     Type 1 diabetes mellitus     Ulcer 2010    Weakness      Past Surgical History:   Procedure Laterality Date    ABLATION N/A 07/05/2022    Procedure: ABLATION;  Surgeon: Kin Hubbard MD;  Location: [facility] CATH LAB;  Service: Cardiology;  Laterality: N/A;  AV NODE ABLATION W/ ANEST.    CARDIAC ELECTROPHYSIOLOGY STUDY AND ABLATION      CARDIOVERSION      COLONOSCOPY  2021    DIAGNOSTIC LAPAROSCOPY N/A 04/23/2025    Procedure: LAPAROSCOPY, DIAGNOSTIC;  Surgeon: Jean Tierney MD;  Location: [facility] OR;  Service:  General;  Laterality: N/A;    EGD, WITH FOREIGN BODY REMOVAL N/A 12/11/2024    Procedure: EGD, WITH FOREIGN BODY REMOVAL;  Surgeon: Samuel De La Garza MD;  Location: [facility] OR;  Service: Gastroenterology;  Laterality: N/A;    EVACUATION, HEMATOMA, INGUINAL REGION Right 07/29/2024    Procedure: EVACUATION, HEMATOMA, INGUINAL REGION  //right  / Evacuation of right groin/scrotal hematoma/seroma;  Surgeon: Jean Aguiar MD;  Location: [facility] OR;  Service: General;  Laterality: Right;  Evacuation of right groin/scrotal hematoma/seroma    Gastric Ulcer Sx  2009    Dr. aguiar    HERNIA REPAIR Right     Open (6 years old)    INCISION AND DRAINAGE N/A 04/28/2025    Procedure: Incision and Drainage;  Surgeon: Jean Aguiar MD;  Location: [facility] OR;  Service: General;  Laterality: N/A;    INCISION AND DRAINAGE OF ABDOMEN  04/28/2025    Procedure: INCISION AND DRAINAGE, ABDOMEN;  Surgeon: Jean Aguiar MD;  Location: [facility] OR;  Service: General;;    INSERTION OF PACEMAKER  04/2022    LAPAROSCOPIC GASTRIC BANDING      REPAIR, HERNIA, INCISIONAL  02/26/2024    Procedure: REPAIR, HERNIA, INCISIONAL;  Surgeon: Jean Aguiar MD;  Location: [facility] OR;  Service: General;;  Open abdominal incisional hernia repair with mesh.    REPAIR, HERNIA, INCISIONAL N/A 04/23/2025    Procedure: OPEN INCISIONAL HERNIA REPAIR WITH COMPONENT SEPARATION WITH PLACEMENT OF 20 X 30 CM PARIETEX MESH;  Surgeon: Jean Aguiar MD;  Location: [facility] OR;  Service: General;  Laterality: N/A;    REPAIR, HERNIA, INGUINAL Right 02/26/2024    Procedure: REPAIR, HERNIA, INGUINAL;  Surgeon: Jean Aguiar MD;  Location: [facility] OR;  Service: General;  Laterality: Right;  Open right inguinal hernia repair with mesh.    REPAIR, HERNIA, INGUINAL, LAPAROSCOPIC Left 04/23/2025    Procedure: REPAIR, HERNIA, INGUINAL, LAPAROSCOPIC;  Surgeon: Jean Aguiar MD;  Location: [facility] OR;  Service: General;  Laterality: Left;     Family History   Problem Relation  Name Age of Onset    ALS Mother Heather     Cancer Mother Heather         Als    Cancer Father Alex Zee      Social History     Socioeconomic History    Marital status: Single   Tobacco Use    Smoking status: Former     Current packs/day: 0.00     Types: Cigarettes     Quit date:      Years since quittin.3    Smokeless tobacco: Never   Substance and Sexual Activity    Alcohol use: Not Currently    Drug use: Never     Social Drivers of Health     Financial Resource Strain: Patient Declined (2025)    Overall Financial Resource Strain (CARDIA)     Difficulty of Paying Living Expenses: Patient declined   Food Insecurity: Patient Declined (2025)    Hunger Vital Sign     Worried About Running Out of Food in the Last Year: Patient declined     Ran Out of Food in the Last Year: Patient declined   Transportation Needs: Patient Declined (2025)    PRAPARE - Transportation     Lack of Transportation (Medical): Patient declined     Lack of Transportation (Non-Medical): Patient declined   Physical Activity: Unknown (2024)    Exercise Vital Sign     Days of Exercise per Week: 0 days   Stress: Patient Declined (2025)    [employer] - Occupational Stress Questionnaire     Feeling of Stress : Patient declined   Housing Stability: Patient Declined (2025)    Housing Stability Vital Sign     Unable to Pay for Housing in the Last Year: Patient declined     Homeless in the Last Year: Patient declined       MEDICATIONS & ALLERGIES:     Current Outpatient Medications on File Prior to Visit   Medication Sig    amiodarone (PACERONE) 400 MG tablet Take 0.5 tablets (200 mg total) by mouth 2 (two) times daily. Amiodarone 200mg tablets 400mg twice daily x 3 days then 200mg twice daily x 3 days then 200mg daily    cyanocobalamin, vitamin B-12, (VITAMIN B-12 ORAL) Take by mouth.    diphenhydrAMINE (BENADRYL ALLERGY) 25 mg tablet Take 25 mg by mouth daily as needed for Allergies.  "   ELIQUIS 5 mg Tab Take 5 mg by mouth 2 (two) times daily.    ENTRESTO 24-26 mg per tablet Take 1 tablet by mouth 2 (two) times daily.    ergocalciferol, vitamin D2, (VITAMIN D ORAL) Take by mouth.    famotidine (PEPCID) 10 MG tablet Take 10 mg by mouth 2 (two) times daily.    HYDROcodone-acetaminophen (NORCO) 7.5-325 mg per tablet Take 1 tablet by mouth every 6 (six) hours as needed for Pain.    insulin regular 100 unit/mL Inj injection Inject into the skin 3 (three) times daily before meals. Per sliding scale    LANTUS U-100 INSULIN 100 unit/mL injection Inject 10 Units into the skin Daily. Per sliding scale    magnesium oxide 500 mg Tab Take 1 tablet by mouth once daily.    metoprolol succinate (TOPROL-XL) 100 MG 24 hr tablet Take 100 mg by mouth once daily.    pantoprazole (PROTONIX) 40 MG tablet Take 40 mg by mouth once daily.    rosuvastatin (CRESTOR) 20 MG tablet Take 20 mg by mouth every evening.    vitamin D (VITAMIN D3) 1000 units Tab Take 1,000 Units by mouth once daily.     Current Facility-Administered Medications on File Prior to Visit   Medication    lactated ringers infusion     Review of patient's allergies indicates:   Allergen Reactions    Amoxicillin Rash       OBJECTIVE:   Visit Vitals  /82   Pulse 86   Ht 5' 9" (1.753 m)   Wt 77.6 kg (171 lb)   BMI 25.25 kg/m²       Physical Exam:  General:  Well developed, well nourished, no acute distress  GI:  Abdomen soft, non-tender, non-distended, normoactive bowel sounds, no masses  Skin:  Incision Clean/Dry/Intact. No evidence of infection.      Wound with seroma and hematoma    VISIT DIAGNOSES:       ICD-10-CM ICD-9-CM   1. Encounter for postoperative care  Z48.89 V58.49   2. S/P evacuation of hematoma  Z98.890 V45.89       ASSESSMENT/PLAN:     65 y.o. male 2 weeks post op s/p open incisional hernia repair, lap LIH repair, evacuation of post op hematoma.     Sterile evacuation of seroma performed    FU 2 weeks    Arnica to abdominal " hematoma    Continue Rehab    -  Patient is progressing well.  -  Wounds healing well without evidence of infection:  -  Dr. Tierney examined patient, discussed recommendations, and answered all questions regarding post op course.     MARSHA Ramos    I, MARSHA Bowen, am scribing for, and in the presence of, Jean Tierney MD, performed the above scribed service and the documentation accurately describes the services I performed. I attest to the accuracy of the note.

## 2025-05-12 NOTE — PHYSICIAN QUERY
Please clarify if there is any clinical correlation between  Postoperative hematoma and Extrinsic circulating anticoagulants.   Are the conditions:  Due to or associated with each other

## 2025-05-20 ENCOUNTER — OFFICE VISIT (OUTPATIENT)
Dept: SURGERY | Facility: CLINIC | Age: 65
End: 2025-05-20
Payer: MEDICARE

## 2025-05-20 VITALS
BODY MASS INDEX: 25.33 KG/M2 | HEART RATE: 95 BPM | HEIGHT: 69 IN | SYSTOLIC BLOOD PRESSURE: 96 MMHG | DIASTOLIC BLOOD PRESSURE: 65 MMHG | WEIGHT: 171 LBS

## 2025-05-20 DIAGNOSIS — Z48.89 ENCOUNTER FOR POSTOPERATIVE CARE: Primary | ICD-10-CM

## 2025-05-20 PROCEDURE — 99024 POSTOP FOLLOW-UP VISIT: CPT | Mod: POP,,, | Performed by: SURGERY

## 2025-05-20 NOTE — PROGRESS NOTES
[facility] Surgical - General Surgery Services  [address] [place] [id number]  Phone: [phone]  Fax: [phone]     Post Operative Progress Note  General Surgery  Dr. Jean Tierney    Patient Name: Tashi Deluna  YOB: 1960  Author: Ana María Rogel, MARSHA    Date: 05/20/2025                   SUBJECTIVE:     Chief Complaint/Reason for Admission:   Chief Complaint   Patient presents with    Post-op Evaluation     4/23/25 Open Incisional, Diagnostic Lap // 4/28/25 I & D          History of Present Illness:  Mr. Tashi Deluna is a 65 y.o. male who is 3 weeks post op surgery.  The patient is currently without any complaints.    Surgical history:   Evacuation of hematoma midline abdomen ([facility]) 4/28/2025 Surgeon: Dr. Jean Tierney      OPEN INCISIONAL HERNIA REPAIR WITH COMPONENT SEPARATION WITH PLACEMENT OF 20 X 30 CM PARIETEX MESH  LAPAROSCOPY, DIAGNOSTIC   REPAIR, HERNIA, INGUINAL, LAPAROSCOPIC (Left) ([facility]) 4/23/2025 Surgeon: Dr. Jean Tierney       Patient Care Team:  Guevara Torres MD as PCP - General (Family Medicine)  Jean Tierney MD as Surgeon (General Surgery)  Kin Hubbard MD as Consulting Physician (Cardiology)  Jus Douglass MD (Endocrinology)  Kyler Mckeon MD (Gastroenterology)  Mikaela Poole FNP as Nurse Practitioner (Family Medicine)      Review of Systems:  12 point ROS negative except as stated in HPI    PAST HISTORY:     Past Medical History:   Diagnosis Date    Allergy 1990s    Anxiety 2022    Atrial fibrillation     CHF (congestive heart failure)     Dysphagia     Fatigue     GERD (gastroesophageal reflux disease)     History of acute pancreatitis     History of anxiety     Incisional hernia of anterior abdominal wall without obstruction or gangrene     Inguinal hernia, bilateral     Neuropathy, diabetic     SOB (shortness of breath)     Type 1 diabetes mellitus     Ulcer 2010    Weakness      Past Surgical  History:   Procedure Laterality Date    ABLATION N/A 07/05/2022    Procedure: ABLATION;  Surgeon: Kin Hubbard MD;  Location: [facility] CATH LAB;  Service: Cardiology;  Laterality: N/A;  AV NODE ABLATION W/ ANEST.    CARDIAC ELECTROPHYSIOLOGY STUDY AND ABLATION      CARDIOVERSION      COLONOSCOPY  2021    DIAGNOSTIC LAPAROSCOPY N/A 04/23/2025    Procedure: LAPAROSCOPY, DIAGNOSTIC;  Surgeon: Jean Aguiar MD;  Location: [facility] OR;  Service: General;  Laterality: N/A;    EGD, WITH FOREIGN BODY REMOVAL N/A 12/11/2024    Procedure: EGD, WITH FOREIGN BODY REMOVAL;  Surgeon: Samuel De La Garza MD;  Location: [facility] OR;  Service: Gastroenterology;  Laterality: N/A;    EVACUATION, HEMATOMA, INGUINAL REGION Right 07/29/2024    Procedure: EVACUATION, HEMATOMA, INGUINAL REGION  //right  / Evacuation of right groin/scrotal hematoma/seroma;  Surgeon: Jean Aguiar MD;  Location: [facility] OR;  Service: General;  Laterality: Right;  Evacuation of right groin/scrotal hematoma/seroma    Gastric Ulcer Sx  2009    Dr. aguiar    HERNIA REPAIR Right     Open (6 years old)    INCISION AND DRAINAGE N/A 04/28/2025    Procedure: Incision and Drainage;  Surgeon: Jean Aguiar MD;  Location: [facility] OR;  Service: General;  Laterality: N/A;    INCISION AND DRAINAGE OF ABDOMEN  04/28/2025    Procedure: INCISION AND DRAINAGE, ABDOMEN;  Surgeon: Jean Aguiar MD;  Location: [facility] OR;  Service: General;;    INSERTION OF PACEMAKER  04/2022    LAPAROSCOPIC GASTRIC BANDING      REPAIR, HERNIA, INCISIONAL  02/26/2024    Procedure: REPAIR, HERNIA, INCISIONAL;  Surgeon: Jean Aguiar MD;  Location: [facility] OR;  Service: General;;  Open abdominal incisional hernia repair with mesh.    REPAIR, HERNIA, INCISIONAL N/A 04/23/2025    Procedure: OPEN INCISIONAL HERNIA REPAIR WITH COMPONENT SEPARATION WITH PLACEMENT OF 20 X 30 CM PARIETEX MESH;  Surgeon: Jean Aguiar MD;  Location: [facility] OR;  Service: General;  Laterality: N/A;    REPAIR, HERNIA,  INGUINAL Right 2024    Procedure: REPAIR, HERNIA, INGUINAL;  Surgeon: Jean Tierney MD;  Location: [facility] OR;  Service: General;  Laterality: Right;  Open right inguinal hernia repair with mesh.    REPAIR, HERNIA, INGUINAL, LAPAROSCOPIC Left 2025    Procedure: REPAIR, HERNIA, INGUINAL, LAPAROSCOPIC;  Surgeon: Jean Tierney MD;  Location: [facility] OR;  Service: General;  Laterality: Left;     Family History   Problem Relation Name Age of Onset    ALS Mother Heather     Cancer Mother Heather         Als    Cancer Father Alex Zee      Social History     Socioeconomic History    Marital status: Single   Tobacco Use    Smoking status: Former     Current packs/day: 0.00     Types: Cigarettes     Quit date:      Years since quittin.4    Smokeless tobacco: Never   Substance and Sexual Activity    Alcohol use: Not Currently    Drug use: Never     Social Drivers of Health     Financial Resource Strain: Patient Declined (2025)    Overall Financial Resource Strain (CARDIA)     Difficulty of Paying Living Expenses: Patient declined   Food Insecurity: Patient Declined (2025)    Hunger Vital Sign     Worried About Running Out of Food in the Last Year: Patient declined     Ran Out of Food in the Last Year: Patient declined   Transportation Needs: Patient Declined (2025)    PRAPARE - Transportation     Lack of Transportation (Medical): Patient declined     Lack of Transportation (Non-Medical): Patient declined   Physical Activity: Unknown (2024)    Exercise Vital Sign     Days of Exercise per Week: 0 days   Stress: Patient Declined (2025)    [employer] - Occupational Stress Questionnaire     Feeling of Stress : Patient declined   Housing Stability: Patient Declined (2025)    Housing Stability Vital Sign     Unable to Pay for Housing in the Last Year: Patient declined     Homeless in the Last Year: Patient declined       MEDICATIONS & ALLERGIES:  "    Current Outpatient Medications on File Prior to Visit   Medication Sig    cyanocobalamin, vitamin B-12, (VITAMIN B-12 ORAL) Take by mouth.    diphenhydrAMINE (BENADRYL ALLERGY) 25 mg tablet Take 25 mg by mouth daily as needed for Allergies.    ELIQUIS 5 mg Tab Take 5 mg by mouth 2 (two) times daily.    ENTRESTO 24-26 mg per tablet Take 1 tablet by mouth 2 (two) times daily.    ergocalciferol, vitamin D2, (VITAMIN D ORAL) Take by mouth.    famotidine (PEPCID) 10 MG tablet Take 10 mg by mouth 2 (two) times daily.    HYDROcodone-acetaminophen (NORCO) 7.5-325 mg per tablet Take 1 tablet by mouth every 6 (six) hours as needed for Pain.    insulin regular 100 unit/mL Inj injection Inject into the skin 3 (three) times daily before meals. Per sliding scale    LANTUS U-100 INSULIN 100 unit/mL injection Inject 10 Units into the skin Daily. Per sliding scale    metoprolol succinate (TOPROL-XL) 100 MG 24 hr tablet Take 100 mg by mouth once daily.    rosuvastatin (CRESTOR) 20 MG tablet Take 20 mg by mouth every evening.    vitamin D (VITAMIN D3) 1000 units Tab Take 1,000 Units by mouth once daily.    amiodarone (PACERONE) 400 MG tablet Take 0.5 tablets (200 mg total) by mouth 2 (two) times daily. Amiodarone 200mg tablets 400mg twice daily x 3 days then 200mg twice daily x 3 days then 200mg daily (Patient not taking: Reported on 5/20/2025)    magnesium oxide 500 mg Tab Take 1 tablet by mouth once daily. (Patient not taking: Reported on 5/20/2025)    pantoprazole (PROTONIX) 40 MG tablet Take 40 mg by mouth once daily. (Patient not taking: Reported on 5/20/2025)     Current Facility-Administered Medications on File Prior to Visit   Medication    lactated ringers infusion     Review of patient's allergies indicates:   Allergen Reactions    Amoxicillin Rash       OBJECTIVE:   Visit Vitals  BP 96/65   Pulse 95   Ht 5' 9" (1.753 m)   Wt 77.6 kg (171 lb)   BMI 25.25 kg/m²       Physical Exam:  General:  Well developed, well " nourished, no acute distress  GI:  Abdomen soft, non-tender, non-distended, normoactive bowel sounds, no masses, No evidence of ventral hernia recurrence.  Skin:  Lap sites Clean/Dry/Intact. No evidence of infection.Midline incision healing well.   Hematoma resolving. Staples intact to midline.     VISIT DIAGNOSES:       ICD-10-CM ICD-9-CM   1. Encounter for postoperative care  Z48.89 V58.49       ASSESSMENT/PLAN:     65 y.o. male 3 weeks open incisional hernia repair and lap LIH repair.     -  Every other staple removed, steri strips applied.   -  Patient is progressing well.  -  Wounds healing well without evidence of infection.   -  No lifting or straining over 20# for additional 4 weeks.   -  Dr. Tierney examined patient, discussed recommendations, and answered all questions regarding post op course.     RTC 1 week      MARSHA Ramos    I, MARSHA Bowen, am scribing for, and in the presence of, Jean Tierney MD, performed the above scribed service and the documentation accurately describes the services I performed. I attest to the accuracy of the note.

## 2025-05-27 ENCOUNTER — OFFICE VISIT (OUTPATIENT)
Dept: SURGERY | Facility: CLINIC | Age: 65
End: 2025-05-27
Payer: MEDICARE

## 2025-05-27 DIAGNOSIS — Z48.89 POSTOPERATIVE VISIT: Primary | ICD-10-CM

## 2025-05-27 PROCEDURE — 99024 POSTOP FOLLOW-UP VISIT: CPT | Mod: POP,,, | Performed by: SURGERY

## 2025-06-24 ENCOUNTER — OFFICE VISIT (OUTPATIENT)
Dept: SURGERY | Facility: CLINIC | Age: 65
End: 2025-06-24
Payer: MEDICARE

## 2025-06-24 VITALS
HEIGHT: 69 IN | HEART RATE: 87 BPM | BODY MASS INDEX: 23.55 KG/M2 | SYSTOLIC BLOOD PRESSURE: 129 MMHG | WEIGHT: 159 LBS | DIASTOLIC BLOOD PRESSURE: 85 MMHG

## 2025-06-24 DIAGNOSIS — Z48.89 POSTOPERATIVE VISIT: Primary | ICD-10-CM

## 2025-06-24 NOTE — PROGRESS NOTES
Patient ID: [id number]   Chief Complaint: Post-op Evaluation (4/23/25 Open Incisional, Diagnostic Lap // 4/28/25 I & D, removed staples 5.27.25 FU/)    HPI:     Tashi Deluna is a 65 y.o. male here today for postoperative visit of Exploratory Laparotomy, Excision of skin, subcutaneous tissue, scar, enterolysis, component separation, incisional hernia repair with mesh implant, L Laparoscopic Inguinal Hernia Repair  4/28/2025. Pt's incisions are healing well, denies any abd pain. having normal bowel movements, passing flatus, no NVD. no constipation.     Pathology: 1. SOFT TISSUE, INCISIONAL HERNIA, EXCISION:   FIBROVASCULAR AND FIBROADIPOSE TISSUE, CONSISTENT WITH UMBILICAL HERNIA.      2. SKIN, ABDOMINAL, EXCISION:   ABSCESS.   FOREIGN BODY GIANT CELL REACTION AND SCAR, CONSISTENT WITH PRIOR SURGERY.     Patient Care Team:  Guevara Torres MD as PCP - General (Family Medicine)  Jean Tierney MD as Surgeon (General Surgery)  Kin Hubbard MD as Consulting Physician (Cardiology)  Jus Douglass MD (Endocrinology)  Kyler Mckeon MD (Gastroenterology)  Mikaela Poole FNP as Nurse Practitioner (Family Medicine)   Past Medical History:   Diagnosis Date    Allergy 1990s    Anxiety 2022    Atrial fibrillation     CHF (congestive heart failure)     Dysphagia     Fatigue     GERD (gastroesophageal reflux disease)     History of acute pancreatitis     History of anxiety     Incisional hernia of anterior abdominal wall without obstruction or gangrene     Inguinal hernia, bilateral     Neuropathy, diabetic     SOB (shortness of breath)     Type 1 diabetes mellitus     Ulcer 2010    Weakness       Past Surgical History:   Procedure Laterality Date    ABLATION N/A 07/05/2022    Procedure: ABLATION;  Surgeon: Kin Hubbard MD;  Location: [facility] CATH LAB;  Service: Cardiology;  Laterality: N/A;  AV NODE ABLATION W/ ANEST.    CARDIAC ELECTROPHYSIOLOGY STUDY AND ABLATION      CARDIOVERSION      COLONOSCOPY  2021     DIAGNOSTIC LAPAROSCOPY N/A 04/23/2025    Procedure: LAPAROSCOPY, DIAGNOSTIC;  Surgeon: Jean Aguiar MD;  Location: [facility] OR;  Service: General;  Laterality: N/A;    EGD, WITH FOREIGN BODY REMOVAL N/A 12/11/2024    Procedure: EGD, WITH FOREIGN BODY REMOVAL;  Surgeon: Samuel De La Garza MD;  Location: [facility] OR;  Service: Gastroenterology;  Laterality: N/A;    EVACUATION, HEMATOMA, INGUINAL REGION Right 07/29/2024    Procedure: EVACUATION, HEMATOMA, INGUINAL REGION  //right  / Evacuation of right groin/scrotal hematoma/seroma;  Surgeon: Jean Aguiar MD;  Location: [facility] OR;  Service: General;  Laterality: Right;  Evacuation of right groin/scrotal hematoma/seroma    Gastric Ulcer Sx  2009    Dr. aguiar    HERNIA REPAIR Right     Open (6 years old)    INCISION AND DRAINAGE N/A 04/28/2025    Procedure: Incision and Drainage;  Surgeon: Jean Aguiar MD;  Location: [facility] OR;  Service: General;  Laterality: N/A;    INCISION AND DRAINAGE OF ABDOMEN  04/28/2025    Procedure: INCISION AND DRAINAGE, ABDOMEN;  Surgeon: Jean Aguiar MD;  Location: [facility] OR;  Service: General;;    INSERTION OF PACEMAKER  04/2022    LAPAROSCOPIC GASTRIC BANDING      REPAIR, HERNIA, INCISIONAL  02/26/2024    Procedure: REPAIR, HERNIA, INCISIONAL;  Surgeon: Jean Aguiar MD;  Location: [facility] OR;  Service: General;;  Open abdominal incisional hernia repair with mesh.    REPAIR, HERNIA, INCISIONAL N/A 04/23/2025    Procedure: OPEN INCISIONAL HERNIA REPAIR WITH COMPONENT SEPARATION WITH PLACEMENT OF 20 X 30 CM PARIETEX MESH;  Surgeon: Jean Aguiar MD;  Location: [facility] OR;  Service: General;  Laterality: N/A;    REPAIR, HERNIA, INGUINAL Right 02/26/2024    Procedure: REPAIR, HERNIA, INGUINAL;  Surgeon: Jean Aguiar MD;  Location: [facility] OR;  Service: General;  Laterality: Right;  Open right inguinal hernia repair with mesh.    REPAIR, HERNIA, INGUINAL, LAPAROSCOPIC Left 04/23/2025    Procedure: REPAIR, HERNIA, INGUINAL,  "LAPAROSCOPIC;  Surgeon: Jean Tierney MD;  Location: [facility];  Service: General;  Laterality: Left;      Social History     Tobacco Use    Smoking status: Former     Current packs/day: 0.00     Types: Cigarettes     Quit date:      Years since quittin.5    Smokeless tobacco: Never   Substance and Sexual Activity    Alcohol use: Not Currently    Drug use: Never    Sexual activity: Not on file      Current Outpatient Medications   Medication Instructions    amiodarone (PACERONE) 200 mg, Oral, 2 times daily, Amiodarone 200mg tablets 400mg twice daily x 3 days then 200mg twice daily x 3 days then 200mg daily    cyanocobalamin, vitamin B-12, (VITAMIN B-12 ORAL) Take by mouth.    diphenhydrAMINE (BENADRYL ALLERGY) 25 mg, Daily PRN    ELIQUIS 5 mg, 2 times daily    ENTRESTO 24-26 mg per tablet 1 tablet, 2 times daily    ergocalciferol, vitamin D2, (VITAMIN D ORAL) Take by mouth.    famotidine (PEPCID) 10 mg, 2 times daily    HYDROcodone-acetaminophen (NORCO) 7.5-325 mg per tablet 1 tablet, Oral, Every 6 hours PRN    insulin regular 100 unit/mL Inj injection 3 times daily before meals    LANTUS U-100 INSULIN 10 Units, Daily    magnesium oxide 500 mg Tab 1 tablet, Daily    metoprolol succinate (TOPROL-XL) 100 mg, Daily    pantoprazole (PROTONIX) 40 mg, Daily    rosuvastatin (CRESTOR) 20 mg, Nightly    vitamin D (VITAMIN D3) 1,000 Units, Daily     Review of patient's allergies indicates:   Allergen Reactions    Amoxicillin Rash        Subjective:     Review of Systems    12 point review of systems conducted, negative except as stated in the history of present illness. See HPI for details.    Objective:     Visit Vitals  /85   Pulse 87   Ht 5' 9" (1.753 m)   Wt 72.1 kg (159 lb)   BMI 23.48 kg/m²       Physical Exam:  General:  Alert and oriented.    Gastrointestinal:  Soft, Non-tender, Non-distended, Normal bowel sounds. Lap sites healing well, no redness, swelling, drainage, or foul odor. No hernia noted. "   Musculoskeletal:  Normal range of motion, Normal strength.    Integumentary:  Warm, Dry, Pink.    Neurologic:  Alert, Oriented.    Psychiatric:  Cooperative.      Assessment:       ICD-10-CM ICD-9-CM   1. Postoperative visit  Z48.89 V58.49        Plan:     1. Postoperative visit         - no need for further follow up at this time, incision is very well healed, pt using a walker now and eating very well.   - Incisions healing well   - ok to take adhesive off with adhesive remover   - ok to get in tub, swim, wash with any soap, and still no lifting >15# x's 2 weeks.  - ED precautions given to patient and is to call the office immediately or go to the emergency room if he notices any redness, swelling, severe pain, increase nausea/vomiting, fever, irregular bowel movements or severe diarrhea    No future appointments.     Sharlene Gregg, MIO

## (undated) DEVICE — CHLORAPREP W TINT 26ML APPL

## (undated) DEVICE — DEVICE FIXATION ABSTACK 30

## (undated) DEVICE — APPLICATOR CHLORAPREP ORN 26ML

## (undated) DEVICE — DRAIN WOUND 19FR TROCAR

## (undated) DEVICE — NDL 27G X 1 1/4

## (undated) DEVICE — CATH RED RUBBER LATEX 14F 16IN

## (undated) DEVICE — BINDER ABDOM 4PANEL 12IN SM/MD

## (undated) DEVICE — CONTAINER SPECIMEN SCREW 4OZ

## (undated) DEVICE — Device

## (undated) DEVICE — TRAY SKIN SCRUB WET PREMIUM

## (undated) DEVICE — CATH FLEXABILITY F-J 8FR 115CM

## (undated) DEVICE — GLOVE SURG BIOGEL LATEX SZ 7.5

## (undated) DEVICE — SUT VICRYL 3-0 27 SH

## (undated) DEVICE — SPONGE LAP STRL 18X18IN

## (undated) DEVICE — PAD PREP CUFFED NS 24X48IN

## (undated) DEVICE — SUT PROLENE 0 CR/CT-1 8-18

## (undated) DEVICE — SUT 2-0 ETHILON 18 FS

## (undated) DEVICE — SUT ETHIBOND EXCEL 1 CTX 18

## (undated) DEVICE — SHEATH BRITE TIP INTRO 11CM 9F

## (undated) DEVICE — SUT VICRYL 1 OB 36 CTX

## (undated) DEVICE — CANNULA ENDOPATH XCEL 5X100MM

## (undated) DEVICE — SOL IRRI STRL WATER 1000ML

## (undated) DEVICE — SPONGE LAP 18X18 PREWASHED

## (undated) DEVICE — TRAY CATH 1-LYR URIMTR 16FR

## (undated) DEVICE — SUT SILK 3-0 STRANDS 30IN

## (undated) DEVICE — TROCAR ENDOPATH XCEL 5X100MM

## (undated) DEVICE — TRAY CATH 1-LYR FOLEY SIL 16FR

## (undated) DEVICE — KIT GEN LAPAROSCOPY LAFAYETTE

## (undated) DEVICE — ELECTRODE PATIENT RETURN DISP

## (undated) DEVICE — GLOVE PROTEXIS HYDROGEL SZ7.5

## (undated) DEVICE — SUT ETHILON 2-0 FS 18IN BLK

## (undated) DEVICE — GAUZE DRAIN N WVN 6PLY 4X4IN

## (undated) DEVICE — SOL NACL IRR 1000ML BTL

## (undated) DEVICE — SUT VICRYL PLUS 3-0 SH 18IN

## (undated) DEVICE — SUT 2-0 12-18IN SILK

## (undated) DEVICE — SUPPORT ULNA NERVE PROTECTOR

## (undated) DEVICE — SNARE CAPTIFLEX 2.4X27MM 240CM

## (undated) DEVICE — NDL SAFETY 21G X 1 1/2 ECLPSE

## (undated) DEVICE — SUT VICRYL CTD 2-0 GI 27 SH

## (undated) DEVICE — TROCAR STRUC BLN INFL BLB 10MM

## (undated) DEVICE — IRRIGATOR HYDRO-SURG PLUS 5MM

## (undated) DEVICE — KIT SURGICAL TURNOVER

## (undated) DEVICE — GLOVE SENSICARE PI SURG 6

## (undated) DEVICE — GLOVE SENSICARE PI MICRO 7.5

## (undated) DEVICE — ARROW SHEATH SAF 7FR X 11CM

## (undated) DEVICE — GLOVE PROTEXIS LTX MICRO  7.5

## (undated) DEVICE — SUT CTD VICRYL 2.0

## (undated) DEVICE — DRAIN CHANNEL ROUND TRO 15FR

## (undated) DEVICE — STRIP MEDI WND CLSR 1/2X4IN

## (undated) DEVICE — TRAY CATH FOL SIL URIMTR 16FR

## (undated) DEVICE — TROCAR ENDOPATH XCEL 11MM 10CM

## (undated) DEVICE — BANDAGE ROLL COTTN 4.5INX4.1YD

## (undated) DEVICE — DRAPE INCISE IOBAN 2 13X13IN

## (undated) DEVICE — COUNT NDL FOAM MAGNET 40COUNT

## (undated) DEVICE — ADHESIVE MASTISOL VIAL 48/BX

## (undated) DEVICE — NDL HYPO REG 25G X 1 1/2

## (undated) DEVICE — MARKER FN REG DUAL UTIL RULER

## (undated) DEVICE — RESERVOIR JACKSON-PRATT 100CC

## (undated) DEVICE — SET TUBING COOL POINT IRR

## (undated) DEVICE — SPONGE COTTON TRAY 4X4IN

## (undated) DEVICE — SUT VICRYL+ 27 UR-6 VIOL

## (undated) DEVICE — NDL SYR 10ML 18X1.5 LL BLUNT

## (undated) DEVICE — SEALANT VISTASEAL FIBRIN 10ML

## (undated) DEVICE — STAPLER SKIN PROXIMATE WIDE

## (undated) DEVICE — TRAY CATH URET RED RUBBER 15FR

## (undated) DEVICE — DRAIN JACKSON PRATT 10MM

## (undated) DEVICE — NDL HYPO 22GX1 1/2 SYR 10ML LL

## (undated) DEVICE — DRAIN PENROSE SIL .25X12IN

## (undated) DEVICE — CAUTERY TIP POLISHER

## (undated) DEVICE — CABLE EPS EXTENSION

## (undated) DEVICE — PENCIL ELECSURG ROCKER 15FT

## (undated) DEVICE — BOWL STERILE LARGE 32OZ

## (undated) DEVICE — SUT VICRYL PLUS 4-0 FS-2 27IN

## (undated) DEVICE — CLOSURE SKIN STERI STRIP 1/2X4

## (undated) DEVICE — SUT ETHIBOND 0 CR/MO-7 8-18

## (undated) DEVICE — GOWN POLY REINF BRTH SLV XL

## (undated) DEVICE — SYR IRRIGATION BULB STER 60ML

## (undated) DEVICE — TOWEL OR DISP STRL BLUE 4/PK

## (undated) DEVICE — INTRO 8.5FR 63CM SRO

## (undated) DEVICE — TAPE MEDIPORE 4IN X 2YDS

## (undated) DEVICE — COVER TABLE HVY DTY 60X90IN

## (undated) DEVICE — BLADE SURG STAINLESS STEEL #10

## (undated) DEVICE — NDL ECLIPSE SAFETY 23G 1.5IN

## (undated) DEVICE — SUPPORTER ATHLETIC LARGE

## (undated) DEVICE — SUSPENSORY MED

## (undated) DEVICE — DRAPE FLUID WARMER 44X44IN

## (undated) DEVICE — PAD ABDOMINAL STERILE 8X10IN

## (undated) DEVICE — DRESSING ANTIMIC ISLAND 4X10IN

## (undated) DEVICE — BLLN SPACEMAKER

## (undated) DEVICE — GLOVE SENSICARE PI MICRO 6

## (undated) DEVICE — GLOVE SIGNATURE MICRO LTX 6

## (undated) DEVICE — SUT 2-0 CT-1 SPIRAL 12IN

## (undated) DEVICE — APPLICATOR VISTASEAL RIG 35CM

## (undated) DEVICE — SUT SILK 2-0 SH 18IN BLACK

## (undated) DEVICE — SUT SILK 2-0 STRANDS 30IN

## (undated) DEVICE — SUT CTD VICRYL 3-0 CR/SH

## (undated) DEVICE — CATH SUPREME QPLR CRD-2 6F 120

## (undated) DEVICE — DRAPE SLUSH WARMER 66X44IN